# Patient Record
Sex: MALE | Race: WHITE | NOT HISPANIC OR LATINO | Employment: OTHER | URBAN - METROPOLITAN AREA
[De-identification: names, ages, dates, MRNs, and addresses within clinical notes are randomized per-mention and may not be internally consistent; named-entity substitution may affect disease eponyms.]

---

## 2017-12-04 PROBLEM — Z86.0100 HISTORY OF COLON POLYPS: Status: ACTIVE | Noted: 2017-12-04

## 2017-12-04 PROBLEM — Z86.010 HISTORY OF COLON POLYPS: Status: ACTIVE | Noted: 2017-12-04

## 2019-03-06 PROBLEM — C61 PROSTATE CANCER: Status: ACTIVE | Noted: 2019-03-06

## 2019-12-02 PROBLEM — R39.12 WEAK URINARY STREAM: Status: ACTIVE | Noted: 2019-12-02

## 2019-12-02 PROBLEM — R35.0 URINARY FREQUENCY: Status: ACTIVE | Noted: 2019-12-02

## 2019-12-02 PROBLEM — R31.29 MICROSCOPIC HEMATURIA: Status: ACTIVE | Noted: 2019-12-02

## 2019-12-02 PROBLEM — R39.15 URGENCY OF URINATION: Status: ACTIVE | Noted: 2019-12-02

## 2019-12-02 PROBLEM — R33.9 INCOMPLETE EMPTYING OF BLADDER: Status: ACTIVE | Noted: 2019-12-02

## 2020-01-24 PROBLEM — R19.5 HEME POSITIVE STOOL: Status: ACTIVE | Noted: 2020-01-24

## 2020-01-28 ENCOUNTER — TELEPHONE (OUTPATIENT)
Dept: ENDOSCOPY | Facility: HOSPITAL | Age: 65
End: 2020-01-28

## 2020-01-28 DIAGNOSIS — K62.7 RADIATION PROCTITIS: Primary | ICD-10-CM

## 2020-01-28 NOTE — TELEPHONE ENCOUNTER
Please review records in [facility] Being referred by Dr Hair for APC of radiation proctitis

## 2020-01-29 NOTE — TELEPHONE ENCOUNTER
MD Irena Baker MA   Caller: Unspecified (Yesterday,  4:53 PM)             Need sigmoidoscopy with RFA for radiation proctitis.   Bridger Roy MD      Please sign order

## 2020-01-31 ENCOUNTER — TELEPHONE (OUTPATIENT)
Dept: ENDOSCOPY | Facility: HOSPITAL | Age: 65
End: 2020-01-31

## 2020-01-31 NOTE — TELEPHONE ENCOUNTER
Spoke with patient. EGD scheduled for 2/14 at 11:30a. Reviewed prep instructions. Mr Junior verbalized understanding.

## 2020-02-03 ENCOUNTER — TELEPHONE (OUTPATIENT)
Dept: ENDOSCOPY | Facility: HOSPITAL | Age: 65
End: 2020-02-03

## 2020-02-03 NOTE — TELEPHONE ENCOUNTER
Received request to schedule patient for Flex sig on 2/14/20 at 11:30am. Spoke with Patient. Reviewed medical history and medications. Instructed on procedure and prep. Patient verbalized understanding of instructions. Mailed copy of instructions to address in chart.

## 2020-02-14 ENCOUNTER — ANESTHESIA EVENT (OUTPATIENT)
Dept: ENDOSCOPY | Facility: HOSPITAL | Age: 65
End: 2020-02-14
Payer: COMMERCIAL

## 2020-02-14 ENCOUNTER — ANESTHESIA (OUTPATIENT)
Dept: ENDOSCOPY | Facility: HOSPITAL | Age: 65
End: 2020-02-14
Payer: COMMERCIAL

## 2020-02-14 ENCOUNTER — HOSPITAL ENCOUNTER (OUTPATIENT)
Facility: HOSPITAL | Age: 65
Discharge: HOME | End: 2020-02-14
Attending: INTERNAL MEDICINE | Admitting: INTERNAL MEDICINE
Payer: COMMERCIAL

## 2020-02-14 VITALS
OXYGEN SATURATION: 100 % | DIASTOLIC BLOOD PRESSURE: 82 MMHG | WEIGHT: 180 LBS | SYSTOLIC BLOOD PRESSURE: 141 MMHG | RESPIRATION RATE: 15 BRPM | HEART RATE: 76 BPM | BODY MASS INDEX: 28.93 KG/M2 | TEMPERATURE: 98 F | HEIGHT: 66 IN

## 2020-02-14 DIAGNOSIS — K62.7 RADIATION PROCTITIS: ICD-10-CM

## 2020-02-14 PROCEDURE — 45346 PR SIGMOIDOSCOPY W/ABLATION: ICD-10-PCS | Mod: ,,, | Performed by: INTERNAL MEDICINE

## 2020-02-14 PROCEDURE — D9220A PRA ANESTHESIA: ICD-10-PCS | Mod: ANES,,, | Performed by: ANESTHESIOLOGY

## 2020-02-14 PROCEDURE — 45334 SIGMOIDOSCOPY FOR BLEEDING: CPT | Performed by: INTERNAL MEDICINE

## 2020-02-14 PROCEDURE — 45346 SIGMOIDOSCOPY W/ABLATION: CPT | Performed by: INTERNAL MEDICINE

## 2020-02-14 PROCEDURE — 37000008 HC ANESTHESIA 1ST 15 MINUTES: Performed by: INTERNAL MEDICINE

## 2020-02-14 PROCEDURE — D9220A PRA ANESTHESIA: Mod: CRNA,,, | Performed by: NURSE ANESTHETIST, CERTIFIED REGISTERED

## 2020-02-14 PROCEDURE — 45346 SIGMOIDOSCOPY W/ABLATION: CPT | Mod: ,,, | Performed by: INTERNAL MEDICINE

## 2020-02-14 PROCEDURE — 63600175 PHARM REV CODE 636 W HCPCS: Performed by: INTERNAL MEDICINE

## 2020-02-14 PROCEDURE — 63600175 PHARM REV CODE 636 W HCPCS: Performed by: NURSE ANESTHETIST, CERTIFIED REGISTERED

## 2020-02-14 PROCEDURE — D9220A PRA ANESTHESIA: ICD-10-PCS | Mod: CRNA,,, | Performed by: NURSE ANESTHETIST, CERTIFIED REGISTERED

## 2020-02-14 PROCEDURE — 27202087 HC PROBE, APC: Performed by: INTERNAL MEDICINE

## 2020-02-14 PROCEDURE — 37000009 HC ANESTHESIA EA ADD 15 MINS: Performed by: INTERNAL MEDICINE

## 2020-02-14 PROCEDURE — D9220A PRA ANESTHESIA: Mod: ANES,,, | Performed by: ANESTHESIOLOGY

## 2020-02-14 RX ORDER — PROPOFOL 10 MG/ML
VIAL (ML) INTRAVENOUS
Status: DISCONTINUED | OUTPATIENT
Start: 2020-02-14 | End: 2020-02-14

## 2020-02-14 RX ORDER — SODIUM CHLORIDE 9 MG/ML
INJECTION, SOLUTION INTRAVENOUS CONTINUOUS
Status: DISCONTINUED | OUTPATIENT
Start: 2020-02-14 | End: 2020-02-14 | Stop reason: HOSPADM

## 2020-02-14 RX ORDER — LIDOCAINE HYDROCHLORIDE 20 MG/ML
INJECTION INTRAVENOUS
Status: DISCONTINUED | OUTPATIENT
Start: 2020-02-14 | End: 2020-02-14

## 2020-02-14 RX ORDER — PROPOFOL 10 MG/ML
VIAL (ML) INTRAVENOUS CONTINUOUS PRN
Status: DISCONTINUED | OUTPATIENT
Start: 2020-02-14 | End: 2020-02-14

## 2020-02-14 RX ADMIN — SODIUM CHLORIDE: 0.9 INJECTION, SOLUTION INTRAVENOUS at 11:02

## 2020-02-14 RX ADMIN — PROPOFOL 30 MG: 10 INJECTION, EMULSION INTRAVENOUS at 11:02

## 2020-02-14 RX ADMIN — LIDOCAINE HYDROCHLORIDE 50 MG: 20 INJECTION, SOLUTION INTRAVENOUS at 11:02

## 2020-02-14 RX ADMIN — PROPOFOL 125 MCG/KG/MIN: 10 INJECTION, EMULSION INTRAVENOUS at 11:02

## 2020-02-14 NOTE — ANESTHESIA PREPROCEDURE EVALUATION
02/14/2020  Catrachito Kraft is a 64 y.o., male.    Pre-op Assessment    I have reviewed the Patient Summary Reports.      I have reviewed the Medications.     Review of Systems  Social:  Former Smoker    Hematology/Oncology:         -- Cancer in past history: Oncology Comments: Prostate cancer     Renal/:   Hx of Prostate cancer   Hepatic/GI:   Hiatal Hernia, GERD Radiation proctitis        Physical Exam  General:  Well nourished       Chest/Lungs:  Chest/Lungs Findings: Normal Respiratory Rate     Heart/Vascular:  Heart Findings: Rhythm: Regular Rhythm             Anesthesia Plan  Type of Anesthesia, risks & benefits discussed:  Anesthesia Type:  general  Patient's Preference:   Intra-op Monitoring Plan: standard ASA monitors  Intra-op Monitoring Plan Comments:   Post Op Pain Control Plan: multimodal analgesia  Post Op Pain Control Plan Comments:   Induction:   IV  Beta Blocker:  Patient is on a Beta-Blocker and has received one dose within the past 24 hours (No further documentation required).       Informed Consent: Patient understands risks and agrees with Anesthesia plan.  Questions answered. Anesthesia consent signed with patient.  ASA Score: 2     Day of Surgery Review of History & Physical:    H&P update referred to the surgeon.         Ready For Surgery From Anesthesia Perspective.

## 2020-02-14 NOTE — TRANSFER OF CARE
"Anesthesia Transfer of Care Note    Patient: Catrachito Kraft    Procedure(s) Performed: Procedure(s) (LRB):  SIGMOIDOSCOPY, FLEXIBLE/apc (N/A)    Patient location: PACU    Anesthesia Type: general    Transport from OR: Transported from OR on 6-10 L/min O2 by face mask with adequate spontaneous ventilation    Post pain: adequate analgesia    Post assessment: no apparent anesthetic complications and tolerated procedure well    Post vital signs: stable    Level of consciousness: awake, alert and oriented    Nausea/Vomiting: no nausea/vomiting    Complications: none    Transfer of care protocol was followed      Last vitals:   Visit Vitals  /74   Pulse 77   Temp 36.4 °C (97.6 °F) (Axillary)   Resp 20   Ht 5' 6" (1.676 m)   Wt 81.6 kg (180 lb)   SpO2 95%   BMI 29.05 kg/m²     "

## 2020-02-14 NOTE — PLAN OF CARE
Patient states they are ready to be discharged. Instructions given to patient and wife. Both verbalize understanding. Patient tolerating po liquids with no difficulty. Patient states pain is at a tolerable level for them. Anesthesia consent and surgical consent in chart upon patient's discharge from [facility].

## 2020-02-14 NOTE — DISCHARGE INSTRUCTIONS
Sigmoidoscopy    Sigmoidoscopy is a procedure used to view the lower colon and rectum. This test can help find the source of belly pain, rectal bleeding, and changes in bowel habits. Sigmoidoscopy is also used as part of the screening for colorectal cancer. It is done using a sigmoidoscope, a flexible tube with a viewing lens and light.  If youre 50 or over, the American Cancer Society recommends having this test in addition to stool tests, every 5 years to screen for colorectal cancer. Your healthcare provider may also recommend other colon cancer screening methods such as colonoscopy.   Getting ready  Here is how to prepare:  · Be sure to tell your healthcare provider about any medicines you take. Also tell your healthcare provider about any health conditions you may have.  · Ask your healthcare provider about the risks of the test. These include bleeding and bowel puncture.  · Your rectum and colon must be empty for the test, so be sure to follow the diet and bowel prep instructions. Otherwise the test may need to be rescheduled.  During the test  Here is what to expect:  · The test is done in the healthcare providers office or in a hospital endoscopy unit. You may wear a gown or a drape over your lower body.  · The procedure usually takes 10 to 20 minutes.  · The healthcare provider does a digital rectal exam to check for anal and rectal problems. The rectum is lubricated and the scope inserted.  · You may have a feeling similar to needing to have a bowel movement. You may also feel pressure when air is pumped into the colon This is done so that the healthcare provider can get a better view. Its expected that you will pass gas during the procedure.  After the test  Here is what to expect:  · Usually youll discuss the results with your healthcare provider right away, unless youre having other tests.  · If biopsies (tissue samples) were taken, you'll want to ask when to contact the for results.   · Try to  pass all the gas right after the test. Otherwise you may have bloating and cramping.  · After the test you can go back to your normal eating and other activities.  When to call your healthcare provider  Call if you have any of the following after the procedure:  · Pain in your belly  · Fever  · Dizziness or weakness  · Excessive rectal bleeding. Slight bleeding or spotting is normal, especially if a biopsy was taken.   Date Last Reviewed: 7/1/2016 © 2000-2017 [employer]. [address] [place] [address]. All rights reserved. This information is not intended as a substitute for professional medical care. Always follow your healthcare professional's instructions.    PATIENT INSTRUCTIONS  POST-ANESTHESIA    IMMEDIATELY FOLLOWING SURGERY:  Do not drive or operate machinery for the first twenty four hours after surgery.  Do not make any important decisions for twenty four hours after surgery or while taking narcotic pain medications or sedatives.  If you develop intractable nausea and vomiting or a severe headache please notify your doctor immediately.    FOLLOW-UP:  Please make an appointment with your surgeon as instructed. You do not need to follow up with anesthesia unless specifically instructed to do so.    WOUND CARE INSTRUCTIONS (if applicable):  Keep a dry clean dressing on the anesthesia/puncture wound site if there is drainage.  Once the wound has quit draining you may leave it open to air.  Generally you should leave the bandage intact for twenty four hours unless there is drainage.  If the epidural site drains for more than 36-48 hours please call the anesthesia department.    QUESTIONS?:  Please feel free to call your physician or the hospital  if you have any questions, and they will be happy to assist you.       [facility] Anesthesia Department  [address] [facility]  [phone]

## 2020-02-14 NOTE — H&P
Short Stay Endoscopy History and Physical    PCP - Keron Leon MD  Referring Physician - Lorne Hair MD  [address]  [place] [id number]    Procedure - flex sig  ASA - per anesthesia  Mallampati - per anesthesia  History of Anesthesia problems - no  Family history Anesthesia problems -  no   Plan of anesthesia - General    HPI:  This is a 64 y.o. male here for evaluation of: rectal bleeding    Reflux - no  Dysphagia - no  Abdominal pain - no  Diarrhea - no    ROS:  Constitutional: No fevers, chills, No weight loss  CV: No chest pain  Pulm: No cough, No shortness of breath  Ophtho: No vision changes  GI: see HPI  Derm: No rash    Medical History:  has a past medical history of Abdominal aortic aneurysm without rupture, Colon polyps, Elevated cholesterol, GERD (gastroesophageal reflux disease), Hiatal hernia, Hypercholesteremia, Prostate cancer, Reflux esophagitis, Urinary incontinence, and Vertigo.    Surgical History:  has a past surgical history that includes Appendectomy; Colonoscopy (N/A, 12/4/2017); Prostate biopsy; Radical retropubic prostatectomy (N/A, 4/8/2019); and Colonoscopy (N/A, 1/24/2020).    Family History: family history includes Bladder Cancer in his father; COPD in his mother; Cancer in his father; Colon polyps in his father; Heart disease in his father and mother; Hyperlipidemia in his mother; Kidney disease in his father and mother; Prostate cancer in his father..    Social History:  reports that he quit smoking about 13 years ago. His smoking use included cigarettes. He has never used smokeless tobacco. He reports that he drinks alcohol. He reports that he does not use drugs.    Review of patient's allergies indicates:  No Known Allergies    Medications:   Medications Prior to Admission   Medication Sig Dispense Refill Last Dose    aspirin (ECOTRIN) 81 MG EC tablet Take 81 mg by mouth once daily.   2/13/2020 at Unknown time    atorvastatin (LIPITOR) 40 MG tablet Take 40  mg by mouth every evening.    2/13/2020 at Unknown time    cyclobenzaprine (FLEXERIL) 10 MG tablet Take 5 mg by mouth 3 (three) times daily as needed for Muscle spasms.   Past Month at Unknown time    esomeprazole (NEXIUM) 40 MG capsule Take 40 mg by mouth before breakfast.   2/13/2020 at Unknown time    ferrous sulfate 325 (65 FE) MG EC tablet Take 325 mg by mouth once daily.   Past Week at Unknown time    meclizine (ANTIVERT) 25 mg tablet Take 1 tablet (25 mg total) by mouth 3 (three) times daily as needed. (Patient taking differently: Take 25 mg by mouth as needed. ) 20 tablet 0 Past Month at Unknown time    ranitidine (ZANTAC) 150 MG tablet Take 150 mg by mouth as needed for Heartburn.   2/14/2020 at Unknown time       Physical Exam:    Vital Signs:   Vitals:    02/14/20 1016   BP: 133/85   Pulse: 73   Resp: 16   Temp: 98.1 °F (36.7 °C)       General Appearance: Well appearing in no acute distress    Labs:  Lab Results   Component Value Date    WBC 4.20 08/29/2019    HGB 10.3 (L) 08/29/2019    HCT 30.8 (L) 08/29/2019     08/29/2019    ALT 59 (H) 08/29/2019    AST 37 08/29/2019     08/29/2019    K 3.9 08/29/2019     08/29/2019    CREATININE 0.80 08/29/2019    BUN 14 08/29/2019    CO2 27 08/29/2019    INR 1.0 03/26/2019       I have explained the risks and benefits of this endoscopic procedure to the patient including but not limited to bleeding, inflammation, infection, perforation, and death.      Amol Telles MD

## 2020-02-14 NOTE — [employer] PATIENT INSTRUCTIONS
Discharge Summary/Instructions after an Endoscopic Procedure  Patient Name: Catrachito Kraft  Patient MRN: [id number]  Patient YOB: 1955 Friday, February 14, 2020  Amol Telles MD  RESTRICTIONS:  During your procedure today, you received medications for sedation.  These   medications may affect your judgment, balance and coordination.  Therefore,   for 24 hours, you have the following restrictions:   - DO NOT drive a car, operate machinery, make legal/financial decisions,   sign important papers or drink alcohol.    ACTIVITY:  Today: no heavy lifting, straining or running due to procedural   sedation/anesthesia.  The following day: return to full activity including work.  DIET:  Eat and drink normally unless instructed otherwise.     TREATMENT FOR COMMON SIDE EFFECTS:  - Mild abdominal pain, nausea, belching, bloating or excessive gas:  rest,   eat lightly and use a heating pad.  - Sore Throat: treat with throat lozenges and/or gargle with warm salt   water.  - Because air was used during the procedure, expelling large amounts of air   from your rectum or belching is normal.  - If a bowel prep was taken, you may not have a bowel movement for 1-3 days.    This is normal.  SYMPTOMS TO WATCH FOR AND REPORT TO YOUR PHYSICIAN:  1. Abdominal pain or bloating, other than gas cramps.  2. Chest pain.  3. Back pain.  4. Signs of infection such as: chills or fever occurring within 24 hours   after the procedure.  5. Rectal bleeding, which would show as bright red, maroon, or black stools.   (A tablespoon of blood from the rectum is not serious, especially if   hemorrhoids are present.)  6. Vomiting.  7. Weakness or dizziness.  GO DIRECTLY TO THE NEAREST EMERGENCY ROOM IF YOU HAVE ANY OF THE FOLLOWING:      Difficulty breathing              Chills and/or fever over 101 F   Persistent vomiting and/or vomiting blood   Severe abdominal pain   Severe chest pain   Black, tarry stools   Bleeding- more than one  tablespoon   Any other symptom or condition that you feel may need urgent attention  Your doctor recommends these additional instructions:  If any biopsies were taken, your doctors clinic will contact you in 1 to 2   weeks with any results.  - Continue present medications.   - Resume previous diet.   - Repeat flexible sigmoidoscopy PRN for retreatment.   - Return to referring physician as previously scheduled.   - Discharge patient to home (ambulatory).  For questions, problems or results please call your physician - Amol Telles MD at Work:  [phone].  [facility], EMERGENCY ROOM PHONE NUMBER: [phone]  IF A COMPLICATION OR EMERGENCY SITUATION ARISES AND YOU ARE UNABLE TO REACH   YOUR PHYSICIAN - GO DIRECTLY TO THE EMERGENCY ROOM.  Amol Telles MD  2/14/2020 11:19:08 AM  This report has been verified and signed electronically.  [employer]

## 2020-02-17 NOTE — ANESTHESIA POSTPROCEDURE EVALUATION
Anesthesia Post Evaluation    Patient: Catrachito Kraft    Procedure(s) Performed: Procedure(s) (LRB):  SIGMOIDOSCOPY, FLEXIBLE/apc (N/A)    Final Anesthesia Type: general    Patient location during evaluation: PACU  Patient participation: Yes- Able to Participate  Level of consciousness: awake and alert  Post-procedure vital signs: reviewed and stable  Pain management: adequate  Airway patency: patent    PONV status at discharge: No PONV  Anesthetic complications: no      Cardiovascular status: blood pressure returned to baseline  Respiratory status: unassisted  Hydration status: euvolemic  Follow-up not needed.          Vitals Value Taken Time   /92 2/14/2020 12:31 PM   Temp 36.8 °C (98.3 °F) 2/14/2020 12:30 PM   Pulse 80 2/14/2020 12:34 PM   Resp 22 2/14/2020 12:34 PM   SpO2 96 % 2/14/2020 12:34 PM   Vitals shown include unvalidated device data.      Event Time     Out of Recovery 12:05:00          Pain/Roberto Score: No data recorded

## 2020-03-02 PROBLEM — R31.0 GROSS HEMATURIA: Status: ACTIVE | Noted: 2020-03-02

## 2020-03-02 PROBLEM — R30.0 DYSURIA: Status: ACTIVE | Noted: 2020-03-02

## 2020-06-05 ENCOUNTER — TELEPHONE (OUTPATIENT)
Dept: GASTROENTEROLOGY | Facility: CLINIC | Age: 65
End: 2020-06-05

## 2020-06-05 DIAGNOSIS — K62.5 RECTAL BLEEDING: Primary | ICD-10-CM

## 2020-06-05 NOTE — TELEPHONE ENCOUNTER
----- Message from Perri Howe sent at 6/5/2020  7:48 AM CDT -----  Contact: patient  Type:  Needs Medical Advice    Who Called: pt  Advice Regarding: schedule procedure for rectal bleeding  Would the patient rather a call back or a response via [facility]? call  Best Call Back Number: [phone]  Additional Information: n/a

## 2020-06-15 ENCOUNTER — TELEPHONE (OUTPATIENT)
Dept: ENDOSCOPY | Facility: HOSPITAL | Age: 65
End: 2020-06-15

## 2020-06-15 NOTE — TELEPHONE ENCOUNTER
Spoke with patient about instructions for Flexible Sigmoidoscopy scheduled 6/26/20 at 1200.  Instructions mailed.  Covid-19 test 6/24/20 at 0900 at [facility].

## 2020-06-24 ENCOUNTER — LAB VISIT (OUTPATIENT)
Dept: URGENT CARE | Facility: CLINIC | Age: 65
End: 2020-06-24
Payer: COMMERCIAL

## 2020-06-24 LAB — SARS-COV-2 RNA RESP QL NAA+PROBE: NOT DETECTED

## 2020-06-24 PROCEDURE — U0003 INFECTIOUS AGENT DETECTION BY NUCLEIC ACID (DNA OR RNA); SEVERE ACUTE RESPIRATORY SYNDROME CORONAVIRUS 2 (SARS-COV-2) (CORONAVIRUS DISEASE [COVID-19]), AMPLIFIED PROBE TECHNIQUE, MAKING USE OF HIGH THROUGHPUT TECHNOLOGIES AS DESCRIBED BY CMS-2020-01-R: HCPCS

## 2020-06-25 ENCOUNTER — TELEPHONE (OUTPATIENT)
Dept: URGENT CARE | Facility: CLINIC | Age: 65
End: 2020-06-25

## 2020-06-25 NOTE — TELEPHONE ENCOUNTER
Called patient back for follow up visit yesterday for Covid testing.  Gave patient NEGATIVE Covid result.  Patient states he is currently asymptomatic.  Recommended continuing current activity, follow up with us as needed.

## 2020-06-26 ENCOUNTER — ANESTHESIA (OUTPATIENT)
Dept: ENDOSCOPY | Facility: HOSPITAL | Age: 65
End: 2020-06-26
Payer: COMMERCIAL

## 2020-06-26 ENCOUNTER — HOSPITAL ENCOUNTER (OUTPATIENT)
Facility: HOSPITAL | Age: 65
Discharge: HOME | End: 2020-06-26
Attending: INTERNAL MEDICINE | Admitting: INTERNAL MEDICINE
Payer: COMMERCIAL

## 2020-06-26 ENCOUNTER — ANESTHESIA EVENT (OUTPATIENT)
Dept: ENDOSCOPY | Facility: HOSPITAL | Age: 65
End: 2020-06-26
Payer: COMMERCIAL

## 2020-06-26 VITALS
TEMPERATURE: 98 F | HEART RATE: 67 BPM | BODY MASS INDEX: 29.73 KG/M2 | SYSTOLIC BLOOD PRESSURE: 173 MMHG | RESPIRATION RATE: 22 BRPM | HEIGHT: 66 IN | WEIGHT: 185 LBS | DIASTOLIC BLOOD PRESSURE: 96 MMHG | OXYGEN SATURATION: 100 %

## 2020-06-26 DIAGNOSIS — K92.1 HEMATOCHEZIA: ICD-10-CM

## 2020-06-26 PROCEDURE — 45334 SIGMOIDOSCOPY FOR BLEEDING: CPT | Performed by: INTERNAL MEDICINE

## 2020-06-26 PROCEDURE — 63600175 PHARM REV CODE 636 W HCPCS: Performed by: INTERNAL MEDICINE

## 2020-06-26 PROCEDURE — 45334 SIGMOIDOSCOPY FOR BLEEDING: CPT | Mod: ,,, | Performed by: INTERNAL MEDICINE

## 2020-06-26 PROCEDURE — 25000003 PHARM REV CODE 250: Performed by: INTERNAL MEDICINE

## 2020-06-26 PROCEDURE — 63600175 PHARM REV CODE 636 W HCPCS: Performed by: NURSE ANESTHETIST, CERTIFIED REGISTERED

## 2020-06-26 PROCEDURE — 63600175 PHARM REV CODE 636 W HCPCS: Performed by: ANESTHESIOLOGY

## 2020-06-26 PROCEDURE — D9220A PRA ANESTHESIA: ICD-10-PCS | Mod: ANES,,, | Performed by: ANESTHESIOLOGY

## 2020-06-26 PROCEDURE — D9220A PRA ANESTHESIA: ICD-10-PCS | Mod: CRNA,,, | Performed by: NURSE ANESTHETIST, CERTIFIED REGISTERED

## 2020-06-26 PROCEDURE — C1886 CATHETER, ABLATION: HCPCS | Performed by: INTERNAL MEDICINE

## 2020-06-26 PROCEDURE — 45334 PR SIGMOIDOSCOPY,FLEX,W/CONTROL,BLEEDING: ICD-10-PCS | Mod: ,,, | Performed by: INTERNAL MEDICINE

## 2020-06-26 PROCEDURE — 37000008 HC ANESTHESIA 1ST 15 MINUTES: Performed by: INTERNAL MEDICINE

## 2020-06-26 PROCEDURE — D9220A PRA ANESTHESIA: Mod: CRNA,,, | Performed by: NURSE ANESTHETIST, CERTIFIED REGISTERED

## 2020-06-26 PROCEDURE — 37000009 HC ANESTHESIA EA ADD 15 MINS: Performed by: INTERNAL MEDICINE

## 2020-06-26 PROCEDURE — D9220A PRA ANESTHESIA: Mod: ANES,,, | Performed by: ANESTHESIOLOGY

## 2020-06-26 RX ORDER — SODIUM CHLORIDE 0.9 % (FLUSH) 0.9 %
10 SYRINGE (ML) INJECTION
Status: DISCONTINUED | OUTPATIENT
Start: 2020-06-26 | End: 2020-06-26 | Stop reason: HOSPADM

## 2020-06-26 RX ORDER — SODIUM CHLORIDE 9 MG/ML
INJECTION, SOLUTION INTRAVENOUS CONTINUOUS
Status: DISCONTINUED | OUTPATIENT
Start: 2020-06-26 | End: 2020-06-26 | Stop reason: HOSPADM

## 2020-06-26 RX ORDER — LIDOCAINE HCL/PF 100 MG/5ML
SYRINGE (ML) INTRAVENOUS
Status: DISCONTINUED | OUTPATIENT
Start: 2020-06-26 | End: 2020-06-26

## 2020-06-26 RX ORDER — FENTANYL CITRATE 50 UG/ML
50 INJECTION, SOLUTION INTRAMUSCULAR; INTRAVENOUS
Status: DISCONTINUED | OUTPATIENT
Start: 2020-06-26 | End: 2020-06-26 | Stop reason: HOSPADM

## 2020-06-26 RX ORDER — PROPOFOL 10 MG/ML
VIAL (ML) INTRAVENOUS CONTINUOUS PRN
Status: DISCONTINUED | OUTPATIENT
Start: 2020-06-26 | End: 2020-06-26

## 2020-06-26 RX ORDER — FENTANYL CITRATE 50 UG/ML
50 INJECTION, SOLUTION INTRAMUSCULAR; INTRAVENOUS ONCE
Status: COMPLETED | OUTPATIENT
Start: 2020-06-26 | End: 2020-06-26

## 2020-06-26 RX ORDER — PROPOFOL 10 MG/ML
VIAL (ML) INTRAVENOUS
Status: DISCONTINUED | OUTPATIENT
Start: 2020-06-26 | End: 2020-06-26

## 2020-06-26 RX ADMIN — PROPOFOL 30 MG: 10 INJECTION, EMULSION INTRAVENOUS at 12:06

## 2020-06-26 RX ADMIN — FENTANYL CITRATE 50 MCG: 50 INJECTION, SOLUTION INTRAMUSCULAR; INTRAVENOUS at 01:06

## 2020-06-26 RX ADMIN — PROPOFOL 150 MCG/KG/MIN: 10 INJECTION, EMULSION INTRAVENOUS at 12:06

## 2020-06-26 RX ADMIN — SODIUM CHLORIDE: 0.9 INJECTION, SOLUTION INTRAVENOUS at 11:06

## 2020-06-26 RX ADMIN — Medication 40 MG: at 12:06

## 2020-06-26 RX ADMIN — PROPOFOL 50 MG: 10 INJECTION, EMULSION INTRAVENOUS at 12:06

## 2020-06-26 NOTE — [employer] PATIENT INSTRUCTIONS
Discharge Summary/Instructions after an Endoscopic Procedure  Patient Name: Catrachito Kraft  Patient MRN: [id number]  Patient YOB: 1955 Friday, June 26, 2020  Amol Telles MD  RESTRICTIONS:  During your procedure today, you received medications for sedation.  These   medications may affect your judgment, balance and coordination.  Therefore,   for 24 hours, you have the following restrictions:   - DO NOT drive a car, operate machinery, make legal/financial decisions,   sign important papers or drink alcohol.    ACTIVITY:  Today: no heavy lifting, straining or running due to procedural   sedation/anesthesia.  The following day: return to full activity including work.  DIET:  Eat and drink normally unless instructed otherwise.     TREATMENT FOR COMMON SIDE EFFECTS:  - Mild abdominal pain, nausea, belching, bloating or excessive gas:  rest,   eat lightly and use a heating pad.  - Sore Throat: treat with throat lozenges and/or gargle with warm salt   water.  - Because air was used during the procedure, expelling large amounts of air   from your rectum or belching is normal.  - If a bowel prep was taken, you may not have a bowel movement for 1-3 days.    This is normal.  SYMPTOMS TO WATCH FOR AND REPORT TO YOUR PHYSICIAN:  1. Abdominal pain or bloating, other than gas cramps.  2. Chest pain.  3. Back pain.  4. Signs of infection such as: chills or fever occurring within 24 hours   after the procedure.  5. Rectal bleeding, which would show as bright red, maroon, or black stools.   (A tablespoon of blood from the rectum is not serious, especially if   hemorrhoids are present.)  6. Vomiting.  7. Weakness or dizziness.  GO DIRECTLY TO THE NEAREST EMERGENCY ROOM IF YOU HAVE ANY OF THE FOLLOWING:      Difficulty breathing              Chills and/or fever over 101 F   Persistent vomiting and/or vomiting blood   Severe abdominal pain   Severe chest pain   Black, tarry stools   Bleeding- more than one tablespoon   Any  other symptom or condition that you feel may need urgent attention  Your doctor recommends these additional instructions:  If any biopsies were taken, your doctors clinic will contact you in 1 to 2   weeks with any results.  - Discharge patient to home.   - Resume previous diet.   - Repeat flexible sigmoidoscopy PRN for retreatment.   - Return to primary care physician at appointment to be scheduled.  For questions, problems or results please call your physician - Amol Telles MD at Work:  [phone].  [facility], EMERGENCY ROOM PHONE NUMBER: [phone]  IF A COMPLICATION OR EMERGENCY SITUATION ARISES AND YOU ARE UNABLE TO REACH   YOUR PHYSICIAN - GO DIRECTLY TO THE EMERGENCY ROOM.  Amol Telles MD  6/26/2020 1:18:34 PM  This report has been verified and signed electronically.  [employer]

## 2020-06-26 NOTE — PLAN OF CARE
Patient states they are ready to be discharged. Instructions and report given to patient; verbalizes understanding. Patient tolerating po liquids with no difficulty. Patient states pain is at a tolerable level for them. Anesthesia consent and surgical consent in chart upon patient's discharge from [facility].

## 2020-06-26 NOTE — TRANSFER OF CARE
"Anesthesia Transfer of Care Note    Patient: Catrachito Kraft    Procedure(s) Performed: Procedure(s) (LRB):  SIGMOIDOSCOPY, FLEXIBLE/apc (N/A)    Patient location: PACU    Anesthesia Type: general    Transport from OR: Transported from OR on room air with adequate spontaneous ventilation    Post pain: adequate analgesia    Post assessment: no apparent anesthetic complications and tolerated procedure well    Post vital signs: stable    Level of consciousness: awake, alert and oriented    Nausea/Vomiting: no nausea/vomiting    Complications: none    Transfer of care protocol was followed      Last vitals:   Visit Vitals  BP (!) 137/91 (Patient Position: Lying)   Pulse 82   Temp 36.8 °C (98.2 °F) (Temporal)   Resp 16   Ht 5' 6" (1.676 m)   Wt 83.9 kg (185 lb)   SpO2 100%   BMI 29.86 kg/m²     "

## 2020-06-26 NOTE — PROGRESS NOTES
Patient complaining of severe rectal pain. Small amount of bright red rectal bleeding noted on pad.  called to come to bedside to assess.

## 2020-06-26 NOTE — ANESTHESIA PREPROCEDURE EVALUATION
06/26/2020  Catrachito Kraft is a 64 y.o., male.  Patient Active Problem List   Diagnosis    History of colon polyps    Prostate cancer    Weak urinary stream    Microscopic hematuria    Urinary frequency    Urgency of urination    Incomplete emptying of bladder    Heme positive stool    Radiation proctitis    Gross hematuria    Dysuria     No current facility-administered medications on file prior to encounter.      Current Outpatient Medications on File Prior to Encounter   Medication Sig Dispense Refill    aspirin (ECOTRIN) 81 MG EC tablet Take 81 mg by mouth once daily.      atorvastatin (LIPITOR) 40 MG tablet Take 40 mg by mouth every evening.       esomeprazole (NEXIUM) 40 MG capsule Take 40 mg by mouth before breakfast.      ciprofloxacin HCl (CIPRO) 500 MG tablet Take 500 mg by mouth every 12 (twelve) hours.      ferrous sulfate 325 (65 FE) MG EC tablet Take 325 mg by mouth once daily.      leuprolide, 6 month, (ELIGARD) 45 mg injection Inject 45 mg into the skin every 6 (six) months.      meclizine (ANTIVERT) 25 mg tablet Take 1 tablet (25 mg total) by mouth 3 (three) times daily as needed. (Patient taking differently: Take 25 mg by mouth as needed. ) 20 tablet 0    ranitidine (ZANTAC) 150 MG tablet Take 150 mg by mouth as needed for Heartburn.         Anesthesia Evaluation    I have reviewed the Patient Summary Reports.    I have reviewed the Nursing Notes.    I have reviewed the Medications.     Review of Systems  Anesthesia Hx:  No problems with previous Anesthesia  Neg history of prior surgery. Denies Family Hx of Anesthesia complications.  Personal Hx of Anesthesia complications Slow To Awaken/Delayed Emergence and mild, somewhat sensitive to sedation / narcotics   Social:  Former Smoker, Social Alcohol Use    Hematology/Oncology:        Current/Recent Cancer. Other (see Oncology  comments) surgery, chemotherapy and radiation Oncology Comments: Prostate CA 1/2019    Cardiovascular:   Exercise tolerance: good Hypertension (141/84), well controlled CAD asymptomatic  hyperlipidemia ECG has been reviewed.  12/2019 EKG NSR Mod ST depression Abnormal EKG  1/2020 Echo EF 55-60%  PASP 22.7 Valvular Heart Disease: Mitral Regurgitation (MR), mild, Pulmonic Regurgitation (MT), mild, Tricuspid Regurgitation (TR), mild   Abdominal Aortic Aneurysm, infrarenal , Dimension(cm) = 3.4 Hypertension, Essential Hypertension , Untreated , Recent typical clinic B/P of 134/81  Disorder of Cardiac Rhythm, Atrial Fibrillation, Paroxysmal Atrial Fibrillation, controlled on medical Rx    Renal/:   BPH  Renal Symptoms/Infections/Stones: frequency, hematuria, urgency, incontinence.  Devices/Procedures/Surgery, indwelling urinary catheter.   Hepatic/GI:   Hiatal Hernia, GERD        Physical Exam  General:  Well nourished    Airway/Jaw/Neck:  Airway Findings: Mouth Opening: Normal Tongue: Normal  General Airway Assessment: Adult  Mallampati: II  TM Distance: Normal, at least 6 cm  Jaw/Neck Findings:  Neck ROM: Normal ROM      Dental:  Dental Findings: Edentulous   Chest/Lungs:  Chest/Lungs Findings: Clear to auscultation, Normal Respiratory Rate     Heart/Vascular:  Heart Findings: Rate: Normal  Rhythm: Regular Rhythm        Mental Status:  Mental Status Findings:  Cooperative, Alert and Oriented         Anesthesia Plan  Type of Anesthesia, risks & benefits discussed:  Anesthesia Type:  general  Patient's Preference:   Intra-op Monitoring Plan: standard ASA monitors  Intra-op Monitoring Plan Comments:   Post Op Pain Control Plan: per primary service following discharge from PACU  Post Op Pain Control Plan Comments:   Induction:    Beta Blocker:  Patient is not currently on a Beta-Blocker (No further documentation required).       Informed Consent: Patient understands risks and agrees with Anesthesia plan.  Questions  answered. Anesthesia consent signed with patient.  ASA Score: 3     Day of Surgery Review of History & Physical:    H&P update referred to the surgeon.         Ready For Surgery From Anesthesia Perspective.

## 2020-06-26 NOTE — DISCHARGE INSTRUCTIONS
Sigmoidoscopy    Sigmoidoscopy is a procedure used to view the lower colon and rectum. This test can help find the source of belly pain, rectal bleeding, and changes in bowel habits. Sigmoidoscopy is also used as part of the screening for colorectal cancer. It is done using a sigmoidoscope, a flexible tube with a viewing lens and light.  If youre 50 or over, the American Cancer Society recommends having this test in addition to stool tests, every 5 years to screen for colorectal cancer. Your healthcare provider may also recommend other colon cancer screening methods such as colonoscopy.   Getting ready  Here is how to prepare:  · Be sure to tell your healthcare provider about any medicines you take. Also tell your healthcare provider about any health conditions you may have.  · Ask your healthcare provider about the risks of the test. These include bleeding and bowel puncture.  · Your rectum and colon must be empty for the test, so be sure to follow the diet and bowel prep instructions. Otherwise the test may need to be rescheduled.  During the test  Here is what to expect:  · The test is done in the healthcare providers office or in a hospital endoscopy unit. You may wear a gown or a drape over your lower body.  · The procedure usually takes 10 to 20 minutes.  · The healthcare provider does a digital rectal exam to check for anal and rectal problems. The rectum is lubricated and the scope inserted.  · You may have a feeling similar to needing to have a bowel movement. You may also feel pressure when air is pumped into the colon This is done so that the healthcare provider can get a better view. Its expected that you will pass gas during the procedure.  After the test  Here is what to expect:  · Usually youll discuss the results with your healthcare provider right away, unless youre having other tests.  · If biopsies (tissue samples) were taken, you'll want to ask when to contact the for results.   · Try to  pass all the gas right after the test. Otherwise you may have bloating and cramping.  · After the test you can go back to your normal eating and other activities.  When to call your healthcare provider  Call if you have any of the following after the procedure:  · Pain in your belly  · Fever  · Dizziness or weakness  · Excessive rectal bleeding. Slight bleeding or spotting is normal, especially if a biopsy was taken.   Date Last Reviewed: 7/1/2016 © 2000-2017 [employer]. [address] [place] [address]. All rights reserved. This information is not intended as a substitute for professional medical care. Always follow your healthcare professional's instructions.

## 2020-06-29 NOTE — H&P
Short Stay Endoscopy History and Physical    PCP - Keron Leon MD  Referring Physician - Amol Telles MD  [address] [facility]  [address]  [place] [address]    Procedure - flex sig  ASA - per anesthesia  Mallampati - per anesthesia  History of Anesthesia problems - no  Family history Anesthesia problems -  no   Plan of anesthesia - General    HPI:  This is a 64 y.o. male here for evaluation of: rectal bleeding    Reflux - no  Dysphagia - no  Abdominal pain - no  Diarrhea - no    ROS:  Constitutional: No fevers, chills, No weight loss  CV: No chest pain  Pulm: No cough, No shortness of breath  Ophtho: No vision changes  GI: see HPI  Derm: No rash    Medical History:  has a past medical history of Abdominal aortic aneurysm without rupture, Colon polyps, Elevated cholesterol, GERD (gastroesophageal reflux disease), Hematochezia, Hiatal hernia, Hypercholesteremia, Proctitis, radiation, Prostate cancer, Reflux esophagitis, Urinary incontinence, Vertigo, and Wears glasses.    Surgical History:  has a past surgical history that includes Appendectomy; Colonoscopy (N/A, 12/4/2017); Prostate biopsy; Radical retropubic prostatectomy (N/A, 4/8/2019); Colonoscopy (N/A, 1/24/2020); Flexible sigmoidoscopy (N/A, 2/14/2020); Cystoscopy w/ retrogrades (Bilateral, 3/11/2020); and Dilation of urethra (N/A, 3/11/2020).    Family History: family history includes Bladder Cancer in his father; COPD in his mother; Cancer in his father; Colon polyps in his father; Heart disease in his father and mother; Hyperlipidemia in his mother; Kidney disease in his father and mother; Prostate cancer in his father..    Social History:  reports that he quit smoking about 13 years ago. His smoking use included cigarettes. He has never used smokeless tobacco. He reports current alcohol use. He reports that he does not use drugs.    Review of patient's allergies indicates:  No Known Allergies    Medications:   No medications prior to admission.        Physical Exam:    Vital Signs:   Vitals:    06/26/20 1400   BP: (!) 173/96   Pulse: 67   Resp: (!) 22   Temp:        General Appearance: Well appearing in no acute distress    Labs:  Lab Results   Component Value Date    WBC 5.70 04/13/2020    HGB 12.7 (L) 04/13/2020    HCT 37.5 (L) 04/13/2020     04/13/2020    ALT 59 (H) 08/29/2019    AST 37 08/29/2019     03/10/2020    K 4.2 03/10/2020     03/10/2020    CREATININE 0.90 03/10/2020    BUN 26 (H) 03/10/2020    CO2 29 03/10/2020    INR 1.0 03/26/2019       I have explained the risks and benefits of this endoscopic procedure to the patient including but not limited to bleeding, inflammation, infection, perforation, and death.      Amol Telles MD

## 2020-07-06 NOTE — ANESTHESIA POSTPROCEDURE EVALUATION
Anesthesia Post Evaluation    Patient: Catrachito Kraft    Procedure(s) Performed: Procedure(s) (LRB):  SIGMOIDOSCOPY, FLEXIBLE/apc (N/A)    Final Anesthesia Type: general    Patient location during evaluation: PACU  Patient participation: Yes- Able to Participate  Level of consciousness: awake and alert  Post-procedure vital signs: reviewed and stable  Pain management: adequate  Airway patency: patent    PONV status at discharge: No PONV  Anesthetic complications: no      Cardiovascular status: stable  Respiratory status: spontaneous ventilation and room air  Hydration status: euvolemic  Follow-up not needed.          Vitals Value Taken Time   /96 06/26/20 1400   Temp 36.7 °C (98.1 °F) 06/26/20 1300   Pulse 67 06/26/20 1400   Resp 22 06/26/20 1400   SpO2 100 % 06/26/20 1400         No case tracking events are documented in the log.      Pain/Roberto Score: No data recorded

## 2020-07-30 ENCOUNTER — TELEPHONE (OUTPATIENT)
Dept: ENDOSCOPY | Facility: HOSPITAL | Age: 65
End: 2020-07-30

## 2020-07-30 ENCOUNTER — PATIENT MESSAGE (OUTPATIENT)
Dept: GASTROENTEROLOGY | Facility: CLINIC | Age: 65
End: 2020-07-30

## 2020-07-30 DIAGNOSIS — K62.5 RECTAL BLEEDING: Primary | ICD-10-CM

## 2020-07-30 NOTE — TELEPHONE ENCOUNTER
Patient having a lot of blood from his rectum. He says it has kerri going on a while. But today it has been worse than prior. Please advise what he should so

## 2020-07-30 NOTE — TELEPHONE ENCOUNTER
----- Message from Genaro Laurent sent at 7/30/2020  2:33 PM CDT -----  Regarding: Call back  Type:  Needs Medical Advice    Who Called:  patient  Symptoms (please be specific):  severe bleeding   How long has patient had these symptoms:   constant since last visit  Would the patient rather a call back or a response via [employer]?  Call back  Best Call Back Number:  [phone]  Additional Information:  please call today as soon as possible as he stated he will run out of blood soon

## 2020-08-01 ENCOUNTER — PATIENT MESSAGE (OUTPATIENT)
Dept: GASTROENTEROLOGY | Facility: CLINIC | Age: 65
End: 2020-08-01

## 2020-08-03 ENCOUNTER — PATIENT MESSAGE (OUTPATIENT)
Dept: GASTROENTEROLOGY | Facility: CLINIC | Age: 65
End: 2020-08-03

## 2020-08-04 NOTE — TELEPHONE ENCOUNTER
Spoke with patient. Flex scheduled for 8/12 at 11:30a. Endo scheduling nurse will contact patient with prep/

## 2020-08-05 ENCOUNTER — TELEPHONE (OUTPATIENT)
Dept: ENDOSCOPY | Facility: HOSPITAL | Age: 65
End: 2020-08-05

## 2020-08-05 DIAGNOSIS — K62.5 RECTAL BLEEDING: ICD-10-CM

## 2020-08-09 ENCOUNTER — LAB VISIT (OUTPATIENT)
Dept: URGENT CARE | Facility: CLINIC | Age: 65
End: 2020-08-09
Payer: COMMERCIAL

## 2020-08-09 VITALS — TEMPERATURE: 97 F

## 2020-08-09 DIAGNOSIS — K62.5 RECTAL BLEEDING: ICD-10-CM

## 2020-08-09 PROCEDURE — 99211 OFF/OP EST MAY X REQ PHY/QHP: CPT | Mod: S$GLB,,, | Performed by: FAMILY MEDICINE

## 2020-08-09 PROCEDURE — 99211 PR OFFICE/OUTPT VISIT, EST, LEVL I: ICD-10-PCS | Mod: S$GLB,,, | Performed by: FAMILY MEDICINE

## 2020-08-09 PROCEDURE — U0003 INFECTIOUS AGENT DETECTION BY NUCLEIC ACID (DNA OR RNA); SEVERE ACUTE RESPIRATORY SYNDROME CORONAVIRUS 2 (SARS-COV-2) (CORONAVIRUS DISEASE [COVID-19]), AMPLIFIED PROBE TECHNIQUE, MAKING USE OF HIGH THROUGHPUT TECHNOLOGIES AS DESCRIBED BY CMS-2020-01-R: HCPCS

## 2020-08-09 NOTE — PROGRESS NOTES

## 2020-08-10 LAB — SARS-COV-2 RNA RESP QL NAA+PROBE: NOT DETECTED

## 2020-08-12 ENCOUNTER — ANESTHESIA (OUTPATIENT)
Dept: ENDOSCOPY | Facility: HOSPITAL | Age: 65
End: 2020-08-12
Payer: COMMERCIAL

## 2020-08-12 ENCOUNTER — ANESTHESIA EVENT (OUTPATIENT)
Dept: ENDOSCOPY | Facility: HOSPITAL | Age: 65
End: 2020-08-12
Payer: COMMERCIAL

## 2020-08-12 ENCOUNTER — HOSPITAL ENCOUNTER (OUTPATIENT)
Facility: HOSPITAL | Age: 65
Discharge: HOME | End: 2020-08-12
Attending: INTERNAL MEDICINE | Admitting: INTERNAL MEDICINE
Payer: COMMERCIAL

## 2020-08-12 VITALS
WEIGHT: 185 LBS | TEMPERATURE: 98 F | OXYGEN SATURATION: 100 % | BODY MASS INDEX: 29.03 KG/M2 | RESPIRATION RATE: 18 BRPM | HEIGHT: 67 IN | DIASTOLIC BLOOD PRESSURE: 95 MMHG | HEART RATE: 68 BPM | SYSTOLIC BLOOD PRESSURE: 135 MMHG

## 2020-08-12 DIAGNOSIS — K92.1 HEMATOCHEZIA: ICD-10-CM

## 2020-08-12 PROCEDURE — 37000008 HC ANESTHESIA 1ST 15 MINUTES: Performed by: INTERNAL MEDICINE

## 2020-08-12 PROCEDURE — 45346 SIGMOIDOSCOPY W/ABLATION: CPT | Performed by: INTERNAL MEDICINE

## 2020-08-12 PROCEDURE — D9220A PRA ANESTHESIA: ICD-10-PCS | Mod: CRNA,,, | Performed by: NURSE ANESTHETIST, CERTIFIED REGISTERED

## 2020-08-12 PROCEDURE — 63600175 PHARM REV CODE 636 W HCPCS: Performed by: NURSE ANESTHETIST, CERTIFIED REGISTERED

## 2020-08-12 PROCEDURE — 37000009 HC ANESTHESIA EA ADD 15 MINS: Performed by: INTERNAL MEDICINE

## 2020-08-12 PROCEDURE — D9220A PRA ANESTHESIA: Mod: ANES,,, | Performed by: ANESTHESIOLOGY

## 2020-08-12 PROCEDURE — C1886 CATHETER, ABLATION: HCPCS | Performed by: INTERNAL MEDICINE

## 2020-08-12 PROCEDURE — 25000003 PHARM REV CODE 250: Performed by: INTERNAL MEDICINE

## 2020-08-12 PROCEDURE — D9220A PRA ANESTHESIA: ICD-10-PCS | Mod: ANES,,, | Performed by: ANESTHESIOLOGY

## 2020-08-12 PROCEDURE — 45346 PR SIGMOIDOSCOPY W/ABLATION: ICD-10-PCS | Mod: ,,, | Performed by: INTERNAL MEDICINE

## 2020-08-12 PROCEDURE — D9220A PRA ANESTHESIA: Mod: CRNA,,, | Performed by: NURSE ANESTHETIST, CERTIFIED REGISTERED

## 2020-08-12 PROCEDURE — 45346 SIGMOIDOSCOPY W/ABLATION: CPT | Mod: ,,, | Performed by: INTERNAL MEDICINE

## 2020-08-12 RX ORDER — PROPOFOL 10 MG/ML
VIAL (ML) INTRAVENOUS
Status: DISCONTINUED | OUTPATIENT
Start: 2020-08-12 | End: 2020-08-12

## 2020-08-12 RX ORDER — PROPOFOL 10 MG/ML
VIAL (ML) INTRAVENOUS CONTINUOUS PRN
Status: DISCONTINUED | OUTPATIENT
Start: 2020-08-12 | End: 2020-08-12

## 2020-08-12 RX ORDER — FENTANYL CITRATE 50 UG/ML
25 INJECTION, SOLUTION INTRAMUSCULAR; INTRAVENOUS EVERY 5 MIN PRN
Status: DISCONTINUED | OUTPATIENT
Start: 2020-08-12 | End: 2020-08-12 | Stop reason: HOSPADM

## 2020-08-12 RX ORDER — SODIUM CHLORIDE 9 MG/ML
INJECTION, SOLUTION INTRAVENOUS CONTINUOUS
Status: DISCONTINUED | OUTPATIENT
Start: 2020-08-12 | End: 2020-08-12 | Stop reason: HOSPADM

## 2020-08-12 RX ORDER — SODIUM CHLORIDE 0.9 % (FLUSH) 0.9 %
3 SYRINGE (ML) INJECTION
Status: DISCONTINUED | OUTPATIENT
Start: 2020-08-12 | End: 2020-08-12 | Stop reason: HOSPADM

## 2020-08-12 RX ORDER — DIPHENHYDRAMINE HYDROCHLORIDE 50 MG/ML
25 INJECTION INTRAMUSCULAR; INTRAVENOUS EVERY 6 HOURS PRN
Status: DISCONTINUED | OUTPATIENT
Start: 2020-08-12 | End: 2020-08-12 | Stop reason: HOSPADM

## 2020-08-12 RX ORDER — LIDOCAINE HCL/PF 100 MG/5ML
SYRINGE (ML) INTRAVENOUS
Status: DISCONTINUED | OUTPATIENT
Start: 2020-08-12 | End: 2020-08-12

## 2020-08-12 RX ADMIN — PROPOFOL 80 MG: 10 INJECTION, EMULSION INTRAVENOUS at 11:08

## 2020-08-12 RX ADMIN — SODIUM CHLORIDE: 0.9 INJECTION, SOLUTION INTRAVENOUS at 10:08

## 2020-08-12 RX ADMIN — PROPOFOL 200 MCG/KG/MIN: 10 INJECTION, EMULSION INTRAVENOUS at 11:08

## 2020-08-12 RX ADMIN — Medication 100 MG: at 11:08

## 2020-08-12 NOTE — [employer] PATIENT INSTRUCTIONS
Discharge Summary/Instructions after an Endoscopic Procedure  Patient Name: Catrachito Kraft  Patient MRN: [id number]  Patient YOB: 1955 Wednesday, August 12, 2020  Amol Telles MD  RESTRICTIONS:  During your procedure today, you received medications for sedation.  These   medications may affect your judgment, balance and coordination.  Therefore,   for 24 hours, you have the following restrictions:   - DO NOT drive a car, operate machinery, make legal/financial decisions,   sign important papers or drink alcohol.    ACTIVITY:  Today: no heavy lifting, straining or running due to procedural   sedation/anesthesia.  The following day: return to full activity including work.  DIET:  Eat and drink normally unless instructed otherwise.     TREATMENT FOR COMMON SIDE EFFECTS:  - Mild abdominal pain, nausea, belching, bloating or excessive gas:  rest,   eat lightly and use a heating pad.  - Sore Throat: treat with throat lozenges and/or gargle with warm salt   water.  - Because air was used during the procedure, expelling large amounts of air   from your rectum or belching is normal.  - If a bowel prep was taken, you may not have a bowel movement for 1-3 days.    This is normal.  SYMPTOMS TO WATCH FOR AND REPORT TO YOUR PHYSICIAN:  1. Abdominal pain or bloating, other than gas cramps.  2. Chest pain.  3. Back pain.  4. Signs of infection such as: chills or fever occurring within 24 hours   after the procedure.  5. Rectal bleeding, which would show as bright red, maroon, or black stools.   (A tablespoon of blood from the rectum is not serious, especially if   hemorrhoids are present.)  6. Vomiting.  7. Weakness or dizziness.  GO DIRECTLY TO THE NEAREST EMERGENCY ROOM IF YOU HAVE ANY OF THE FOLLOWING:      Difficulty breathing              Chills and/or fever over 101 F   Persistent vomiting and/or vomiting blood   Severe abdominal pain   Severe chest pain   Black, tarry stools   Bleeding- more than one  tablespoon   Any other symptom or condition that you feel may need urgent attention  Your doctor recommends these additional instructions:  If any biopsies were taken, your doctors clinic will contact you in 1 to 2   weeks with any results.  - Discharge patient to home.   - Resume previous diet.   - Continue present medications.   - Resume previous diet.   - Repeat flexible sigmoidoscopy in 6 weeks for retreatment.  For questions, problems or results please call your physician - Amol Telles MD at Work:  [phone].  [facility], EMERGENCY ROOM PHONE NUMBER: [phone]  IF A COMPLICATION OR EMERGENCY SITUATION ARISES AND YOU ARE UNABLE TO REACH   YOUR PHYSICIAN - GO DIRECTLY TO THE EMERGENCY ROOM.  Amol Telles MD  8/12/2020 11:20:20 AM  This report has been verified and signed electronically.  [employer]

## 2020-08-12 NOTE — PLAN OF CARE
Discharge instructions reviewed with pt, understanding verbalized, all questions answered.   Pt able to tolerate PO intake, no distress/discomfort noted in pt.   Scant amount light pink-red blood noted from rectum, pt states bleeding is improved when compared to before procedure.  Dr Telles at bedside to see pt, findings discussed with pt. all questions answered.   Pt discharge home via WC to front parking per PCT.

## 2020-08-12 NOTE — ANESTHESIA PREPROCEDURE EVALUATION
08/12/2020  Pre-operative evaluation for Procedure(s) (LRB):  SIGMOIDOSCOPY, FLEXIBLE (N/A)    Catrachito Kraft is a 64 y.o. male here for above  EKG from one year ago reviewed: SR with PACs    Patient Active Problem List   Diagnosis    History of colon polyps    Prostate cancer    Weak urinary stream    Microscopic hematuria    Urinary frequency    Urgency of urination    Incomplete emptying of bladder    Heme positive stool    Radiation proctitis    Gross hematuria    Dysuria    Hematochezia       Review of patient's allergies indicates:  No Known Allergies    No current facility-administered medications on file prior to encounter.      Current Outpatient Medications on File Prior to Encounter   Medication Sig Dispense Refill    aspirin (ECOTRIN) 81 MG EC tablet Take 81 mg by mouth once daily.      atorvastatin (LIPITOR) 40 MG tablet Take 40 mg by mouth every evening.       esomeprazole (NEXIUM) 40 MG capsule Take 40 mg by mouth before breakfast.      ciprofloxacin HCl (CIPRO) 500 MG tablet Take 500 mg by mouth every 12 (twelve) hours.      ferrous sulfate 325 (65 FE) MG EC tablet Take 325 mg by mouth once daily.      leuprolide, 6 month, (ELIGARD) 45 mg injection Inject 45 mg into the skin every 6 (six) months.      meclizine (ANTIVERT) 25 mg tablet Take 1 tablet (25 mg total) by mouth 3 (three) times daily as needed. (Patient taking differently: Take 25 mg by mouth as needed. ) 20 tablet 0    ranitidine (ZANTAC) 150 MG tablet Take 150 mg by mouth as needed for Heartburn.         Past Surgical History:   Procedure Laterality Date    APPENDECTOMY      COLONOSCOPY N/A 12/4/2017    Procedure: HIGH RISK SCREENING COLONOSCOPY;  Surgeon: Lorne Hair MD;  Location: [facility];  Service: Endoscopy;  Laterality: N/A;    COLONOSCOPY N/A 1/24/2020    Procedure: COLONOSCOPY;  Surgeon:  Lorne Hair MD;  Location: [facility] ENDO;  Service: Endoscopy;  Laterality: N/A;    CYSTOSCOPY W/ RETROGRADES Bilateral 3/11/2020    Procedure: CYSTOSCOPY WITH RETROGRADE PYELOGRAM;  Surgeon: Aki El MD;  Location: [facility] OR;  Service: Urology;  Laterality: Bilateral;    DILATION OF URETHRA N/A 3/11/2020    Procedure: DILATION, URETHRAL STRICTURE;  Surgeon: Aki El MD;  Location: [facility] OR;  Service: Urology;  Laterality: N/A;    FLEXIBLE SIGMOIDOSCOPY N/A 2020    Procedure: SIGMOIDOSCOPY, FLEXIBLE/apc;  Surgeon: Amol Telles MD;  Location: [facility] ENDO (2ND FLR);  Service: Endoscopy;  Laterality: N/A;  out-pt blood work 20-tb    FLEXIBLE SIGMOIDOSCOPY N/A 2020    Procedure: SIGMOIDOSCOPY, FLEXIBLE/apc;  Surgeon: Amol Telles MD;  Location: [facility] ENDO (2ND FLR);  Service: Endoscopy;  Laterality: N/A;  Covid-19 test 20 at [facility] - pg    PROSTATE BIOPSY      RADICAL RETROPUBIC PROSTATECTOMY N/A 2019    Procedure: PROSTATECTOMY-RADICAL-RETROPUBIC using Harmonic scalpel;  Surgeon: Aki El MD;  Location: [facility];  Service: Urology;  Laterality: N/A;       Social History     Socioeconomic History    Marital status:      Spouse name: Not on file    Number of children: Not on file    Years of education: Not on file    Highest education level: Not on file   Occupational History    Not on file   Social Needs    Financial resource strain: Not on file    Food insecurity     Worry: Not on file     Inability: Not on file    Transportation needs     Medical: Not on file     Non-medical: Not on file   Tobacco Use    Smoking status: Former Smoker     Types: Cigarettes     Quit date:      Years since quittin.6    Smokeless tobacco: Never Used   Substance and Sexual Activity    Alcohol use: Yes     Comment: OCCAS    Drug use: No    Sexual activity: Not on file   Lifestyle    Physical activity     Days per week: Not on file      Minutes per session: Not on file    Stress: Not on file   Relationships    Social connections     Talks on phone: Not on file     Gets together: Not on file     Attends [employer] service: Not on file     Active member of club or organization: Not on file     Attends meetings of clubs or organizations: Not on file     Relationship status: Not on file   Other Topics Concern    Not on file   Social History Narrative    Not on file           Anesthesia Evaluation    I have reviewed the Patient Summary Reports.    I have reviewed the Nursing Notes. I have reviewed the NPO Status.      Review of Systems  Anesthesia Hx:  No problems with previous Anesthesia    Cardiovascular:  Cardiovascular Normal     Pulmonary:  Pulmonary Normal    Hepatic/GI:   Hiatal Hernia, GERD    Neurological:  Neurology Normal    Endocrine:  Endocrine Normal        Physical Exam  General:  Well nourished    Airway/Jaw/Neck:  Airway Findings: Mouth Opening: Normal Tongue: Normal  General Airway Assessment: Adult  Mallampati: II  TM Distance: Normal, at least 6 cm       Chest/Lungs:  Chest/Lungs Findings: Clear to auscultation, Normal Respiratory Rate     Heart/Vascular:  Heart Findings: Rate: Normal  Rhythm: Regular Rhythm             Anesthesia Plan  Type of Anesthesia, risks & benefits discussed:  Anesthesia Type:  general, MAC  Patient's Preference:   Intra-op Monitoring Plan: standard ASA monitors  Intra-op Monitoring Plan Comments:   Post Op Pain Control Plan: multimodal analgesia and IV/PO Opioids PRN  Post Op Pain Control Plan Comments:   Induction:   IV  Beta Blocker:         Informed Consent: Patient understands risks and agrees with Anesthesia plan.  Questions answered. Anesthesia consent signed with patient.  ASA Score: 3     Day of Surgery Review of History & Physical:            Ready For Surgery From Anesthesia Perspective.

## 2020-08-12 NOTE — TRANSFER OF CARE
"Anesthesia Transfer of Care Note    Patient: Catrachito Kraft    Procedure(s) Performed: Procedure(s) (LRB):  SIGMOIDOSCOPY, FLEXIBLE (N/A)    Patient location: [facility]    Anesthesia Type: general    Transport from OR: Transported from OR on room air with adequate spontaneous ventilation    Post pain: adequate analgesia    Post assessment: no apparent anesthetic complications and tolerated procedure well    Post vital signs: stable    Level of consciousness: sedated    Nausea/Vomiting: no nausea/vomiting    Complications: none    Transfer of care protocol was followed      Last vitals:   Visit Vitals  /86 (Patient Position: Lying)   Pulse 74   Temp 36.8 °C (98.3 °F) (Oral)   Resp 14   Ht 5' 7" (1.702 m)   Wt 83.9 kg (185 lb)   SpO2 99%   BMI 28.98 kg/m²     "

## 2020-08-12 NOTE — H&P
Short Stay Endoscopy History and Physical    PCP - Keron Leon MD  Referring Physician - Amol Telles MD  [address] [facility]  [address]  [place] [address]    Procedure - flex sig  ASA - per anesthesia  Mallampati - per anesthesia  History of Anesthesia problems - no  Family history Anesthesia problems -  no   Plan of anesthesia - General    HPI:  This is a 64 y.o. male here for evaluation of: hematochezia    Reflux - no  Dysphagia - no  Abdominal pain - no  Diarrhea - no    ROS:  Constitutional: No fevers, chills, No weight loss  CV: No chest pain  Pulm: No cough, No shortness of breath  Ophtho: No vision changes  GI: see HPI  Derm: No rash    Medical History:  has a past medical history of Abdominal aortic aneurysm without rupture, Colon polyps, Elevated cholesterol, GERD (gastroesophageal reflux disease), Hematochezia, Hiatal hernia, Hypercholesteremia, Proctitis, radiation, Prostate cancer, Rectal bleeding, Reflux esophagitis, Urinary incontinence, Vertigo, and Wears glasses.    Surgical History:  has a past surgical history that includes Appendectomy; Colonoscopy (N/A, 12/4/2017); Prostate biopsy; Radical retropubic prostatectomy (N/A, 4/8/2019); Colonoscopy (N/A, 1/24/2020); Flexible sigmoidoscopy (N/A, 2/14/2020); Cystoscopy w/ retrogrades (Bilateral, 3/11/2020); Dilation of urethra (N/A, 3/11/2020); and Flexible sigmoidoscopy (N/A, 6/26/2020).    Family History: family history includes Bladder Cancer in his father; COPD in his mother; Cancer in his father; Colon polyps in his father; Heart disease in his father and mother; Hyperlipidemia in his mother; Kidney disease in his father and mother; Prostate cancer in his father..    Social History:  reports that he quit smoking about 13 years ago. His smoking use included cigarettes. He has never used smokeless tobacco. He reports current alcohol use. He reports that he does not use drugs.    Review of patient's allergies indicates:  No Known  Allergies    Medications:   Medications Prior to Admission   Medication Sig Dispense Refill Last Dose    aspirin (ECOTRIN) 81 MG EC tablet Take 81 mg by mouth once daily.   8/11/2020 at Unknown time    atorvastatin (LIPITOR) 40 MG tablet Take 40 mg by mouth every evening.    8/11/2020 at Unknown time    esomeprazole (NEXIUM) 40 MG capsule Take 40 mg by mouth before breakfast.   8/11/2020 at Unknown time    ciprofloxacin HCl (CIPRO) 500 MG tablet Take 500 mg by mouth every 12 (twelve) hours.   More than a month at Unknown time    ferrous sulfate 325 (65 FE) MG EC tablet Take 325 mg by mouth once daily.       leuprolide, 6 month, (ELIGARD) 45 mg injection Inject 45 mg into the skin every 6 (six) months.       meclizine (ANTIVERT) 25 mg tablet Take 1 tablet (25 mg total) by mouth 3 (three) times daily as needed. (Patient taking differently: Take 25 mg by mouth as needed. ) 20 tablet 0 More than a month at Unknown time    ranitidine (ZANTAC) 150 MG tablet Take 150 mg by mouth as needed for Heartburn.   More than a month at Unknown time       Physical Exam:    Vital Signs:   Vitals:    08/12/20 1044   BP: 137/86   Pulse: 74   Resp: 14   Temp: 98.3 °F (36.8 °C)       General Appearance: Well appearing in no acute distress    Labs:  Lab Results   Component Value Date    WBC 5.70 04/13/2020    HGB 12.7 (L) 04/13/2020    HCT 37.5 (L) 04/13/2020     04/13/2020    ALT 59 (H) 08/29/2019    AST 37 08/29/2019     03/10/2020    K 4.2 03/10/2020     03/10/2020    CREATININE 0.90 03/10/2020    BUN 26 (H) 03/10/2020    CO2 29 03/10/2020    INR 1.0 03/26/2019       I have explained the risks and benefits of this endoscopic procedure to the patient including but not limited to bleeding, inflammation, infection, perforation, and death.      Amol Telles MD

## 2020-08-12 NOTE — DISCHARGE INSTRUCTIONS
Sigmoidoscopy    Sigmoidoscopy is a procedure used to view the lower colon and rectum. This test can help find the source of belly pain, rectal bleeding, and changes in bowel habits. Sigmoidoscopy is also used as part of the screening for colorectal cancer. It is done using a sigmoidoscope, a flexible tube with a viewing lens and light.  If youre 50 or over, the American Cancer Society recommends having this test in addition to stool tests, every 5 years to screen for colorectal cancer. Your healthcare provider may also recommend other colon cancer screening methods such as colonoscopy.   Getting ready  Here is how to prepare:  · Be sure to tell your healthcare provider about any medicines you take. Also tell your healthcare provider about any health conditions you may have.  · Ask your healthcare provider about the risks of the test. These include bleeding and bowel puncture.  · Your rectum and colon must be empty for the test, so be sure to follow the diet and bowel prep instructions. Otherwise the test may need to be rescheduled.  During the test  Here is what to expect:  · The test is done in the healthcare providers office or in a hospital endoscopy unit. You may wear a gown or a drape over your lower body.  · The procedure usually takes 10 to 20 minutes.  · The healthcare provider does a digital rectal exam to check for anal and rectal problems. The rectum is lubricated and the scope inserted.  · You may have a feeling similar to needing to have a bowel movement. You may also feel pressure when air is pumped into the colon This is done so that the healthcare provider can get a better view. Its expected that you will pass gas during the procedure.  After the test  Here is what to expect:  · Usually youll discuss the results with your healthcare provider right away, unless youre having other tests.  · If biopsies (tissue samples) were taken, you'll want to ask when to contact the for results.   · Try to  pass all the gas right after the test. Otherwise you may have bloating and cramping.  · After the test you can go back to your normal eating and other activities.  When to call your healthcare provider  Call if you have any of the following after the procedure:  · Pain in your belly  · Fever  · Dizziness or weakness  · Excessive rectal bleeding. Slight bleeding or spotting is normal, especially if a biopsy was taken.   Date Last Reviewed: 7/1/2016 © 2000-2017 [employer] [address] [place] [address]. All rights reserved. This information is not intended as a substitute for professional medical care. Always follow your healthcare professional's instructions.

## 2020-08-13 NOTE — ANESTHESIA POSTPROCEDURE EVALUATION
Anesthesia Post Evaluation    Patient: Catrachito Kraft    Procedure(s) Performed: Procedure(s) (LRB):  SIGMOIDOSCOPY, FLEXIBLE (N/A)    Final Anesthesia Type: general    Patient location during evaluation: PACU  Patient participation: Yes- Able to Participate  Level of consciousness: awake and alert  Post-procedure vital signs: reviewed and stable  Pain management: adequate  Airway patency: patent    PONV status at discharge: No PONV  Anesthetic complications: no      Cardiovascular status: blood pressure returned to baseline  Respiratory status: unassisted  Hydration status: euvolemic  Follow-up not needed.          Vitals Value Taken Time   /95 08/12/20 1203   Temp 36.4 °C (97.5 °F) 08/12/20 1200   Pulse 66 08/12/20 1204   Resp 18 08/12/20 1212   SpO2 93 % 08/12/20 1204   Vitals shown include unvalidated device data.      No case tracking events are documented in the log.      Pain/Roberto Score: Roberto Score: 10 (8/12/2020 12:12 PM)

## 2020-08-18 ENCOUNTER — TELEPHONE (OUTPATIENT)
Dept: ENDOSCOPY | Facility: HOSPITAL | Age: 65
End: 2020-08-18

## 2020-08-18 DIAGNOSIS — K62.89 PROCTITIS: Primary | ICD-10-CM

## 2020-08-21 ENCOUNTER — TELEPHONE (OUTPATIENT)
Dept: ENDOSCOPY | Facility: HOSPITAL | Age: 65
End: 2020-08-21

## 2020-08-21 NOTE — TELEPHONE ENCOUNTER
Mr Kraft reports that he has not had any bleeding since last procedure. He would like to hold off any any other procedure right now. He will call to schedule if he has any issues.

## 2020-11-17 PROBLEM — R33.9 URINARY RETENTION: Status: ACTIVE | Noted: 2020-11-17

## 2020-11-20 PROBLEM — N32.0 ACQUIRED CONTRACTURE OF BLADDER NECK: Status: ACTIVE | Noted: 2020-11-20

## 2020-11-27 ENCOUNTER — PATIENT MESSAGE (OUTPATIENT)
Dept: GASTROENTEROLOGY | Facility: CLINIC | Age: 65
End: 2020-11-27

## 2021-03-15 PROBLEM — N30.40 RADIATION CYSTITIS: Status: ACTIVE | Noted: 2021-03-15

## 2022-05-12 PROBLEM — R74.01 TRANSAMINASEMIA: Status: ACTIVE | Noted: 2022-05-12

## 2022-05-12 PROBLEM — D64.9 NORMOCYTIC ANEMIA: Status: ACTIVE | Noted: 2022-05-12

## 2022-05-12 PROBLEM — E87.20 METABOLIC ACIDEMIA: Status: ACTIVE | Noted: 2022-05-12

## 2022-05-12 PROBLEM — R74.8 ALKALINE PHOSPHATASE ELEVATION: Status: ACTIVE | Noted: 2022-05-12

## 2022-05-12 PROBLEM — R91.8 LUNG NODULE, MULTIPLE: Status: ACTIVE | Noted: 2022-05-12

## 2022-05-12 PROBLEM — I50.32 CHRONIC HEART FAILURE WITH PRESERVED EJECTION FRACTION (HFPEF): Status: ACTIVE | Noted: 2022-05-12

## 2022-05-12 PROBLEM — J84.112 IPF (IDIOPATHIC PULMONARY FIBROSIS): Status: ACTIVE | Noted: 2022-05-12

## 2022-05-12 PROBLEM — R79.89 TROPONIN I ABOVE REFERENCE RANGE: Status: ACTIVE | Noted: 2022-05-12

## 2022-05-13 PROBLEM — B33.8 RSV INFECTION: Status: ACTIVE | Noted: 2022-05-13

## 2022-05-16 PROBLEM — Z43.5 ENCOUNTER FOR CARE OR REPLACEMENT OF SUPRAPUBIC TUBE: Status: ACTIVE | Noted: 2022-05-16

## 2022-08-03 NOTE — TELEPHONE ENCOUNTER
Spoke with patient about instructions for Flex Sig scheduled 8/12/20 at 1130.  Covid-19 test 8/9/20 at 1300 at [facility].  Instructions sent to [facility].  
impaired balance/decreased flexibility/pain/decreased ROM/decreased strength

## 2023-10-13 ENCOUNTER — PATIENT MESSAGE (OUTPATIENT)
Dept: SURGERY | Facility: CLINIC | Age: 68
End: 2023-10-13
Payer: COMMERCIAL

## 2023-10-16 ENCOUNTER — TELEPHONE (OUTPATIENT)
Dept: SURGERY | Facility: CLINIC | Age: 68
End: 2023-10-16
Payer: COMMERCIAL

## 2023-10-16 NOTE — TELEPHONE ENCOUNTER
Spoke with patient. Reports 10/10 pain, intermittent constipation with diarrhea, no fever. Patient has history of proctitis and diverticulitis.Patient cannot take antibiotics due to sclerosis medication.  Instructed patient to go to [facility] ED for  imaging.

## 2023-10-20 ENCOUNTER — PATIENT MESSAGE (OUTPATIENT)
Dept: SURGERY | Facility: CLINIC | Age: 68
End: 2023-10-20
Payer: COMMERCIAL

## 2023-11-13 ENCOUNTER — OFFICE VISIT (OUTPATIENT)
Dept: SURGERY | Facility: CLINIC | Age: 68
End: 2023-11-13
Payer: COMMERCIAL

## 2023-11-13 VITALS
HEIGHT: 67 IN | WEIGHT: 160.06 LBS | SYSTOLIC BLOOD PRESSURE: 112 MMHG | HEART RATE: 86 BPM | BODY MASS INDEX: 25.12 KG/M2 | DIASTOLIC BLOOD PRESSURE: 77 MMHG

## 2023-11-13 DIAGNOSIS — K62.7 CHRONIC RADIATION PROCTITIS: Primary | ICD-10-CM

## 2023-11-13 PROCEDURE — 1101F PR PT FALLS ASSESS DOC 0-1 FALLS W/OUT INJ PAST YR: ICD-10-PCS | Mod: CPTII,S$GLB,, | Performed by: COLON & RECTAL SURGERY

## 2023-11-13 PROCEDURE — 1101F PT FALLS ASSESS-DOCD LE1/YR: CPT | Mod: CPTII,S$GLB,, | Performed by: COLON & RECTAL SURGERY

## 2023-11-13 PROCEDURE — 3074F PR MOST RECENT SYSTOLIC BLOOD PRESSURE < 130 MM HG: ICD-10-PCS | Mod: CPTII,S$GLB,, | Performed by: COLON & RECTAL SURGERY

## 2023-11-13 PROCEDURE — 3008F PR BODY MASS INDEX (BMI) DOCUMENTED: ICD-10-PCS | Mod: CPTII,S$GLB,, | Performed by: COLON & RECTAL SURGERY

## 2023-11-13 PROCEDURE — 1125F AMNT PAIN NOTED PAIN PRSNT: CPT | Mod: CPTII,S$GLB,, | Performed by: COLON & RECTAL SURGERY

## 2023-11-13 PROCEDURE — 3074F SYST BP LT 130 MM HG: CPT | Mod: CPTII,S$GLB,, | Performed by: COLON & RECTAL SURGERY

## 2023-11-13 PROCEDURE — 99202 OFFICE O/P NEW SF 15 MIN: CPT | Mod: S$GLB,,, | Performed by: COLON & RECTAL SURGERY

## 2023-11-13 PROCEDURE — 99999 PR PBB SHADOW E&M-EST. PATIENT-LVL IV: ICD-10-PCS | Mod: PBBFAC,,, | Performed by: COLON & RECTAL SURGERY

## 2023-11-13 PROCEDURE — 99202 PR OFFICE/OUTPT VISIT, NEW, LEVL II, 15-29 MIN: ICD-10-PCS | Mod: S$GLB,,, | Performed by: COLON & RECTAL SURGERY

## 2023-11-13 PROCEDURE — 3078F PR MOST RECENT DIASTOLIC BLOOD PRESSURE < 80 MM HG: ICD-10-PCS | Mod: CPTII,S$GLB,, | Performed by: COLON & RECTAL SURGERY

## 2023-11-13 PROCEDURE — 3008F BODY MASS INDEX DOCD: CPT | Mod: CPTII,S$GLB,, | Performed by: COLON & RECTAL SURGERY

## 2023-11-13 PROCEDURE — 1125F PR PAIN SEVERITY QUANTIFIED, PAIN PRESENT: ICD-10-PCS | Mod: CPTII,S$GLB,, | Performed by: COLON & RECTAL SURGERY

## 2023-11-13 PROCEDURE — 1160F PR REVIEW ALL MEDS BY PRESCRIBER/CLIN PHARMACIST DOCUMENTED: ICD-10-PCS | Mod: CPTII,S$GLB,, | Performed by: COLON & RECTAL SURGERY

## 2023-11-13 PROCEDURE — 1159F PR MEDICATION LIST DOCUMENTED IN MEDICAL RECORD: ICD-10-PCS | Mod: CPTII,S$GLB,, | Performed by: COLON & RECTAL SURGERY

## 2023-11-13 PROCEDURE — 99999 PR PBB SHADOW E&M-EST. PATIENT-LVL IV: CPT | Mod: PBBFAC,,, | Performed by: COLON & RECTAL SURGERY

## 2023-11-13 PROCEDURE — 3078F DIAST BP <80 MM HG: CPT | Mod: CPTII,S$GLB,, | Performed by: COLON & RECTAL SURGERY

## 2023-11-13 PROCEDURE — 1159F MED LIST DOCD IN RCRD: CPT | Mod: CPTII,S$GLB,, | Performed by: COLON & RECTAL SURGERY

## 2023-11-13 PROCEDURE — 3288F PR FALLS RISK ASSESSMENT DOCUMENTED: ICD-10-PCS | Mod: CPTII,S$GLB,, | Performed by: COLON & RECTAL SURGERY

## 2023-11-13 PROCEDURE — 1160F RVW MEDS BY RX/DR IN RCRD: CPT | Mod: CPTII,S$GLB,, | Performed by: COLON & RECTAL SURGERY

## 2023-11-13 PROCEDURE — 3288F FALL RISK ASSESSMENT DOCD: CPT | Mod: CPTII,S$GLB,, | Performed by: COLON & RECTAL SURGERY

## 2023-11-13 NOTE — LETTER
November 13, 2023      Keron Leon MD  [address] [place] [id number]           [facility]  [address] [place] [id number]  Phone: [phone]  Fax: [phone]          Patient: Catrachito Kraft   MR Number: [id number]   YOB: 1955   Date of Visit: 11/13/2023       Dear Dr Leon:    Thank you for referring Catrachito Kraft to me for evaluation. Attached you will find relevant portions of my assessment and plan of care.    If you have questions, please do not hesitate to call me. I look forward to following Catrachito Kraft along with you.    Sincerely,    Shelton Aguirre MD    Enclosure  CC:  No Recipients    If you would like to receive this communication electronically, please contact [email or web address] or [phone] to request more information on [email or web address] Link access.    For providers and/or their staff who would like to refer a patient to [facility], please contact us through our one-stop-shop provider referral line, [facility], at [phone].    If you feel you have received this communication in error or would no longer like to receive these types of communications, please e-mail [email or web address]

## 2023-11-13 NOTE — PROGRESS NOTES
Colon and Rectal Surgery History and Physical    Patient name: Catrachito Kraft   YOB: 1955   MRN: [id number]    Subjective:       Patient ID: Catrachito Kraft is a 68 y.o. male.    Chief Complaint: Diverticulitis    HPI  Mr. Kraft is a 69yo male hx of interstitial lung disease, prostate cancer s/p radial prostatectomy and subsequent radiation who presents with rectal pain. Mr. Kraft underwent radial prostatectomy in April 2019, this was followed by 30 rounds of radiation. He states that shortly after this treatment he developed issues with rectal bleeding. He attempted hydrocortisone and mesalamine suppositories with minimal success. He then underwent hyperbaric treatment which improved his issues with bleeding. However now he has developed rectal pain. He states that for the last year or so the pain is unbearable and he takes hydrocodone for relief. Pain is brought on by hard stool and constipation episodes. He has been started on Linzess which initially caused him diarrhea but has subsequently found a happy medium and keeps his stools mostly soft. However with the hydrocodone use he does notice some issues with hard stool. Pain is described as severe and causes him urgency with bowel movements. The hydrocodone helps with the pain but utlimately he feels it may lead to additional problems as it causes him to have hard stools with regular use. Denies fever, chills, nasuea, vomiting.   Last colonoscopy - 9/21/23 - mild proctitis, diverticulosis of sigmoid colon  Fhx of colon cancer in uncles and grandfather      Review of patient's allergies indicates:   Allergen Reactions    Adhesive tape-silicones      Other reaction(s): rash, redness, and itching       Past Medical History:   Diagnosis Date    Abdominal aortic aneurysm without rupture     Abdominal pain 2020    Asthma     Colon polyps     Elevated cholesterol     GERD (gastroesophageal reflux disease)     Hematochezia     Hiatal hernia     History of  radiation therapy     Hypercholesteremia     Hypertension     PONV (postoperative nausea and vomiting)     x1    Presence of indwelling urinary catheter 11/17/2020    Proctitis, radiation     Prostate cancer 2019    radiation    Rectal bleeding     Rectal bleeding 2020    due to radiation treatment for prostate cancer,  having laser procedures to treat     Reflux esophagitis     Renal disorder     uti    Systemic sclerosis     Urinary incontinence     Vertigo     Wears glasses        Past Surgical History:   Procedure Laterality Date    APPENDECTOMY      COLONOSCOPY N/A 12/4/2017    Procedure: HIGH RISK SCREENING COLONOSCOPY;  Surgeon: Lorne Hair MD;  Location: [facility] ENDO;  Service: Endoscopy;  Laterality: N/A;    COLONOSCOPY N/A 1/24/2020    Procedure: COLONOSCOPY;  Surgeon: Lorne Hair MD;  Location: [facility] ENDO;  Service: Endoscopy;  Laterality: N/A;    COLONOSCOPY N/A 9/21/2023    Procedure: COLONOSCOPY;  Surgeon: Lorne Hair MD;  Location: [facility] ENDO;  Service: Endoscopy;  Laterality: N/A;    CYSTOSCOPY  12/14/2020    Procedure: CYSTOSCOPY;  Surgeon: Aki El MD;  Location: [facility] OR;  Service: Urology;;    CYSTOSCOPY W/ RETROGRADES Bilateral 3/11/2020    Procedure: CYSTOSCOPY WITH RETROGRADE PYELOGRAM;  Surgeon: Aki El MD;  Location: [facility] OR;  Service: Urology;  Laterality: Bilateral;    CYSTOSCOPY W/ RETROGRADES Bilateral 6/16/2022    Procedure: CYSTOSCOPY WITH RETROGRADE PYELOGRAM;  Surgeon: Aki El MD;  Location: [facility] OR;  Service: Urology;  Laterality: Bilateral;    DILATION OF URETHRA N/A 3/11/2020    Procedure: DILATION, URETHRAL STRICTURE;  Surgeon: Aki El MD;  Location: [facility] OR;  Service: Urology;  Laterality: N/A;    DILATION OF URETHRA  11/20/2020    Procedure: DILATION, URETHRA;  Surgeon: Aki El MD;  Location: [facility] OR;  Service: Urology;;    EYE SURGERY Bilateral     cataract    FLEXIBLE SIGMOIDOSCOPY N/A 2/14/2020    Procedure:  SIGMOIDOSCOPY, FLEXIBLE/apc;  Surgeon: Amol Telles MD;  Location: [facility] (2ND FLR);  Service: Endoscopy;  Laterality: N/A;  out-pt blood work 2/4/20-tb    FLEXIBLE SIGMOIDOSCOPY N/A 6/26/2020    Procedure: SIGMOIDOSCOPY, FLEXIBLE/apc;  Surgeon: Amol Telles MD;  Location: [facility] (2ND FLR);  Service: Endoscopy;  Laterality: N/A;  Covid-19 test 6/24/20 at [facility] - pg    FLEXIBLE SIGMOIDOSCOPY N/A 8/12/2020    Procedure: SIGMOIDOSCOPY, FLEXIBLE;  Surgeon: Amol eTlles MD;  Location: [facility] (2ND FLR);  Service: Endoscopy;  Laterality: N/A;  Covid-19 test 8/9/20 at [facility] - pg    PROSTATE BIOPSY      RADICAL RETROPUBIC PROSTATECTOMY N/A 4/8/2019    Procedure: PROSTATECTOMY-RADICAL-RETROPUBIC using Harmonic scalpel;  Surgeon: Aki El MD;  Location: [facility];  Service: Urology;  Laterality: N/A;       Current Outpatient Medications   Medication Sig Dispense Refill    albuterol (PROVENTIL/VENTOLIN HFA) 90 mcg/actuation inhaler Inhale 2 puffs into the lungs every 6 (six) hours as needed.      aspirin (ECOTRIN) 81 MG EC tablet Take 81 mg by mouth once daily.      atorvastatin (LIPITOR) 40 MG tablet Take 40 mg by mouth every evening.       cholecalciferol, vitamin D3, 125 mcg (5,000 unit) Tab Vitamin D3 125 mcg (5,000 unit) tablet, [RxNorm: 021651]      ciprofloxacin HCl (CIPRO) 500 MG tablet Take 500 mg by mouth once daily.      cyclobenzaprine (FLEXERIL) 10 MG tablet Take 10 mg by mouth 3 (three) times daily as needed for Muscle spasms.      ergocalciferol (ERGOCALCIFEROL) 50,000 unit Cap Take 50,000 Units by mouth every 7 days.      esomeprazole (NEXIUM) 40 MG capsule Take 40 mg by mouth 2 (two) times daily before meals.      fluorouraciL (EFUDEX) 5 % cream APPLY 1 APPLICATION ON THE SKIN NIGHTLY FOR 1 - 2 WEEKS.      fluticasone-umeclidin-vilanter (TRELEGY ELLIPTA) 100-62.5-25 mcg DsDv Inhale 1 puff into the lungs once daily. 90 each 3     HYDROcodone-acetaminophen (NORCO) 5-325 mg per tablet Take 1 tablet by mouth every 6 (six) hours as needed for Pain.      hydrocortisone (CORTEF) 5 MG Tab Take 5 mg by mouth as needed.      LINZESS 145 mcg Cap capsule Take 145 mcg by mouth.      meclizine (ANTIVERT) 25 mg tablet Take 1 tablet (25 mg total) by mouth 3 (three) times daily as needed. (Patient taking differently: Take 25 mg by mouth as needed.) 20 tablet 0    mycophenolate (CELLCEPT) 500 mg Tab Take 1,000 mg by mouth 2 (two) times daily.      NIFEdipine (ADALAT CC) 30 MG TbSR Take 30 mg by mouth once daily.      nystatin (MYCOSTATIN) 100,000 unit/mL suspension Take 5 mLs by mouth 4 (four) times daily.      oxybutynin (DITROPAN) 5 MG Tab Take 5 mg by mouth daily as needed.      penicillin v potassium (VEETID) 500 MG tablet penicillin V potassium 500 mg tablet, [RxNorm: 628282]      LIDOCAINE 2 %, VALIUM 5 MG, BACLOFEN 4 % SUPPOSITORY Place 1 suppository rectally 2 (two) times daily. 30 each 4    predniSONE (DELTASONE) 10 MG tablet 3 tabs x3 days, then 2 x3 days, then 1 x3 days (Patient not taking: Reported on 11/8/2023) 18 tablet 0     No current facility-administered medications for this visit.     Facility-Administered Medications Ordered in Other Visits   Medication Dose Route Frequency Provider Last Rate Last Admin    0.9%  NaCl infusion   Intravenous Continuous Lorne Hair MD   Stopped at 09/21/23 0900       Family History   Problem Relation Age of Onset    Heart disease Mother     COPD Mother     Hyperlipidemia Mother     Kidney disease Mother     Heart disease Father     Kidney disease Father     Cancer Father         prostate    Colon polyps Father     Prostate cancer Father     Bladder Cancer Father        Social History     Socioeconomic History    Marital status:    Tobacco Use    Smoking status: Former     Current packs/day: 0.00     Average packs/day: 2.5 packs/day for 25.0 years (62.5 ttl pk-yrs)     Types: Cigarettes      "Start date:      Quit date: 2005     Years since quittin.8     Passive exposure: Past    Smokeless tobacco: Never   Substance and Sexual Activity    Alcohol use: Yes     Comment: OCCAS    Drug use: Not Currently     Types: Marijuana       Review of Systems   Constitutional:  Negative for chills and fever.   HENT:  Negative for congestion and sinus pressure.    Eyes:  Negative for visual disturbance.   Respiratory:  Negative for cough and shortness of breath.    Cardiovascular:  Negative for chest pain and palpitations.   Gastrointestinal:  Positive for constipation, diarrhea and rectal pain. Negative for abdominal distention, abdominal pain, anal bleeding, blood in stool, nausea and vomiting.   Endocrine: Negative for cold intolerance and heat intolerance.   Genitourinary:  Negative for dysuria and frequency.        Urinary incontinence   Musculoskeletal:  Negative for arthralgias and back pain.   Skin:  Negative for rash.   Allergic/Immunologic: Negative for immunocompromised state.   Neurological:  Negative for dizziness, light-headedness and headaches.   Psychiatric/Behavioral:  Negative for confusion. The patient is not nervous/anxious.        Objective:    /77 (BP Location: Left arm, Patient Position: Sitting, BP Method: Small (Automatic))   Pulse 86   Ht 5' 7" (1.702 m)   Wt 72.6 kg (160 lb 0.9 oz)   BMI 25.07 kg/m²   Physical Exam  Constitutional:       Appearance: He is well-developed.   HENT:      Head: Normocephalic and atraumatic.   Eyes:      Conjunctiva/sclera: Conjunctivae normal.   Cardiovascular:      Heart sounds: Normal heart sounds.   Pulmonary:      Effort: Pulmonary effort is normal. No respiratory distress.   Abdominal:      General: Bowel sounds are normal. There is no distension.      Palpations: Abdomen is soft. There is no mass.      Tenderness: There is no abdominal tenderness. There is no guarding or rebound.   Musculoskeletal:      Cervical back: Normal range of motion " and neck supple.   Skin:     General: Skin is warm and dry.      Findings: No erythema.   Neurological:      Mental Status: He is alert and oriented to person, place, and time.       Laboratory Studies:  Complete Blood Count:  Lab Results   Component Value Date/Time    WBC 16.00 (H) 06/16/2022 11:45 AM    HGB 12.7 (L) 06/16/2022 11:45 AM    HCT 38.4 (L) 06/16/2022 11:45 AM    RBC 4.31 (L) 06/16/2022 11:45 AM     06/16/2022 11:45 AM       Basic Chemistry Panel:  Lab Results   Component Value Date/Time     06/16/2022 11:45 AM    K 4.3 06/16/2022 11:45 AM     06/16/2022 11:45 AM    CO2 24 06/16/2022 11:45 AM    BUN 17 06/16/2022 11:45 AM    CREATININE 1.08 06/16/2022 11:45 AM    CALCIUM 9.4 06/16/2022 11:45 AM       Lab Results   Component Value Date/Time    .0 (H) 05/12/2022 03:49 PM           Assessment:       1. Chronic radiation proctitis      67yo male who presents with radiation proctitis after treatment for prostate cancer. Patient has previously undergone hyperbaric treatment and mesalamine suppositories with improvement in bleedign 2/2 to proctitis. He now presents primarily with pain and tenesmus. He previously had issues with constipation which has improved some with Linzess. However to control pain he takes hydrocodone which does give him occasional hard stool that exacerbates the problem. ]    Plan:       Will try baclofen-vallium-lidocaine suppositories  Discussed need for regular stool softeners to keep stool at a soft consistency while taking hydrocodone, recommended colace twice daily      Romain Hernandez MD  Colon & Rectal Fellow    I have interviewed and examined the patient, reviewed the notes and assessments, and/or personally supervised the procedure(s) performed by Dr. Hernandez, and I concur with her/his documentation of Catrachito Kraft.  See below addendum for my evaluation and additional findings.    67 yo M with radiation proctitis - he underwent radical prostatectomy in  2019 followed by what he says was 30 rounds of XRT.  He developed significant rectal bleeding afterwards due to radiation proctitis - managed with HBOT, which essentially resolved the bleeding. However, he now experiences significant tenesmus when rectum fills with stool and he has a BM. Pain is only present when this happens but is severe enough to need narcotic analgesics for relief.  +Constipation - using Linzess now with some relief, not using stool softener or other laxatives regularly. Colonoscopy in Sept did not show any evidence of rectal stricture.    Explained to patient that what he is experiencing is likely the long-term sequelae of chronic radiation proctitis with fibrosis of rectal wall and decreased compliance.  Will trial compounded suppositories (valium/baclofen/lidocaine) for symptomatic relief. Advised that he take stool softeners regularly while taking narcotics, though he should really attempt to wean narcotics off if he can.    Explained that the only real surgical option would be a diverting colostomy for symptomatic relief, which he does not want to consider at this point.    RTO prn.       Shelton Aguirre MD, FACS, FASCRS  Senior Staff Surgeon  Department of Colon & Rectal Surgery

## 2023-11-14 PROBLEM — M34.81 SYSTEMIC SCLEROSIS WITH LUNG INVOLVEMENT: Status: ACTIVE | Noted: 2023-11-14

## 2024-01-22 ENCOUNTER — TELEPHONE (OUTPATIENT)
Dept: TRANSPLANT | Facility: CLINIC | Age: 69
End: 2024-01-22
Payer: COMMERCIAL

## 2024-01-22 DIAGNOSIS — J84.9 ILD (INTERSTITIAL LUNG DISEASE): ICD-10-CM

## 2024-01-22 DIAGNOSIS — M34.81 SYSTEMIC SCLEROSIS WITH LUNG INVOLVEMENT: Primary | ICD-10-CM

## 2024-01-22 NOTE — TELEPHONE ENCOUNTER
"Received the plan of care for the ALD referral from Dr. Gonzales.  Communicated the plan with the patient via portal messages. With the patient's approval, his appointments have been scheduled on Monday, 24.       ----- Message from Jigna Gonzales DO sent at 2024  2:27 PM CST -----  Regarding: RE: ALD referral  Appropriate for ALD.  PFTs and 6MWT.  THanks    ----- Message -----  From: Mariajose Nuñez RN  Sent: 2024   1:41 PM CST  To: Jigna Gonzales DO  Subject: ALD referral                                     Advanced Lung Disease (ALD) Clinic Referral Note    Referral from: [facility], [facility], on home Oxygen    Lung diagnosis: Dr. Simon ([facility])     Age: 68 y.o.    Height/Weight/BMI:  5' 7" / 72.6 kg / 25.06 kg/m²    Smoking history: Social History    Tobacco Use      Smoking status: Former        Packs/day: 0.00        Years: 2.5 packs/day for 25.0 years (62.5 ttl pk-yrs)        Types: Cigarettes        Start date:         Quit date:         Years since quittin.0        Passive exposure: Past      Smokeless tobacco: Never    PFT date: 10/20/2023  FVC 3.83 (98%) FEV1 3.27 (114%) TLC 4.58 (73%) DLCO 7.63 (28%)    6 MWT date: 2023  Distance 1550 feet and patient did demonstrate ongoing desaturation with exercise and requires 4 liters with exertion    CXR date: 2024  Impression: Peripheral interstitial prominence appears to represent a chronic finding and suggests a component of chronic interstitial lung disease.  There has been interval increased focal peripheral opacity at the right mid to lower lung zone associated with linear opacities which may indicate progressed focal fibrotic change/scarring, atelectasis, or pneumonitis.    Chest CT date: 01/10/2024  1. Stable UIP and severe emphysema.  2. Stable bilateral lower lobe nodules.  3. Dilated fluid-filled esophagus consistent with scleroderma.      Echo date: 2023    1. The study quality is good.   2. " Global left ventricular systolic function is normal. The left   ventricular ejection fraction is 55-60%.   3. A bubble study was performed to rule out patent foramen ovale. The   bubble study was negative, ruling out patent foramen ovale.    4. Mild calcification of the mitral valve is noted. Mild (1+) mitral   regurgitation.   5. Trace aortic regurgitation. Trace tricuspid regurgitation. Trace   pulmonic regurgitation.    6. The pulmonary artery systolic pressure is 29 mmHg.       Other pertinent medical history: High cholesterol, GERD, prostate cancer - s/p radial prostatectomy in April 2019 followed by 30 rounds of radiation -- chronic radiation proctitis with fibrosis of rectal wall and decreased compliance    Recommendations?

## 2024-01-26 ENCOUNTER — TELEPHONE (OUTPATIENT)
Dept: TRANSPLANT | Facility: CLINIC | Age: 69
End: 2024-01-26
Payer: COMMERCIAL

## 2024-01-29 ENCOUNTER — HOSPITAL ENCOUNTER (OUTPATIENT)
Dept: PULMONOLOGY | Facility: CLINIC | Age: 69
Discharge: HOME | End: 2024-01-29
Payer: COMMERCIAL

## 2024-01-29 ENCOUNTER — OFFICE VISIT (OUTPATIENT)
Dept: TRANSPLANT | Facility: CLINIC | Age: 69
End: 2024-01-29
Payer: COMMERCIAL

## 2024-01-29 ENCOUNTER — TELEPHONE (OUTPATIENT)
Dept: TRANSPLANT | Facility: CLINIC | Age: 69
End: 2024-01-29

## 2024-01-29 VITALS
HEART RATE: 81 BPM | BODY MASS INDEX: 25.4 KG/M2 | WEIGHT: 158.06 LBS | HEIGHT: 66 IN | RESPIRATION RATE: 16 BRPM | OXYGEN SATURATION: 95 % | TEMPERATURE: 98 F | DIASTOLIC BLOOD PRESSURE: 85 MMHG | SYSTOLIC BLOOD PRESSURE: 137 MMHG

## 2024-01-29 VITALS — HEIGHT: 66 IN | BODY MASS INDEX: 25.19 KG/M2 | WEIGHT: 156.75 LBS

## 2024-01-29 DIAGNOSIS — M34.81 SYSTEMIC SCLEROSIS WITH LUNG INVOLVEMENT: ICD-10-CM

## 2024-01-29 DIAGNOSIS — J96.11 CHRONIC RESPIRATORY FAILURE WITH HYPOXIA, ON HOME O2 THERAPY: ICD-10-CM

## 2024-01-29 DIAGNOSIS — J84.9 ILD (INTERSTITIAL LUNG DISEASE): ICD-10-CM

## 2024-01-29 DIAGNOSIS — Z99.81 CHRONIC RESPIRATORY FAILURE WITH HYPOXIA, ON HOME O2 THERAPY: ICD-10-CM

## 2024-01-29 DIAGNOSIS — J84.10 COMBINED PULMONARY FIBROSIS AND EMPHYSEMA (CPFE): ICD-10-CM

## 2024-01-29 DIAGNOSIS — J43.9 COMBINED PULMONARY FIBROSIS AND EMPHYSEMA (CPFE): ICD-10-CM

## 2024-01-29 PROCEDURE — 94727 GAS DIL/WSHOT DETER LNG VOL: CPT | Mod: NTX,S$GLB,, | Performed by: INTERNAL MEDICINE

## 2024-01-29 PROCEDURE — 1160F RVW MEDS BY RX/DR IN RCRD: CPT | Mod: CPTII,NTX,S$GLB, | Performed by: INTERNAL MEDICINE

## 2024-01-29 PROCEDURE — 1125F AMNT PAIN NOTED PAIN PRSNT: CPT | Mod: CPTII,NTX,S$GLB, | Performed by: INTERNAL MEDICINE

## 2024-01-29 PROCEDURE — 99999 PR PBB SHADOW E&M-EST. PATIENT-LVL V: CPT | Mod: PBBFAC,TXP,, | Performed by: INTERNAL MEDICINE

## 2024-01-29 PROCEDURE — 94729 DIFFUSING CAPACITY: CPT | Mod: NTX,S$GLB,, | Performed by: INTERNAL MEDICINE

## 2024-01-29 PROCEDURE — 3008F BODY MASS INDEX DOCD: CPT | Mod: CPTII,NTX,S$GLB, | Performed by: INTERNAL MEDICINE

## 2024-01-29 PROCEDURE — 3075F SYST BP GE 130 - 139MM HG: CPT | Mod: CPTII,NTX,S$GLB, | Performed by: INTERNAL MEDICINE

## 2024-01-29 PROCEDURE — 1101F PT FALLS ASSESS-DOCD LE1/YR: CPT | Mod: CPTII,NTX,S$GLB, | Performed by: INTERNAL MEDICINE

## 2024-01-29 PROCEDURE — 99204 OFFICE O/P NEW MOD 45 MIN: CPT | Mod: 25,NTX,S$GLB, | Performed by: INTERNAL MEDICINE

## 2024-01-29 PROCEDURE — 94010 BREATHING CAPACITY TEST: CPT | Mod: 59,NTX,S$GLB, | Performed by: INTERNAL MEDICINE

## 2024-01-29 PROCEDURE — 3079F DIAST BP 80-89 MM HG: CPT | Mod: CPTII,NTX,S$GLB, | Performed by: INTERNAL MEDICINE

## 2024-01-29 PROCEDURE — 1159F MED LIST DOCD IN RCRD: CPT | Mod: CPTII,NTX,S$GLB, | Performed by: INTERNAL MEDICINE

## 2024-01-29 PROCEDURE — 3288F FALL RISK ASSESSMENT DOCD: CPT | Mod: CPTII,NTX,S$GLB, | Performed by: INTERNAL MEDICINE

## 2024-01-29 PROCEDURE — 94618 PULMONARY STRESS TESTING: CPT | Mod: NTX,S$GLB,, | Performed by: INTERNAL MEDICINE

## 2024-01-29 NOTE — TELEPHONE ENCOUNTER
Called patient to discuss scheduling a rheumatology appointment based on the new referral ordered today by Ximena Gonzalez PA-C. The patient stated he is already established with a rheumatologist at [facility] and he has an appointment with that provider tomorrow. Informed patient that I will update MAYCOL Gonzalez. The patient denied having any needs or questions at this time.   Message forwarded to Ximena Gonzalez PA-C.

## 2024-01-29 NOTE — LETTER
January 29, 2024        Camelia Simon  [address]  [facility] [address]  Phone: [phone]  Fax: [phone]             [facility] 1st Fl  [address]  [facility] [address]  Phone: [phone]   Patient: Catrachito Kraft   MR Number: [id number]   YOB: 1955   Date of Visit: 1/29/2024       Dear Dr. Camelia Simon    Thank you for referring Catrachito Kraft to me for evaluation. Attached you will find relevant portions of my assessment and plan of care.    If you have questions, please do not hesitate to call me. I look forward to following Catrachito Kraft along with you.    Sincerely,    Jigna Gonzales, DO    Enclosure    If you would like to receive this communication electronically, please contact [email or web address] or [phone] to request [employer] Link access.    [employer] Link is a tool which provides read-only access to select patient information with whom you have a relationship. Its easy to use and provides real time access to review your patients record including encounter summaries, notes, results, and demographic information.    If you feel you have received this communication in error or would no longer like to receive these types of communications, please e-mail [email or web address]

## 2024-01-29 NOTE — PROGRESS NOTES
ADVANCED LUNG DISEASE INITIAL EVALUATION                                                                                                                                             Reason for Visit:  Evaluation for CPFE    Referring Physician: Camelia Simon MD    History of Present Illness: Catrachito Kraft is a 68 y.o. male who is on 4L of oxygen.  He is on no assisted ventilation.  His New [place] Heart Association Class is III and a Karnofsky score of 70% - Cares for self: Unable to carry on normal activity or active work. He is not diabetic.    Requires Supplemental O2: Yes.  With exercise: Nasal cannula - 4 L/min.    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 Patient states symptoms first began in 2020 when he tested positive for RSV. Patient states he required 3 day hospital stay for progressive dyspnea, and received anti-virals, oxygen, and nebulizers while inpatient. He did not require oxygen upon discharge. He states he had progressive dyspnea following his hospitalization and was diagnosed with CPFE shortly after. He had an autoimmune workup completed at that time and found to have scleroderma (+JEANNA, elevated JEANNA titre, ESR) and has remained on MMF since. Now on MMF 1500 mg BID and tolerating well. He was also started on Trelegy inhaler and albuterol to be used prn. He was started on OFEV last week and is tolerating therapy thus far. He denies history of ICU admissions, intubations, previous chest surgeries or lung biopsies. His 3 day hospitalization for RSV was his only exacerbation to date.     Patient is a former smoker with a 62 pack year history and quit in 2005. He has a history of GERD which is well controlled on PPI and H2B therapy. He was diagnosed with prostate cancer  in 2019 and subsequently underwent radical protatectomy and 38 rounds of radiation. Since then, he has numerous episodes of melena, BRBPR, urinary incontinence/retention and UTIs. He states he was maintained on macrobid therapy for an entire year due to need for indwelling catheter and colonization. He states he used to cycle to stay active prior to 2019, but has been unable to do it due to pain. He takes CBD gummies to help with his chronic rectal/urinary pain. He will occasionally use narcotics for pain relief, but often has more bleeding with Bms due to straining. +CREST, as he notes raynauds, some skin tightening, and esophageal dysmotility associated with his scleroderma.     Patient states he was instructed to start oxygen to be used with exertion, but only just recently started using 4L. He states his dyspnea was slowly progressive after his initial diagnosis, but has worsening rapidly over the last few months. He now requires frequent breaks while performing his ADLs. He has good days and bad days, and was only using oxygen when he was feeling very dsypneic. He has to stop for breaks after walking 25 feet. He admits to a more sedentary lifestyle since receiving his diagnosis and states he is scared to become short of breath. Patient presents to the clinic today with his wife. He currently lives in [place]. He previously worked offshore, as a , and has a history of occupational exposures. Not enrolled in pulmonary rehab.     Past Medical History:   Diagnosis Date    Abdominal aortic aneurysm without rupture     Abdominal pain 2020    Asthma     Colon polyps     Elevated cholesterol     GERD (gastroesophageal reflux disease)     Hematochezia     Hiatal hernia     History of radiation therapy     Hypercholesteremia     Hypertension     PONV (postoperative nausea and vomiting)     x1    Presence of indwelling urinary catheter 11/17/2020    Proctitis, radiation     Prostate cancer 2019    radiation     Rectal bleeding     Rectal bleeding 2020    due to radiation treatment for prostate cancer,  having laser procedures to treat     Reflux esophagitis     Renal disorder     uti    Systemic sclerosis     Urinary incontinence     Vertigo     Wears glasses        Past Surgical History:   Procedure Laterality Date    APPENDECTOMY      COLONOSCOPY N/A 12/4/2017    Procedure: HIGH RISK SCREENING COLONOSCOPY;  Surgeon: Lorne Hair MD;  Location: [facility] ENDO;  Service: Endoscopy;  Laterality: N/A;    COLONOSCOPY N/A 1/24/2020    Procedure: COLONOSCOPY;  Surgeon: Lorne Hair MD;  Location: [facility] ENDO;  Service: Endoscopy;  Laterality: N/A;    COLONOSCOPY N/A 9/21/2023    Procedure: COLONOSCOPY;  Surgeon: Lorne Hair MD;  Location: [facility] ENDO;  Service: Endoscopy;  Laterality: N/A;    CYSTOSCOPY  12/14/2020    Procedure: CYSTOSCOPY;  Surgeon: Aki El MD;  Location: [facility];  Service: Urology;;    CYSTOSCOPY W/ RETROGRADES Bilateral 3/11/2020    Procedure: CYSTOSCOPY WITH RETROGRADE PYELOGRAM;  Surgeon: Aki El MD;  Location: [facility];  Service: Urology;  Laterality: Bilateral;    CYSTOSCOPY W/ RETROGRADES Bilateral 6/16/2022    Procedure: CYSTOSCOPY WITH RETROGRADE PYELOGRAM;  Surgeon: Aki El MD;  Location: [facility];  Service: Urology;  Laterality: Bilateral;    DILATION OF URETHRA N/A 3/11/2020    Procedure: DILATION, URETHRAL STRICTURE;  Surgeon: Aki El MD;  Location: [facility] OR;  Service: Urology;  Laterality: N/A;    DILATION OF URETHRA  11/20/2020    Procedure: DILATION, URETHRA;  Surgeon: Aki El MD;  Location: [facility] OR;  Service: Urology;;    EYE SURGERY Bilateral     cataract    FLEXIBLE SIGMOIDOSCOPY N/A 2/14/2020    Procedure: SIGMOIDOSCOPY, FLEXIBLE/apc;  Surgeon: Amol Telles MD;  Location: [facility] ([address] [place] [address]);  Service: Endoscopy;  Laterality: N/A;  out-pt blood work 2/4/20-tb    FLEXIBLE SIGMOIDOSCOPY N/A 6/26/2020    Procedure: SIGMOIDOSCOPY,  FLEXIBLE/apc;  Surgeon: Amol Telles MD;  Location: [facility] (2ND FLR);  Service: Endoscopy;  Laterality: N/A;  Covid-19 test 6/24/20 at [facility] - pg    FLEXIBLE SIGMOIDOSCOPY N/A 8/12/2020    Procedure: SIGMOIDOSCOPY, FLEXIBLE;  Surgeon: Amol Telles MD;  Location: [facility] ENDO (2ND FLR);  Service: Endoscopy;  Laterality: N/A;  Covid-19 test 8/9/20 at [facility] - pg    PROSTATE BIOPSY      RADICAL RETROPUBIC PROSTATECTOMY N/A 4/8/2019    Procedure: PROSTATECTOMY-RADICAL-RETROPUBIC using Harmonic scalpel;  Surgeon: Aki El MD;  Location: [facility] OR;  Service: Urology;  Laterality: N/A;       Allergies: Adhesive tape-silicones    Current Outpatient Medications   Medication Sig    albuterol (PROVENTIL/VENTOLIN HFA) 90 mcg/actuation inhaler Inhale 2 puffs into the lungs every 6 (six) hours as needed.    aspirin (ECOTRIN) 81 MG EC tablet Take 81 mg by mouth once daily.    atorvastatin (LIPITOR) 40 MG tablet Take 40 mg by mouth every evening.     cholecalciferol, vitamin D3, 125 mcg (5,000 unit) Tab Vitamin D3 125 mcg (5,000 unit) tablet, [RxNorm: 811768]    ciprofloxacin HCl (CIPRO) 500 MG tablet Take 500 mg by mouth once daily.    cyclobenzaprine (FLEXERIL) 10 MG tablet Take 10 mg by mouth 3 (three) times daily as needed for Muscle spasms.    ergocalciferol (ERGOCALCIFEROL) 50,000 unit Cap Take 50,000 Units by mouth every 7 days.    esomeprazole (NEXIUM) 40 MG capsule Take 40 mg by mouth 2 (two) times daily before meals.    fluorouraciL (EFUDEX) 5 % cream APPLY 1 APPLICATION ON THE SKIN NIGHTLY FOR 1 - 2 WEEKS.    fluticasone-umeclidin-vilanter (TRELEGY ELLIPTA) 100-62.5-25 mcg DsDv Inhale 1 puff into the lungs once daily.    HYDROcodone-acetaminophen (NORCO) 5-325 mg per tablet Take 1 tablet by mouth every 6 (six) hours as needed for Pain.    hydrocortisone (CORTEF) 5 MG Tab Take 5 mg by mouth as needed.    LIDOCAINE 2 %, VALIUM 5 MG, BACLOFEN 4 % SUPPOSITORY Place 1  suppository rectally 2 (two) times daily.    LINZESS 145 mcg Cap capsule Take 145 mcg by mouth.    meclizine (ANTIVERT) 25 mg tablet Take 1 tablet (25 mg total) by mouth 3 (three) times daily as needed. (Patient taking differently: Take 25 mg by mouth as needed.)    mycophenolate (CELLCEPT) 500 mg Tab Take 1,000 mg by mouth 2 (two) times daily.    NIFEdipine (ADALAT CC) 30 MG TbSR Take 30 mg by mouth once daily.    nintedanib (OFEV) 100 mg Cap Take 1 tablet by mouth once daily.    nystatin (MYCOSTATIN) 100,000 unit/mL suspension Take 5 mLs by mouth 4 (four) times daily.    oxybutynin (DITROPAN) 5 MG Tab Take 5 mg by mouth daily as needed.    penicillin v potassium (VEETID) 500 MG tablet penicillin V potassium 500 mg tablet, [RxNorm: 501644]     No current facility-administered medications for this visit.     Facility-Administered Medications Ordered in Other Visits   Medication    0.9%  NaCl infusion       Immunization History   Administered Date(s) Administered    COVID-19, MRNA, LN-S, PF (Pfizer) (Purple Cap) 03/16/2021, 04/06/2021    Influenza - Quadrivalent - High Dose - PF (65 years and older) 10/27/2022, 10/14/2023    RSVpreF (Arexvy) 10/14/2023     Family History:    Family History   Problem Relation Age of Onset    Heart disease Mother     COPD Mother     Hyperlipidemia Mother     Kidney disease Mother     Heart disease Father     Kidney disease Father     Cancer Father         prostate    Colon polyps Father     Prostate cancer Father     Bladder Cancer Father      Social History     Substance and Sexual Activity   Alcohol Use Yes    Comment: once or twice a year      Social History     Substance and Sexual Activity   Drug Use Yes    Types: Marijuana    Comment: Marijuana gummies - not everyday      Social History     Socioeconomic History    Marital status:    Tobacco Use    Smoking status: Former     Current packs/day: 0.00     Average packs/day: 2.5 packs/day for 25.0 years (62.5 ttl pk-yrs)      Types: Cigarettes     Start date:      Quit date: 2005     Years since quittin.0     Passive exposure: Past    Smokeless tobacco: Never   Substance and Sexual Activity    Alcohol use: Yes     Comment: once or twice a year    Drug use: Yes     Types: Marijuana     Comment: Marijuana gummies - not everyday     Social Determinants of Health     Financial Resource Strain: Low Risk  (2023)    Overall Financial Resource Strain (CARDIA)     Difficulty of Paying Living Expenses: Not very hard   Food Insecurity: Patient Declined (2023)    Hunger Vital Sign     Worried About Running Out of Food in the Last Year: Patient declined     Ran Out of Food in the Last Year: Patient declined   Transportation Needs: No Transportation Needs (2023)    PRAPARE - Transportation     Lack of Transportation (Medical): No     Lack of Transportation (Non-Medical): No   Physical Activity: Insufficiently Active (2023)    Exercise Vital Sign     Days of Exercise per Week: 2 days     Minutes of Exercise per Session: 10 min   Stress: Stress Concern Present (2023)    [employer] - Occupational Stress Questionnaire     Feeling of Stress : Very much   Social Connections: Unknown (2023)    Social Connection and Isolation Panel [NHANES]     Frequency of Communication with Friends and Family: Three times a week     Frequency of Social Gatherings with Friends and Family: Once a week     Active Member of Clubs or Organizations: No     Attends Club or Organization Meetings: Never     Marital Status:    Housing Stability: Low Risk  (2023)    Housing Stability Vital Sign     Unable to Pay for Housing in the Last Year: No     Number of Places Lived in the Last Year: 1     Unstable Housing in the Last Year: No     ROS  Vitals  /85 (BP Location: Right arm, Patient Position: Sitting, BP Method: Medium (Automatic))   Pulse 81   Temp 98.2 °F (36.8 °C) (Oral)   Resp 16    "Ht 5' 6" (1.676 m)   Wt 71.7 kg (158 lb 1.1 oz)   SpO2 95% Comment: Room Air  BMI 25.51 kg/m²   Physical Exam    Labs:  No visits with results within 7 Day(s) from this visit.   Latest known visit with results is:   Lab Visit on 01/09/2024   Component Date Value    WBC 01/09/2024 11.40     RBC 01/09/2024 4.71     Hemoglobin 01/09/2024 14.1     Hematocrit 01/09/2024 42.9     MCV 01/09/2024 91     MCH 01/09/2024 30.0     MCHC 01/09/2024 32.9     RDW 01/09/2024 14.5     Platelets 01/09/2024 230     MPV 01/09/2024 9.6     Gran # (ANC) 01/09/2024 9.8 (H)     Lymph # 01/09/2024 0.6 (L)     Mono # 01/09/2024 0.8     Eos # 01/09/2024 0.1     Baso # 01/09/2024 0.00     nRBC 01/09/2024 0     Gran % 01/09/2024 86.2 (H)     Lymph % 01/09/2024 5.2 (L)     Mono % 01/09/2024 7.0     Eosinophil % 01/09/2024 1.2     Basophil % 01/09/2024 0.4     Differential Method 01/09/2024 Automated     Sodium 01/09/2024 139     Potassium 01/09/2024 4.2     Chloride 01/09/2024 105     CO2 01/09/2024 25     Glucose 01/09/2024 88     BUN 01/09/2024 15     Creatinine 01/09/2024 1.14     Calcium 01/09/2024 9.9     Total Protein 01/09/2024 7.5     Albumin 01/09/2024 4.7     Total Bilirubin 01/09/2024 1.1     Alkaline Phosphatase 01/09/2024 156 (H)     AST 01/09/2024 32     ALT 01/09/2024 21     Anion Gap 01/09/2024 9     eGFR 01/09/2024 >60     Magnesium 01/09/2024 1.8     NT-proBNP 01/09/2024 509.0     Sed Rate 01/09/2024 14     CRP 01/09/2024 <5.0            1/29/2024     1:32 PM   Pulmonary Function Tests   FVC 3.89 liters   FEV1 3.25 liters   TLC (liters) 5 liters   DLCO (ml/mmHg sec) 6.02 ml/mmHg sec   FVC% 103   FEV1% 111.9   FEF 25-75 4.46   FEF 25-75% 191.1   TLC% 79.3   RV 1.12   RV% 45.8   DLCO% 24.7         1/29/2024    11:37 AM 11/8/2023     2:33 PM 12/5/2022     2:16 PM 6/21/2022     3:47 PM   6MW   6MWT Status completed without stopping completed without stopping completed without stopping completed with stops   Patient Reported " Dyspnea;Lightheadedness Dyspnea Calf pain;Dizziness;Dyspnea;Leg pain;Lightheadedness;Other (Comment) Dyspnea;Lightheadedness   Was O2 used? Yes Yes No Yes   Delivery Method Cannula;Pull Tank;Continuous Flow Demand Flow  Demand Flow;Cannula   6MW Distance walked (feet) 1250 feet 1550 feet 1100 feet 700 feet   Distance walked (meters) 381 meters 472.44 meters 335.28 meters 213.36 meters   Did patient stop? No No No Yes   Oxygen Saturation 99 % 95 % 91 % 97 %   Supplemental Oxygen 4 L/M Room Air Room Air Room Air   Heart Rate 85 bpm 75 bpm 91 bpm 93 bpm   Blood Pressure 123/81 127/78 134/94 129/67   Tanvi Dyspnea Rating  very light light nothing at all very light   Oxygen Saturation 87 % 93 % 93 % 97 %   Supplemental Oxygen 4 L/M 4 L/M Room Air 3 L/M   Heart Rate 128 bpm 102 bpm 128 bpm 106 bpm   Blood Pressure 135/86 130/75 139/83 124/68   Tanvi Dyspnea Rating  heavy heavy moderate heavy   Recovery Time (seconds) 92 seconds 240 seconds 240 seconds    Oxygen Saturation 98 % 97 % 98 % 98 %   Supplemental Oxygen 4 L/M Room Air Room Air Room Air   Heart Rate 92 bpm 83 bpm 102 bpm 93 bpm       Imaging:  Results for orders placed during the hospital encounter of 01/09/24    X-Ray Chest PA And Lateral    Narrative  EXAMINATION:  XR CHEST PA AND LATERAL    CLINICAL HISTORY:  Other forms of dyspnea    TECHNIQUE:  Chest 2 views    COMPARISON:  10/16/2023    FINDINGS:  Stable cardiomediastinal contours.  Unremarkable pulmonary vasculature.  Hypoinflation of the lungs.  Interstitial prominence in the pulmonary periphery intervally increased at the right mid to lower lung zone since the prior exam associated with linear opacities.  No acute osseous change.    Impression  Peripheral interstitial prominence appears to represent a chronic finding and suggests a component of chronic interstitial lung disease.  There has been interval increased focal peripheral opacity at the right mid to lower lung zone associated with linear opacities  which may indicate progressed focal fibrotic change/scarring, atelectasis, or pneumonitis.      Electronically signed by: Hermilo Odell MD  Date:    01/09/2024  Time:    09:24          Cardiodiagnostics:  Results for orders placed during the hospital encounter of 11/14/23    2D echo with color flow doppler and bubble contrast    Narrative  Transthoracic Echocardiogram Report    Patient Name  : [place] [address]    Phone: [phone]    Interpreting Physician: UMANG MCKEON MD  Demographics:  Name:  DENISHA CRUZ   YOB: 1955  Age/Sex:  68, Male   Height: 5 ft 7 in  Weight:   160 pounds    BSA: 1.86 cc/m?  BMI:      25.1   Date of Study: 11/14/2023  Medical Record No:   [id number]   Location: OP  Referring Physician:   MARTHA CONNER   Technologist: Christy Kelly  Study:   Trans Thoracic Echocardiogram  Study Quality:   Good  Procedure  Type: Adult TTE (Date Study Ordered : 11/14/2023)  Components: 2D,Color Flow Doppler,Doppler,M-mode,TDI(Tissue Doppler  Imaging),Bubble  Referral diagnosis  ANN  Hemodynamics  Heart rate: 80 beats/min  Blood pressure:  112/77 mmHg  ECG:    Final Impressions  1. The study quality is good.  2. Global left ventricular systolic function is normal. The left  ventricular ejection fraction is 55-60%.  3. A bubble study was performed to rule out patent foramen ovale. The  bubble study was negative, ruling out patent foramen ovale.  4. Mild calcification of the mitral valve is noted. Mild (1+) mitral  regurgitation.  5. Trace aortic regurgitation. Trace tricuspid regurgitation. Trace  pulmonic regurgitation.  6. The pulmonary artery systolic pressure is 29 mmHg.    Intra-modality comparison (Echocardiogram)  This echocardiogram when compared with the latest echocardiogram-TTE  ([facility]) dated 12/6/2022 shows:  1. Ejection fraction essentially remained unchanged (55% previous  study, 68% current study).  2.  Mitral valve area by pressure halftime has decreased from 4.9 cm?  to 3.7 cm?.    Findings    Left Atrium  The left atrium is normal in size. Left atrial diameter is 3.4 cms.    Right Atrium  The right atrium is normal in size. Right atrial diameter is 3.3 cms.    Left Ventricle  The left ventricle is normal in size. Left ventricular diastolic  dimension is 4.3 cms. Left ventricular systolic dimension is 2.7 cms.  Left ventricular diastolic septal thickness is 1 cm. Left ventricular  diastolic postero basal free wall thickness is 1.2 cms. Global left  ventricular systolic function is normal. The left ventricular  fractional shortening is 37.2%. The left ventricular ejection fraction  is 68%. Left ventricular diastolic function is abnormal (stage I  impaired relaxation). Left ventricular outflow tract diameter is 2.1  cms. Left ventricular outflow tract VTI is 16.8 cms. Left ventricular  outflow tract mean velocity is 0.6 m/s. Left ventricular mean gradient  is 2 mmHg.    Right Ventricle  Right ventricular diastolic dimension is 3.8 cms. Right ventricular  systolic pressure is 29 mmHg.    Atrial Septum  A bubble study was performed to rule out patent foramen ovale. A four  chamber image was obtained and a bolus of agitated saline was injected  into an antecubital vein. Opacification of the right heart chambers  was visualized. No bubbles were visualized in the left heart chambers  at rest or with valsalva. The bubble study was negative, ruling out  patent foramen ovale.    Ventricular Septum  The ventricular septum is normal.    Pulmonary Artery  The pulmonary artery systolic pressure is 29 mmHg.    Pulmonary Vein  The pulmonary vein appears normal.    IVC  The inferior vena cava is normal.    Pulmonic Valve  Evidence of pulmonic regurgitation is noted. Trace pulmonic  regurgitation. The peak velocity is 0.8 m/s. The mean velocity is 0.6  m/s. The peak trans pulmonic gradient is 2 mmHg. The mean trans  pulmonic  gradient is 2 mmHg.    Tricuspid Valve  Evidence of tricuspid regurgitation is present. Trace tricuspid  regurgitation. The peak tricuspid regurgitant velocity is 2.2 m/s. The  peak trans tricuspid gradient is 19 mmHg.    Mitral Valve  Mild calcification of the mitral valve is noted. Mitral regurgitation  is noted. Mild (1+) mitral regurgitation. The pressure half time is 60  ms. The deceleration time is 243 ms. The E velocity is 0.3 m/s. The A  velocity is 0.7 m/s.    Aortic Valve  The aortic valve is tricuspid. Noted evidence of aortic regurgitation.  Trace aortic regurgitation. The trans-aortic peak velocity is 1.6 m/s.  The trans-aortic peak gradient is 10.9 mmHg. The trans-aortic mean  velocity is 1.1 m/s. The trans-aortic mean gradient is 6 mmHg. Aortic  valve VTI measures 29.3 cms. Mild calcification of the aortic valve is  noted with adequate cuspal excursion.  LVOT Diameter is 2.1 cms.    Aorta  Aortic root diameter is 3.3 cms. Ascending aorta diameter is 3.6 cms.  The aortic root appears normal.    Pericardium  The pericardium is normal in appearance.    Measurements  Left Atrium  Diameter   3.4cm(s)  Volume   28ml  Volume Index   15.1ml/m?    Right Atrium  Diameter   3.3cm(s)    Left Ventricle  End Diastolic Dimension   4.3cm(s)  End Systolic Dimension   2.7cm(s)  Posterior Basal Free Wall Thickness   1.2cm(s)  End Diastolic Septal Thickness   1cm(s)  Ejection Fraction   68%  Fractional Shortening   37.2    Right Ventricle  Diastolic Diameter   3.8cm(s)    Pulmonic Valve  Peak Pulmonic Velocity   0.8m/s  Mean Pulmonic Velocity   0.6m/s  Peak Trans Pulmonic Gradient   2mmHg  Mean Trans Pulmonic Gradient   2mmHg    Tricuspid Valve  Peak Tricuspid Regurgitant Velocity   2.2m/s  Peak Tricuspid Regurgitant Gradient   19mmHg  Pulmonary Artery Systolic Pressure  29mmHg    Mitral Valve  Pressure Half Time   60ms  Deceleration Time   243ms  A Velocity   0.7m/s  E Velocity   0.3m/s  Area By Pressure Half Time    3.7cm?    Aortic Valve  Trans Aortic Peak Velocity   1.6m/s  Trans Aortic Mean Velocity   1.1m/s  Trans Aortic Peak Gradient   10.9mmHg  Trans Aortic Mean Gradient   6mmHg  Aortic Valve VTI   29.3cm(s)  LVOT Diameter   2.1cm(s)        Electronically Authenticated by  UMANG MCKEON MD  11/14/2023 , 14:58    Results for orders placed or performed in visit on 01/10/24 (from the past 2160 hour(s))   CT Chest Without Contrast    Narrative    EXAMINATION:  CT CHEST WITHOUT CONTRAST    CLINICAL HISTORY:  High resolution CT please; ILD, systemic sclerosis- progression?;  Interstitial lung disease with progressive fibrotic phenotype in diseases classified elsewhere    TECHNIQUE:  Iterative reconstruction technique was used.    CT/cardiac nuclear exam/s in prior 12 months:  2.    COMPARISON:  Chest CT 06/09/2023.    FINDINGS:  Stable mildly prominent mediastinal lymph nodes.  Stable 7 mm ovoid right lower lobe nodule (axial 65).  Stable 1.3 cm somewhat lobular left lower lobe nodule (axial 65).  No new suspicious nodule detected.  Stable septal thickening, honeycombing, and bronchiectasis with a lower lobe predominance, consistent with UIP.  Severe emphysema.  No pleural or pericardial fluid.  Dilated fluid-filled esophagus consistent with scleroderma.  Upper abdominal images demonstrate bilateral renal cysts.      Impression    1. Stable UIP and severe emphysema.  2. Stable bilateral lower lobe nodules.  3. Dilated fluid-filled esophagus consistent with scleroderma.      Electronically signed by: Amol Menard MD  Date:    01/10/2024  Time:    11:44    chest      Assessment:  1. Chronic respiratory failure with hypoxia, on home O2 therapy    2. Systemic sclerosis with lung involvement    3. Combined pulmonary fibrosis and emphysema (CPFE)      Plan:     Patient referred for CPFE, with history of scleroderma. CT chest with diffuse mosaicism, bibasilar predominant fibrosis, traction bronchiectasis and some peripheral  honeycombing. Mild restriction with severely diminished DLCO. Started on OFEV and tolerating well. Continue current therapy.     Will place referral for rheumatology evaluation of scleroderma with +CREST. Continue MMF 1500 mg BID. If he continues to progress, consider addition of Actemra vs rituximab.     Discussed the importance of compliance with oxygen supplementation. Recommend 4L with exertion.     RTC in 3 months or sooner if needed. Repeat PFTs and 6MWT at each visit.       Ximena Gonzalez PA-C  [facility]

## 2024-01-29 NOTE — PROCEDURES
Catrachito Kraft is a 68 y.o.  male patient, who presents for a 6 minute walk test ordered by DO Christian.  The diagnosis is Interstitial Lung Disease; Connective Tissue Disease.  The patient's BMI is 25.3 kg/m2.  Predicted distance (lower limit of normal) is 380.09 meters.      Test Results:    The test was completed without stopping. The total time walked was 360 seconds. During walking, the patient reported:  Dyspnea, Lightheadedness. The patient used supplemental oxygen during testing. Oxygen saturation was measured using forehead pulse oximetry probe.    01/29/2024---------Distance: 381 meters (1250 feet)     Lap Walk Time O2 Sat % Supplemental Oxygen Heart Rate Blood Pressure Tanvi Scale Comment   Pre-exercise  (Resting) 0 0 99 % 4 L/M 85 bpm 123/81 mmHg 1 Forehead pulse oximetry probe.   End of Exercise  360 sec 87 % 4 L/M 128 bpm 135/86 mmHg 5-6    Post-exercise  (Recovery)   98 % 4 L/M  92 bpm        Recovery Time: 92 seconds    Performing nurse/tech: Estop[employer] RRT      PREVIOUS STUDY on 11/08/2023 at [facility]:  472 meters (1550 feet).      CLINICAL INTERPRETATION:  Six minute walk distance is 381 meters (1250 feet) with heavy dyspnea.  During exercise, there was significant desaturation while breathing supplemental oxygen.  Blood pressure remained stable and Heart rate increased significantly with walking.  The patient reported non-pulmonary symptoms during exercise.  Since the previous study in November 2023, exercise capacity is significantly worse.  Based upon age and body mass index, exercise capacity is normal.

## 2024-02-06 ENCOUNTER — TELEPHONE (OUTPATIENT)
Dept: TRANSPLANT | Facility: CLINIC | Age: 69
End: 2024-02-06
Payer: COMMERCIAL

## 2024-02-06 DIAGNOSIS — J84.9 ILD (INTERSTITIAL LUNG DISEASE): Primary | ICD-10-CM

## 2024-02-06 DIAGNOSIS — Z51.81 THERAPEUTIC DRUG MONITORING: ICD-10-CM

## 2024-03-01 PROBLEM — R65.20 SEVERE SEPSIS: Status: ACTIVE | Noted: 2024-03-01

## 2024-03-01 PROBLEM — A41.9 SEVERE SEPSIS: Status: ACTIVE | Noted: 2024-03-01

## 2024-03-02 PROBLEM — N31.9 NEUROGENIC BLADDER: Status: ACTIVE | Noted: 2024-03-02

## 2024-03-02 PROBLEM — T83.511A UTI (URINARY TRACT INFECTION) DUE TO URINARY INDWELLING CATHETER: Status: ACTIVE | Noted: 2024-03-02

## 2024-03-02 PROBLEM — I71.43 INFRARENAL ABDOMINAL AORTIC ANEURYSM (AAA) WITHOUT RUPTURE: Status: ACTIVE | Noted: 2024-03-02

## 2024-03-02 PROBLEM — J96.11 CHRONIC RESPIRATORY FAILURE WITH HYPOXIA: Status: ACTIVE | Noted: 2024-03-02

## 2024-03-02 PROBLEM — N39.0 UTI (URINARY TRACT INFECTION) DUE TO URINARY INDWELLING CATHETER: Status: ACTIVE | Noted: 2024-03-02

## 2024-03-15 ENCOUNTER — TELEPHONE (OUTPATIENT)
Dept: TRANSPLANT | Facility: CLINIC | Age: 69
End: 2024-03-15
Payer: COMMERCIAL

## 2024-03-18 ENCOUNTER — LAB VISIT (OUTPATIENT)
Dept: LAB | Facility: HOSPITAL | Age: 69
End: 2024-03-18
Payer: COMMERCIAL

## 2024-03-18 ENCOUNTER — HOSPITAL ENCOUNTER (OUTPATIENT)
Dept: PULMONOLOGY | Facility: CLINIC | Age: 69
Discharge: HOME | End: 2024-03-18
Payer: COMMERCIAL

## 2024-03-18 ENCOUNTER — OFFICE VISIT (OUTPATIENT)
Dept: TRANSPLANT | Facility: CLINIC | Age: 69
End: 2024-03-18
Payer: COMMERCIAL

## 2024-03-18 VITALS
WEIGHT: 155.19 LBS | HEIGHT: 66 IN | HEIGHT: 66 IN | OXYGEN SATURATION: 97 % | HEART RATE: 93 BPM | RESPIRATION RATE: 16 BRPM | SYSTOLIC BLOOD PRESSURE: 118 MMHG | BODY MASS INDEX: 24.94 KG/M2 | WEIGHT: 155.19 LBS | TEMPERATURE: 98 F | BODY MASS INDEX: 24.94 KG/M2 | DIASTOLIC BLOOD PRESSURE: 79 MMHG

## 2024-03-18 DIAGNOSIS — J84.9 ILD (INTERSTITIAL LUNG DISEASE): ICD-10-CM

## 2024-03-18 DIAGNOSIS — Z51.81 THERAPEUTIC DRUG MONITORING: ICD-10-CM

## 2024-03-18 DIAGNOSIS — Z99.81 CHRONIC RESPIRATORY FAILURE WITH HYPOXIA, ON HOME O2 THERAPY: ICD-10-CM

## 2024-03-18 DIAGNOSIS — J96.11 CHRONIC RESPIRATORY FAILURE WITH HYPOXIA, ON HOME O2 THERAPY: ICD-10-CM

## 2024-03-18 DIAGNOSIS — J84.9 ILD (INTERSTITIAL LUNG DISEASE): Primary | ICD-10-CM

## 2024-03-18 DIAGNOSIS — J43.9 COMBINED PULMONARY FIBROSIS AND EMPHYSEMA (CPFE): ICD-10-CM

## 2024-03-18 DIAGNOSIS — J84.10 COMBINED PULMONARY FIBROSIS AND EMPHYSEMA (CPFE): ICD-10-CM

## 2024-03-18 DIAGNOSIS — M34.81 SYSTEMIC SCLEROSIS WITH LUNG INVOLVEMENT: ICD-10-CM

## 2024-03-18 LAB
ALBUMIN SERPL BCP-MCNC: 3.8 G/DL (ref 3.5–5.2)
ALP SERPL-CCNC: 172 U/L (ref 55–135)
ALT SERPL W/O P-5'-P-CCNC: 18 U/L (ref 10–44)
ANION GAP SERPL CALC-SCNC: 10 MMOL/L (ref 8–16)
AST SERPL-CCNC: 19 U/L (ref 10–40)
BASOPHILS # BLD AUTO: 0.04 K/UL (ref 0–0.2)
BASOPHILS NFR BLD: 0.5 % (ref 0–1.9)
BILIRUB SERPL-MCNC: 0.6 MG/DL (ref 0.1–1)
BUN SERPL-MCNC: 16 MG/DL (ref 8–23)
CALCIUM SERPL-MCNC: 9.4 MG/DL (ref 8.7–10.5)
CHLORIDE SERPL-SCNC: 108 MMOL/L (ref 95–110)
CO2 SERPL-SCNC: 22 MMOL/L (ref 23–29)
CREAT SERPL-MCNC: 1.1 MG/DL (ref 0.5–1.4)
DIFFERENTIAL METHOD BLD: ABNORMAL
EOSINOPHIL # BLD AUTO: 0.2 K/UL (ref 0–0.5)
EOSINOPHIL NFR BLD: 2.6 % (ref 0–8)
ERYTHROCYTE [DISTWIDTH] IN BLOOD BY AUTOMATED COUNT: 14.3 % (ref 11.5–14.5)
EST. GFR  (NO RACE VARIABLE): >60 ML/MIN/1.73 M^2
GLUCOSE SERPL-MCNC: 81 MG/DL (ref 70–110)
HCT VFR BLD AUTO: 44.6 % (ref 40–54)
HGB BLD-MCNC: 14.4 G/DL (ref 14–18)
IMM GRANULOCYTES # BLD AUTO: 0.07 K/UL (ref 0–0.04)
IMM GRANULOCYTES NFR BLD AUTO: 0.8 % (ref 0–0.5)
LYMPHOCYTES # BLD AUTO: 0.8 K/UL (ref 1–4.8)
LYMPHOCYTES NFR BLD: 9.8 % (ref 18–48)
MCH RBC QN AUTO: 29.4 PG (ref 27–31)
MCHC RBC AUTO-ENTMCNC: 32.3 G/DL (ref 32–36)
MCV RBC AUTO: 91 FL (ref 82–98)
MONOCYTES # BLD AUTO: 0.7 K/UL (ref 0.3–1)
MONOCYTES NFR BLD: 8 % (ref 4–15)
NEUTROPHILS # BLD AUTO: 6.6 K/UL (ref 1.8–7.7)
NEUTROPHILS NFR BLD: 78.3 % (ref 38–73)
NRBC BLD-RTO: 0 /100 WBC
PLATELET # BLD AUTO: 257 K/UL (ref 150–450)
PMV BLD AUTO: 12.2 FL (ref 9.2–12.9)
POTASSIUM SERPL-SCNC: 3.7 MMOL/L (ref 3.5–5.1)
PROT SERPL-MCNC: 6.9 G/DL (ref 6–8.4)
RBC # BLD AUTO: 4.89 M/UL (ref 4.6–6.2)
SODIUM SERPL-SCNC: 140 MMOL/L (ref 136–145)
WBC # BLD AUTO: 8.4 K/UL (ref 3.9–12.7)

## 2024-03-18 PROCEDURE — 94726 PLETHYSMOGRAPHY LUNG VOLUMES: CPT | Mod: NTX,S$GLB,, | Performed by: INTERNAL MEDICINE

## 2024-03-18 PROCEDURE — 1125F AMNT PAIN NOTED PAIN PRSNT: CPT | Mod: CPTII,NTX,S$GLB, | Performed by: INTERNAL MEDICINE

## 2024-03-18 PROCEDURE — 1159F MED LIST DOCD IN RCRD: CPT | Mod: CPTII,NTX,S$GLB, | Performed by: INTERNAL MEDICINE

## 2024-03-18 PROCEDURE — 99214 OFFICE O/P EST MOD 30 MIN: CPT | Mod: 25,NTX,S$GLB, | Performed by: INTERNAL MEDICINE

## 2024-03-18 PROCEDURE — 80053 COMPREHEN METABOLIC PANEL: CPT | Mod: TXP | Performed by: NURSE PRACTITIONER

## 2024-03-18 PROCEDURE — 3078F DIAST BP <80 MM HG: CPT | Mod: CPTII,NTX,S$GLB, | Performed by: INTERNAL MEDICINE

## 2024-03-18 PROCEDURE — 3074F SYST BP LT 130 MM HG: CPT | Mod: CPTII,NTX,S$GLB, | Performed by: INTERNAL MEDICINE

## 2024-03-18 PROCEDURE — 85025 COMPLETE CBC W/AUTO DIFF WBC: CPT | Mod: NTX | Performed by: NURSE PRACTITIONER

## 2024-03-18 PROCEDURE — 94010 BREATHING CAPACITY TEST: CPT | Mod: 59,NTX,S$GLB, | Performed by: INTERNAL MEDICINE

## 2024-03-18 PROCEDURE — 3008F BODY MASS INDEX DOCD: CPT | Mod: CPTII,NTX,S$GLB, | Performed by: INTERNAL MEDICINE

## 2024-03-18 PROCEDURE — 1101F PT FALLS ASSESS-DOCD LE1/YR: CPT | Mod: CPTII,NTX,S$GLB, | Performed by: INTERNAL MEDICINE

## 2024-03-18 PROCEDURE — 94729 DIFFUSING CAPACITY: CPT | Mod: NTX,S$GLB,, | Performed by: INTERNAL MEDICINE

## 2024-03-18 PROCEDURE — 36415 COLL VENOUS BLD VENIPUNCTURE: CPT | Mod: NTX | Performed by: NURSE PRACTITIONER

## 2024-03-18 PROCEDURE — 94618 PULMONARY STRESS TESTING: CPT | Mod: NTX,S$GLB,, | Performed by: INTERNAL MEDICINE

## 2024-03-18 PROCEDURE — 99999 PR PBB SHADOW E&M-EST. PATIENT-LVL III: CPT | Mod: PBBFAC,TXP,, | Performed by: INTERNAL MEDICINE

## 2024-03-18 PROCEDURE — 1160F RVW MEDS BY RX/DR IN RCRD: CPT | Mod: CPTII,NTX,S$GLB, | Performed by: INTERNAL MEDICINE

## 2024-03-18 PROCEDURE — 3288F FALL RISK ASSESSMENT DOCD: CPT | Mod: CPTII,NTX,S$GLB, | Performed by: INTERNAL MEDICINE

## 2024-03-18 NOTE — LETTER
March 20, 2024        Camelia Simon  [address]  [facility] [address]  Phone: [phone]  Fax: [phone]             [facility] - Trans[facility] 1st Fl  [address]  [facility] [address]  Phone: [phone]   Patient: Catrachito Kraft   MR Number: [id number]   YOB: 1955   Date of Visit: 3/18/2024       Dear Dr. Camelia Simon    Thank you for referring Catrachito Kraft to me for evaluation. Attached you will find relevant portions of my assessment and plan of care.    If you have questions, please do not hesitate to call me. I look forward to following Catrachito Kraft along with you.    Sincerely,    Jigna Gonzales, DO    Enclosure    If you would like to receive this communication electronically, please contact [email or web address] or [phone] to request [employer] access.    [employer] is a tool which provides read-only access to select patient information with whom you have a relationship. Its easy to use and provides real time access to review your patients record including encounter summaries, notes, results, and demographic information.    If you feel you have received this communication in error or would no longer like to receive these types of communications, please e-mail [email or web address]

## 2024-03-18 NOTE — PROCEDURES
Catrachito Kraft is a 68 y.o.  male patient, who presents for a 6 minute walk test ordered by SIS Rogers.  The diagnosis is Interstitial Lung Disease; Connective Tissue Disease.  The patient's BMI is 25.1 kg/m2.  Predicted distance (lower limit of normal) is 381.21 meters.      Test Results:    The test was completed without stopping. The total time walked was 360 seconds. During walking, the patient reported:  Lightheadedness. The patient used supplemental oxygen during testing. Oxygen saturation was measured with forehead pulse oximetry probe.     03/18/2024---------Distance: 396.24 meters (1300 feet)     O2 Sat % Supplemental Oxygen Heart Rate Blood Pressure Tanvi Scale Comments   Pre-exercise  (Resting) 98 % 4 L/M 87 bpm 119/79 mmHg 0 Forehead pulse oximetry probe used.   During Exercise 89 % 4 L/M 111 bpm 117/67 mmHg 3    Post-exercise  (Recovery) 97 % 4 L/M  97 bpm        Recovery Time: 70 seconds    Performing nurse/tech: EFFIE Herman      PREVIOUS STUDY:   01/29/2024---------Distance: 381 meters (1250 feet)       Lap Walk Time O2 Sat % Supplemental Oxygen Heart Rate Blood Pressure Tanvi Scale Comment   Pre-exercise  (Resting) 0 0 99 % 4 L/M 85 bpm 123/81 mmHg 1 Forehead pulse oximetry probe.   End of Exercise   360 sec 87 % 4 L/M 128 bpm 135/86 mmHg 5-6     Post-exercise  (Recovery)     98 % 4 L/M  92 bpm             CLINICAL INTERPRETATION:  Six minute walk distance is 396.24 meters (1300 feet) with moderate dyspnea.  During exercise, there was significant desaturation while breathing supplemental oxygen.  Blood pressure remained stable and Heart rate increased significantly with walking.  The patient reported non-pulmonary symptoms during exercise.  Since the previous study in January 2024, exercise capacity is unchanged.  Based upon age and body mass index, exercise capacity is normal.

## 2024-03-18 NOTE — PROGRESS NOTES
ADVANCED LUNG DISEASE INITIAL EVALUATION                                                                                                                                             Reason for Visit:  Evaluation for CPFE    Referring Physician: No ref. provider found    History of Present Illness: Catrachito Kraft is a 68 y.o. male who is on 4L of oxygen.  He is on no assisted ventilation.  His New York Heart Association Class is III and a Karnofsky score of 70% - Cares for self: Unable to carry on normal activity or active work. He is not diabetic.    Requires Supplemental O2: Yes.  With exercise: Nasal cannula - 4 L/min.    Massive Hemoptysis: 0 occurrences  (Enter the number of times in the last year)    Exacerbations: 0 occurrences  (Enter the number of times in the last year)    Microbiology Infections: No     Patient is a former smoker with a 62 pack year history and quit in 2005. He has a history of GERD which is well controlled on PPI and H2B therapy. He was diagnosed with prostate cancer in 2019 and subsequently underwent radical protatectomy and 38 rounds of radiation. Since then, he has numerous episodes of melena, BRBPR, urinary incontinence/retention and UTIs. He states he was maintained on macrobid therapy for an entire year due to need for indwelling catheter and colonization. He states he used to cycle to stay active prior to 2019, but has been unable to do it due to pain. He takes CBD gummies to help with his chronic rectal/urinary pain. He will occasionally use narcotics for pain relief, but often has more bleeding with Bms due to straining. +CREST, as he notes raynauds, some skin tightening, and esophageal dysmotility associated with his scleroderma.     Patient states he was instructed to start oxygen to be used with exertion, but only just recently started using 4L. He states his dyspnea was slowly progressive after his initial diagnosis, but has worsening rapidly over the last few months. He now  requires frequent breaks while performing his ADLs. He admits to a more sedentary lifestyle since receiving his diagnosis and states he is scared to become short of breath. Patient presents to the clinic today with his wife. He currently lives in [place]. He previously worked offshore, as a , and has a history of occupational exposures.     patient presents today for follow up on combined pulmonary fibrosis and emphysema.has remained on MMF since. Now on MMF 1500 mg BID, He as well on OFEV. Patient hospitalized two weekends ago for sepsis and UTI and currently with condom catheter and has follow up with urology   Patient reported that his breathing is progressively worse, his sats drop to 70's % when he exert himself and  to 90% with 4L oxygen. He feels his lung is wose, he tends to sit and not move as he feels winded and dizzy and he fears if he moves he collapse and die. Patient reported compliance with cellcept and ofev. Patient told by his rheumatologist that cellcept is doing what it suppose to do and no plan to change. Patient is being evaluated for cardiac etiology as well as PH. Patient last echo 11/2023 showed PASP of 29 and per wife he had recent echo and coronary CT at [place] and he will be scheduled for LHC and RHC for further evaluation .      Past Medical History:   Diagnosis Date    Abdominal aortic aneurysm without rupture     Abdominal pain 2020    Asthma     Colon polyps     COPD (chronic obstructive pulmonary disease)     Elevated cholesterol     GERD (gastroesophageal reflux disease)     Hematochezia     Hiatal hernia     History of radiation therapy     Hypercholesteremia     Hypertension     PONV (postoperative nausea and vomiting)     x1    Presence of indwelling urinary catheter 11/17/2020    Proctitis, radiation     Prostate cancer 2019    radiation    Rectal bleeding     Rectal bleeding 2020    due to radiation treatment for prostate cancer,  having laser procedures to treat      Reflux esophagitis     Renal disorder     uti    Systemic sclerosis     Urinary incontinence     Vertigo     Wears glasses        Past Surgical History:   Procedure Laterality Date    APPENDECTOMY      COLONOSCOPY N/A 12/4/2017    Procedure: HIGH RISK SCREENING COLONOSCOPY;  Surgeon: Lorne Hair MD;  Location: [facility] ENDO;  Service: Endoscopy;  Laterality: N/A;    COLONOSCOPY N/A 1/24/2020    Procedure: COLONOSCOPY;  Surgeon: Lorne Hair MD;  Location: [facility] ENDO;  Service: Endoscopy;  Laterality: N/A;    COLONOSCOPY N/A 9/21/2023    Procedure: COLONOSCOPY;  Surgeon: Lorne Hair MD;  Location: [facility] ENDO;  Service: Endoscopy;  Laterality: N/A;    CYSTOSCOPY  12/14/2020    Procedure: CYSTOSCOPY;  Surgeon: Aki El MD;  Location: [facility] OR;  Service: Urology;;    CYSTOSCOPY W/ RETROGRADES Bilateral 3/11/2020    Procedure: CYSTOSCOPY WITH RETROGRADE PYELOGRAM;  Surgeon: Aki El MD;  Location: [facility] OR;  Service: Urology;  Laterality: Bilateral;    CYSTOSCOPY W/ RETROGRADES Bilateral 6/16/2022    Procedure: CYSTOSCOPY WITH RETROGRADE PYELOGRAM;  Surgeon: Aki El MD;  Location: [facility] OR;  Service: Urology;  Laterality: Bilateral;    DILATION OF URETHRA N/A 3/11/2020    Procedure: DILATION, URETHRAL STRICTURE;  Surgeon: Aki El MD;  Location: [facility] OR;  Service: Urology;  Laterality: N/A;    DILATION OF URETHRA  11/20/2020    Procedure: DILATION, URETHRA;  Surgeon: Aki El MD;  Location: [facility] OR;  Service: Urology;;    EYE SURGERY Bilateral     cataract    FLEXIBLE SIGMOIDOSCOPY N/A 2/14/2020    Procedure: SIGMOIDOSCOPY, FLEXIBLE/apc;  Surgeon: Amol Telles MD;  Location: [facility] ENDO (2ND FLR);  Service: Endoscopy;  Laterality: N/A;  out-pt blood work 2/4/20-tb    FLEXIBLE SIGMOIDOSCOPY N/A 6/26/2020    Procedure: SIGMOIDOSCOPY, FLEXIBLE/apc;  Surgeon: Amol Telles MD;  Location: [facility] ENDO (2ND FLR);  Service: Endoscopy;  Laterality: N/A;  Covid-19  test 6/24/20 at [facility] - pg    FLEXIBLE SIGMOIDOSCOPY N/A 8/12/2020    Procedure: SIGMOIDOSCOPY, FLEXIBLE;  Surgeon: Amol Telles MD;  Location: [address]);  Service: Endoscopy;  Laterality: N/A;  Covid-19 test 8/9/20 at [facility] - pg    PROSTATE BIOPSY      RADICAL RETROPUBIC PROSTATECTOMY N/A 4/8/2019    Procedure: PROSTATECTOMY-RADICAL-RETROPUBIC using Harmonic scalpel;  Surgeon: Aki El MD;  Location: [facility] OR;  Service: Urology;  Laterality: N/A;       Allergies: Adhesive tape-silicones    Current Outpatient Medications   Medication Sig    albuterol (PROVENTIL/VENTOLIN HFA) 90 mcg/actuation inhaler Inhale 2 puffs into the lungs every 6 (six) hours as needed for Shortness of Breath or Wheezing.    aspirin (ECOTRIN) 81 MG EC tablet Take 81 mg by mouth once daily.    atorvastatin (LIPITOR) 40 MG tablet Take 40 mg by mouth every evening.     cholecalciferol, vitamin D3, 125 mcg (5,000 unit) Tab Vitamin D3 125 mcg (5,000 unit) tablet, [RxNorm: 688827]    ciprofloxacin HCl (CIPRO) 500 MG tablet Take 1 tablet (500 mg total) by mouth every evening.    cyclobenzaprine (FLEXERIL) 10 MG tablet Take 10 mg by mouth 3 (three) times daily as needed for Muscle spasms.    ergocalciferol (ERGOCALCIFEROL) 50,000 unit Cap Take 50,000 Units by mouth every 7 days.    esomeprazole (NEXIUM) 40 MG capsule Take 40 mg by mouth 2 (two) times daily before meals.    fluorouraciL (EFUDEX) 5 % cream APPLY 1 APPLICATION ON THE SKIN NIGHTLY FOR 1 - 2 WEEKS.    fluticasone-umeclidin-vilanter (TRELEGY ELLIPTA) 100-62.5-25 mcg DsDv Inhale 1 puff into the lungs once daily.    HYDROcodone-acetaminophen (NORCO) 5-325 mg per tablet Take 1 tablet by mouth every 6 (six) hours as needed for Pain.    hydrocortisone (CORTEF) 5 MG Tab Take 5 mg by mouth as needed.    LINZESS 145 mcg Cap capsule Take 145 mcg by mouth.    meclizine (ANTIVERT) 25 mg tablet Take 1 tablet (25 mg total) by mouth 3 (three)  times daily as needed.    mycophenolate (CELLCEPT) 500 mg Tab Take 1,000 mg by mouth 2 (two) times daily.    NIFEdipine (ADALAT CC) 30 MG TbSR Take 30 mg by mouth once daily.    nintedanib (OFEV) 100 mg Cap Take 1 tablet by mouth 2 (two) times a day.    nystatin (MYCOSTATIN) 100,000 unit/mL suspension Take 5 mLs by mouth 4 (four) times daily.    oxybutynin (DITROPAN) 5 MG Tab Take 5 mg by mouth daily as needed.    polyethylene glycol (GLYCOLAX) 17 gram PwPk Take 17 g by mouth once daily.     No current facility-administered medications for this visit.     Facility-Administered Medications Ordered in Other Visits   Medication    0.9%  NaCl infusion       Immunization History   Administered Date(s) Administered    COVID-19, MRNA, LN-S, PF (Pfizer) (Purple Cap) 2021, 2021    Influenza - Quadrivalent - High Dose - PF (65 years and older) 10/27/2022, 10/14/2023    RSVpreF (Arexvy) 10/14/2023     Family History:    Family History   Problem Relation Age of Onset    Heart disease Mother     COPD Mother     Hyperlipidemia Mother     Kidney disease Mother     Heart disease Father     Kidney disease Father     Cancer Father         prostate    Colon polyps Father     Prostate cancer Father     Bladder Cancer Father      Social History     Substance and Sexual Activity   Alcohol Use Yes    Comment: once or twice a year      Social History     Substance and Sexual Activity   Drug Use Yes    Types: Marijuana    Comment: Marijuana gummies - not everyday      Social History     Socioeconomic History    Marital status:    Tobacco Use    Smoking status: Former     Current packs/day: 0.00     Average packs/day: 2.5 packs/day for 25.0 years (62.5 ttl pk-yrs)     Types: Cigarettes     Start date:      Quit date:      Years since quittin.2     Passive exposure: Past    Smokeless tobacco: Never   Substance and Sexual Activity    Alcohol use: Yes     Comment: once or twice a year    Drug use: Yes     Types:  Marijuana     Comment: Marijuana gummies - not everyday     Social Determinants of Health     Financial Resource Strain: Low Risk  (3/4/2024)    Overall Financial Resource Strain (CARDIA)     Difficulty of Paying Living Expenses: Not hard at all   Food Insecurity: No Food Insecurity (3/4/2024)    Hunger Vital Sign     Worried About Running Out of Food in the Last Year: Never true     Ran Out of Food in the Last Year: Never true   Transportation Needs: No Transportation Needs (3/4/2024)    PRAPARE - Transportation     Lack of Transportation (Medical): No     Lack of Transportation (Non-Medical): No   Physical Activity: Insufficiently Active (11/13/2023)    Exercise Vital Sign     Days of Exercise per Week: 2 days     Minutes of Exercise per Session: 10 min   Stress: No Stress Concern Present (3/3/2024)    [employer] [facility] - Occupational Stress Questionnaire     Feeling of Stress : Only a little   Social Connections: Unknown (11/13/2023)    Social Connection and Isolation Panel [NHANES]     Frequency of Communication with Friends and Family: Three times a week     Frequency of Social Gatherings with Friends and Family: Once a week     Active Member of Clubs or Organizations: No     Attends Club or Organization Meetings: Never     Marital Status:    Housing Stability: Low Risk  (3/4/2024)    Housing Stability Vital Sign     Unable to Pay for Housing in the Last Year: No     Number of Places Lived in the Last Year: 1     Unstable Housing in the Last Year: No     Review of Systems   Constitutional:  Negative for chills, diaphoresis, fever, malaise/fatigue and weight loss.   HENT:  Negative for congestion, ear discharge, ear pain, hearing loss, nosebleeds, sinus pain, sore throat and tinnitus.    Eyes:  Negative for blurred vision, double vision, photophobia, pain, discharge and redness.   Respiratory:  Positive for shortness of breath. Negative for cough, hemoptysis, sputum production,  "wheezing and stridor.    Cardiovascular:  Negative for chest pain, palpitations, orthopnea, claudication, leg swelling and PND.   Gastrointestinal:  Negative for abdominal pain, blood in stool, constipation, diarrhea, heartburn, melena, nausea and vomiting.   Genitourinary:  Negative for dysuria, flank pain, frequency, hematuria and urgency.   Musculoskeletal:  Negative for back pain, falls, joint pain, myalgias and neck pain.   Skin:  Negative for itching and rash.   Neurological:  Negative for dizziness, tingling, tremors, sensory change, speech change, focal weakness, seizures, loss of consciousness, weakness and headaches.   Endo/Heme/Allergies:  Negative for environmental allergies and polydipsia. Does not bruise/bleed easily.   Psychiatric/Behavioral:  Negative for depression, hallucinations, memory loss, substance abuse and suicidal ideas. The patient is not nervous/anxious and does not have insomnia.      Vitals  /79 (BP Location: Left arm, Patient Position: Sitting, BP Method: Medium (Automatic))   Pulse 93   Temp 97.7 °F (36.5 °C) (Oral)   Resp 16   Ht 5' 6" (1.676 m)   Wt 70.4 kg (155 lb 3.3 oz)   SpO2 97%   BMI 25.05 kg/m²   Physical Exam  Vitals and nursing note reviewed.   Constitutional:       General: He is not in acute distress.     Appearance: He is well-developed. He is not diaphoretic.   HENT:      Head: Normocephalic and atraumatic.      Nose: Nose normal.      Mouth/Throat:      Pharynx: No oropharyngeal exudate.   Eyes:      General: No scleral icterus.     Conjunctiva/sclera: Conjunctivae normal.      Pupils: Pupils are equal, round, and reactive to light.   Neck:      Thyroid: No thyromegaly.      Vascular: No JVD.   Cardiovascular:      Rate and Rhythm: Normal rate and regular rhythm.      Heart sounds: Normal heart sounds. No murmur heard.     No friction rub. No gallop.   Pulmonary:      Effort: Pulmonary effort is normal. No respiratory distress.      Breath sounds: No " stridor. Rales present. No wheezing.   Abdominal:      General: Bowel sounds are normal. There is no distension.      Palpations: Abdomen is soft.      Tenderness: There is no abdominal tenderness.   Musculoskeletal:         General: Normal range of motion.      Cervical back: Normal range of motion and neck supple.   Skin:     General: Skin is warm and dry.      Findings: No erythema.   Neurological:      Mental Status: He is alert and oriented to person, place, and time.      Cranial Nerves: No cranial nerve deficit.   Psychiatric:         Judgment: Judgment normal.         Labs:  Hospital Outpatient Visit on 03/18/2024   Component Date Value    Pre FVC 03/18/2024 3.91     PRE FEV5 03/18/2024 2.99     Pre FEV1 03/18/2024 3.36     Pre FEV1 FVC 03/18/2024 85.78     Pre FEF 25 75 03/18/2024 5.15 (H)     Pre PEF 03/18/2024 11.31 (H)     Pre  03/18/2024 5.80     Pre DLCO 03/18/2024 6.01 (L)     DLCO ADJ PRE 03/18/2024 6.28 (L)     DLCOVA PRE 03/18/2024 1.34 (L)     DLVAAdj PRE 03/18/2024 1.40 (L)     VA PRE 03/18/2024 4.49 (L)     IVC PRE 03/18/2024 3.87     Pre TLC 03/18/2024 5.00 (L)     VC PRE 03/18/2024 3.91     PRE IC 03/18/2024 2.08     Pre FRC PL 03/18/2024 2.92     Pre ERV 03/18/2024 1.84     Pre RV 03/18/2024 1.08 (L)     RVTLC PRE 03/18/2024 21.67 (L)     Raw PRE 03/18/2024 1.69 (L)     sGaw PRE 03/18/2024 0.16 (H)     FVC Ref 03/18/2024 3.77     FVC LLN 03/18/2024 2.82     FVC Pre Ref 03/18/2024 103.8     FEV05 REF 03/18/2024 2.32     FEV05 LLN 03/18/2024 1.18     PRE FEV05 REF 03/18/2024 128.8     FEV1 Ref 03/18/2024 2.90     FEV1 LLN 03/18/2024 2.12     FEV1 Pre Ref 03/18/2024 115.7     FEV1 FVC Ref 03/18/2024 77     FEV1 FVC LLN 03/18/2024 64     FEV1 FVC Pre Ref 03/18/2024 111.3     FEF 25 75 Ref 03/18/2024 2.33     FEF 25 75 LLN 03/18/2024 1.04     FEF 25 75 Pre Ref 03/18/2024 221.3     PEF Ref 03/18/2024 7.77     PEF LLN 03/18/2024 5.71     PEF Pre Ref 03/18/2024 145.6     TLC Ref 03/18/2024  6.31     TLC LLN 03/18/2024 5.16     TLC ULN 03/18/2024 7.47     TLC Pre Ref 03/18/2024 79.1     VC Ref 03/18/2024 3.77     VC LLN 03/18/2024 2.82     VC ULN 03/18/2024 4.74     VC Pre Ref 03/18/2024 103.8     REF IC 03/18/2024 2.70     LLN IC 03/18/2024 -5241590.30     ULN IC 03/18/2024 18325828.70     PRE REF IC 03/18/2024 76.7     FRCpleth Ref 03/18/2024 3.44     FRCpleth LLN 03/18/2024 2.45     FRC PLETH ULN 03/18/2024 4.42     FRCpleth PreRef 03/18/2024 85.0     ERV Ref 03/18/2024 1.00     ERV LLN 03/18/2024 -29439.00     ERV ULN 03/18/2024 58300.00     ERV Pre Ref 03/18/2024 184.6     RV Ref 03/18/2024 2.44     RV LLN 03/18/2024 1.77     RV ULN 03/18/2024 3.11     RV Pre Ref 03/18/2024 44.4     RVTLC Ref 03/18/2024 40     RVTLC LLN 03/18/2024 31     RV TLC ULN 03/18/2024 49     RVTLC Pre Ref 03/18/2024 54.1     Raw Ref 03/18/2024 3.06     Raw Pre Ref 03/18/2024 55.3     sGaw Ref 03/18/2024 0.08     sGaw Pre Ref 03/18/2024 188.3     DLCO Single Breath Ref 03/18/2024 24.40     DLCO Single Breath LLN 03/18/2024 17.48     DLCO SINGLEBREATH ULN 03/18/2024 31.33     DLCO Single Breath Pre R* 03/18/2024 24.6     DLCOc Single Breath Ref 03/18/2024 24.40     DLCOc Single Breath LLN 03/18/2024 17.48     DLCOC SINGLEBREATH ULN 03/18/2024 31.33     DLCOc Single Breath Pre * 03/18/2024 25.7     DLCOVA Ref 03/18/2024 3.86     DLCOVA LLN 03/18/2024 2.56     DLCOVA ULN 03/18/2024 5.17     DLCOVA Pre Ref 03/18/2024 34.7     DLCOc SBVA Ref 03/18/2024 3.86     DLCOc SBVA LLN 03/18/2024 2.56     DLCOCSBVA ULN 03/18/2024 5.17     DLCOc SBVA Pre Ref 03/18/2024 36.2     VA Single Breath Ref 03/18/2024 6.16     VA Single Breath LLN 03/18/2024 6.16     VA SINGLEBREATH ULN 03/18/2024 6.16     VA Single Breath Pre Ref 03/18/2024 72.8     IVC Single Breath Ref 03/18/2024 3.77     IVC Single Breath LLN 03/18/2024 2.82     IVC SINGLEBREATH ULN 03/18/2024 4.74     IVC Single Breath Pre Ref 03/18/2024 102.6    Lab Visit on 03/18/2024    Component Date Value    WBC 03/18/2024 8.40     RBC 03/18/2024 4.89     Hemoglobin 03/18/2024 14.4     Hematocrit 03/18/2024 44.6     MCV 03/18/2024 91     MCH 03/18/2024 29.4     MCHC 03/18/2024 32.3     RDW 03/18/2024 14.3     Platelets 03/18/2024 257     MPV 03/18/2024 12.2     Immature Granulocytes 03/18/2024 0.8 (H)     Gran # (ANC) 03/18/2024 6.6     Immature Grans (Abs) 03/18/2024 0.07 (H)     Lymph # 03/18/2024 0.8 (L)     Mono # 03/18/2024 0.7     Eos # 03/18/2024 0.2     Baso # 03/18/2024 0.04     nRBC 03/18/2024 0     Gran % 03/18/2024 78.3 (H)     Lymph % 03/18/2024 9.8 (L)     Mono % 03/18/2024 8.0     Eosinophil % 03/18/2024 2.6     Basophil % 03/18/2024 0.5     Differential Method 03/18/2024 Automated     Sodium 03/18/2024 140     Potassium 03/18/2024 3.7     Chloride 03/18/2024 108     CO2 03/18/2024 22 (L)     Glucose 03/18/2024 81     BUN 03/18/2024 16     Creatinine 03/18/2024 1.1     Calcium 03/18/2024 9.4     Total Protein 03/18/2024 6.9     Albumin 03/18/2024 3.8     Total Bilirubin 03/18/2024 0.6     Alkaline Phosphatase 03/18/2024 172 (H)     AST 03/18/2024 19     ALT 03/18/2024 18     eGFR 03/18/2024 >60.0     Anion Gap 03/18/2024 10            1/29/2024     1:32 PM   Pulmonary Function Tests   FVC 3.89 liters   FEV1 3.25 liters   TLC (liters) 5 liters   DLCO (ml/mmHg sec) 6.02 ml/mmHg sec   FVC% 103   FEV1% 111.9   FEF 25-75 4.46   FEF 25-75% 191.1   TLC% 79.3   RV 1.12   RV% 45.8   DLCO% 24.7         3/18/2024    12:12 PM 1/29/2024    11:37 AM 11/8/2023     2:33 PM 12/5/2022     2:16 PM 6/21/2022     3:47 PM   6MW   6MWT Status completed without stopping completed without stopping completed without stopping completed without stopping completed with stops   Patient Reported Lightheadedness Dyspnea;Lightheadedness Dyspnea Calf pain;Dizziness;Dyspnea;Leg pain;Lightheadedness;Other (Comment) Dyspnea;Lightheadedness   Was O2 used? Yes Yes Yes No Yes   Delivery Method Cannula;Pull  Tank;Continuous Flow Cannula;Pull Tank;Continuous Flow Demand Flow  Demand Flow;Cannula   6MW Distance walked (feet) 1300 feet 1250 feet 1550 feet 1100 feet 700 feet   Distance walked (meters) 396.24 meters 381 meters 472.44 meters 335.28 meters 213.36 meters   Did patient stop? No No No No Yes   Oxygen Saturation 98 % 99 % 95 % 91 % 97 %   Supplemental Oxygen 4 L/M 4 L/M Room Air Room Air Room Air   Heart Rate 87 bpm 85 bpm 75 bpm 91 bpm 93 bpm   Blood Pressure 119/79 123/81 127/78 134/94 129/67   Tanvi Dyspnea Rating  nothing at all very light light nothing at all very light   Oxygen Saturation 89 % 87 % 93 % 93 % 97 %   Supplemental Oxygen 4 L/M 4 L/M 4 L/M Room Air 3 L/M   Heart Rate 111 bpm 128 bpm 102 bpm 128 bpm 106 bpm   Blood Pressure 117/67 135/86 130/75 139/83 124/68   Tanvi Dyspnea Rating  moderate heavy heavy moderate heavy   Recovery Time (seconds) 70 seconds 92 seconds 240 seconds 240 seconds    Oxygen Saturation 97 % 98 % 97 % 98 % 98 %   Supplemental Oxygen 4 L/M 4 L/M Room Air Room Air Room Air   Heart Rate 97 bpm 92 bpm 83 bpm 102 bpm 93 bpm       Imaging:  Results for orders placed during the hospital encounter of 01/09/24    X-Ray Chest PA And Lateral    Narrative  EXAMINATION:  XR CHEST PA AND LATERAL    CLINICAL HISTORY:  Other forms of dyspnea    TECHNIQUE:  Chest 2 views    COMPARISON:  10/16/2023    FINDINGS:  Stable cardiomediastinal contours.  Unremarkable pulmonary vasculature.  Hypoinflation of the lungs.  Interstitial prominence in the pulmonary periphery intervally increased at the right mid to lower lung zone since the prior exam associated with linear opacities.  No acute osseous change.    Impression  Peripheral interstitial prominence appears to represent a chronic finding and suggests a component of chronic interstitial lung disease.  There has been interval increased focal peripheral opacity at the right mid to lower lung zone associated with linear opacities which may indicate  progressed focal fibrotic change/scarring, atelectasis, or pneumonitis.      Electronically signed by: Hermilo Odell MD  Date:    01/09/2024  Time:    09:24          Cardiodiagnostics:  Results for orders placed during the hospital encounter of 11/14/23    2D echo with color flow doppler and bubble contrast    Narrative  Transthoracic Echocardiogram Report    Patient Name  : DENISHA CRUZ  [address] [address]    Phone: [phone]    Interpreting Physician: UMANG MCKEON MD  Demographics:  Name:  DENISHA CRUZ   YOB: 1955  Age/Sex:  68, Male   Height: 5 ft 7 in  Weight:   160 pounds    BSA: 1.86 cc/m?  BMI:      25.1   Date of Study: 11/14/2023  Medical Record No:   [id number]   Location: OP  Referring Physician:   MARTHA CONNER   Technologist: Christy Kelly  Study:   Trans Thoracic Echocardiogram  Study Quality:   Good  Procedure  Type: Adult TTE (Date Study Ordered : 11/14/2023)  Components: 2D,Color Flow Doppler,Doppler,M-mode,TDI(Tissue Doppler  Imaging),Bubble  Referral diagnosis  ANN  Hemodynamics  Heart rate: 80 beats/min  Blood pressure:  112/77 mmHg  ECG:    Final Impressions  1. The study quality is good.  2. Global left ventricular systolic function is normal. The left  ventricular ejection fraction is 55-60%.  3. A bubble study was performed to rule out patent foramen ovale. The  bubble study was negative, ruling out patent foramen ovale.  4. Mild calcification of the mitral valve is noted. Mild (1+) mitral  regurgitation.  5. Trace aortic regurgitation. Trace tricuspid regurgitation. Trace  pulmonic regurgitation.  6. The pulmonary artery systolic pressure is 29 mmHg.    Intra-modality comparison (Echocardiogram)  This echocardiogram when compared with the latest echocardiogram-TTE  ([facility] [place]) dated 12/6/2022 shows:  1. Ejection fraction essentially remained unchanged (55% previous  study, 68% current study).  2. Mitral valve area by  pressure halftime has decreased from 4.9 cm?  to 3.7 cm?.    Findings    Left Atrium  The left atrium is normal in size. Left atrial diameter is 3.4 cms.    Right Atrium  The right atrium is normal in size. Right atrial diameter is 3.3 cms.    Left Ventricle  The left ventricle is normal in size. Left ventricular diastolic  dimension is 4.3 cms. Left ventricular systolic dimension is 2.7 cms.  Left ventricular diastolic septal thickness is 1 cm. Left ventricular  diastolic postero basal free wall thickness is 1.2 cms. Global left  ventricular systolic function is normal. The left ventricular  fractional shortening is 37.2%. The left ventricular ejection fraction  is 68%. Left ventricular diastolic function is abnormal (stage I  impaired relaxation). Left ventricular outflow tract diameter is 2.1  cms. Left ventricular outflow tract VTI is 16.8 cms. Left ventricular  outflow tract mean velocity is 0.6 m/s. Left ventricular mean gradient  is 2 mmHg.    Right Ventricle  Right ventricular diastolic dimension is 3.8 cms. Right ventricular  systolic pressure is 29 mmHg.    Atrial Septum  A bubble study was performed to rule out patent foramen ovale. A four  chamber image was obtained and a bolus of agitated saline was injected  into an antecubital vein. Opacification of the right heart chambers  was visualized. No bubbles were visualized in the left heart chambers  at rest or with valsalva. The bubble study was negative, ruling out  patent foramen ovale.    Ventricular Septum  The ventricular septum is normal.    Pulmonary Artery  The pulmonary artery systolic pressure is 29 mmHg.    Pulmonary Vein  The pulmonary vein appears normal.    IVC  The inferior vena cava is normal.    Pulmonic Valve  Evidence of pulmonic regurgitation is noted. Trace pulmonic  regurgitation. The peak velocity is 0.8 m/s. The mean velocity is 0.6  m/s. The peak trans pulmonic gradient is 2 mmHg. The mean trans  pulmonic gradient is 2  mmHg.    Tricuspid Valve  Evidence of tricuspid regurgitation is present. Trace tricuspid  regurgitation. The peak tricuspid regurgitant velocity is 2.2 m/s. The  peak trans tricuspid gradient is 19 mmHg.    Mitral Valve  Mild calcification of the mitral valve is noted. Mitral regurgitation  is noted. Mild (1+) mitral regurgitation. The pressure half time is 60  ms. The deceleration time is 243 ms. The E velocity is 0.3 m/s. The A  velocity is 0.7 m/s.    Aortic Valve  The aortic valve is tricuspid. Noted evidence of aortic regurgitation.  Trace aortic regurgitation. The trans-aortic peak velocity is 1.6 m/s.  The trans-aortic peak gradient is 10.9 mmHg. The trans-aortic mean  velocity is 1.1 m/s. The trans-aortic mean gradient is 6 mmHg. Aortic  valve VTI measures 29.3 cms. Mild calcification of the aortic valve is  noted with adequate cuspal excursion.  LVOT Diameter is 2.1 cms.    Aorta  Aortic root diameter is 3.3 cms. Ascending aorta diameter is 3.6 cms.  The aortic root appears normal.    Pericardium  The pericardium is normal in appearance.    Measurements  Left Atrium  Diameter   3.4cm(s)  Volume   28ml  Volume Index   15.1ml/m?    Right Atrium  Diameter   3.3cm(s)    Left Ventricle  End Diastolic Dimension   4.3cm(s)  End Systolic Dimension   2.7cm(s)  Posterior Basal Free Wall Thickness   1.2cm(s)  End Diastolic Septal Thickness   1cm(s)  Ejection Fraction   68%  Fractional Shortening   37.2    Right Ventricle  Diastolic Diameter   3.8cm(s)    Pulmonic Valve  Peak Pulmonic Velocity   0.8m/s  Mean Pulmonic Velocity   0.6m/s  Peak Trans Pulmonic Gradient   2mmHg  Mean Trans Pulmonic Gradient   2mmHg    Tricuspid Valve  Peak Tricuspid Regurgitant Velocity   2.2m/s  Peak Tricuspid Regurgitant Gradient   19mmHg  Pulmonary Artery Systolic Pressure  29mmHg    Mitral Valve  Pressure Half Time   60ms  Deceleration Time   243ms  A Velocity   0.7m/s  E Velocity   0.3m/s  Area By Pressure Half Time   3.7cm?    Aortic  Valve  Trans Aortic Peak Velocity   1.6m/s  Trans Aortic Mean Velocity   1.1m/s  Trans Aortic Peak Gradient   10.9mmHg  Trans Aortic Mean Gradient   6mmHg  Aortic Valve VTI   29.3cm(s)  LVOT Diameter   2.1cm(s)        Electronically Authenticated by  UMANG MCKEON MD  11/14/2023 , 14:58    Results for orders placed or performed during the hospital encounter of 03/01/24 (from the past 2160 hour(s))   CT Abdomen Pelvis  Without Contrast    Narrative    EXAMINATION:  CT ABDOMEN PELVIS WITHOUT CONTRAST    CLINICAL HISTORY:  Abdominal pain, acute, nonlocalized; prostate removed appendectomy prostate cancer    TECHNIQUE:  Iterative reconstruction technique was used.    CT/Cardiac Nuclear exams in prior 12 months: 3    COMPARISON:  06/12/2023.    FINDINGS:  Fluid-filled dilated esophagus with diffuse chronic changes with scarring and honeycombing at the lung bases.  There is contrast in the kidneys and bladder from recent CT.  Non contrasted liver and spleen, pancreas and adrenals are normal.  There are multiple cysts on the kidneys measuring up to 5.2 cm.  There is aneurysmal dilatation of the abdominal aorta in the infrarenal location 4.3 cm.  Large amount of stool in the sigmoid colon with diverticulosis but no diverticulitis      Impression    1. No acute intra-abdominal or pelvic abnormality  2. Aneurysmal dilatation of the infrarenal abdominal aorta unchanged  3. Multiple renal cysts  4. Significant stool in the sigmoid colon and diverticulosis but no diverticulitis      Electronically signed by: Monica Steven MD  Date:    03/02/2024  Time:    08:45   Results for orders placed or performed in visit on 03/01/24 (from the past 2160 hour(s))   CTA Chest Non-Coronary (PE Studies)    Narrative    EXAMINATION:  CTA CHEST NON CORONARY (PE STUDIES)    CLINICAL HISTORY:  Other forms of dyspnea    TECHNIQUE:  Thin section post IV contrast images were obtained to evaluate pulmonary vasculature. Coronal MIP reformations were  performed. Iterative reconstruction technique was used.    CT/cardiac nuclear exam/s in prior 12 months:  3.    COMPARISON:  Chest CT 01/10/2024.    FINDINGS:  No CT evidence of pulmonary thromboembolism.  Stable mildly enlarged bilateral hilar and mediastinal lymph nodes.  Stable fibrosis and emphysema.  Stable 1.3 cm lobular left lower lobe nodule (axial 56).  Stable 7 mm right lower lobe nodule (axial 56).  No new suspicious nodule or mass.  No pleural or pericardial fluid.  Dilated fluid-filled esophagus consistent with scleroderma.      Impression    1. No evidence of pulmonary thromboembolism.  2. Stable fibrosis, emphysema, and lymph node enlargement.  3. Stable bilateral lower lobe nodules.  4. Dilated fluid-filled esophagus, consistent with scleroderma.      Electronically signed by: Amol Menard MD  Date:    03/01/2024  Time:    15:10   Results for orders placed or performed in visit on 01/10/24 (from the past 2160 hour(s))   CT Chest Without Contrast    Narrative    EXAMINATION:  CT CHEST WITHOUT CONTRAST    CLINICAL HISTORY:  High resolution CT please; ILD, systemic sclerosis- progression?;  Interstitial lung disease with progressive fibrotic phenotype in diseases classified elsewhere    TECHNIQUE:  Iterative reconstruction technique was used.    CT/cardiac nuclear exam/s in prior 12 months:  2.    COMPARISON:  Chest CT 06/09/2023.    FINDINGS:  Stable mildly prominent mediastinal lymph nodes.  Stable 7 mm ovoid right lower lobe nodule (axial 65).  Stable 1.3 cm somewhat lobular left lower lobe nodule (axial 65).  No new suspicious nodule detected.  Stable septal thickening, honeycombing, and bronchiectasis with a lower lobe predominance, consistent with UIP.  Severe emphysema.  No pleural or pericardial fluid.  Dilated fluid-filled esophagus consistent with scleroderma.  Upper abdominal images demonstrate bilateral renal cysts.      Impression    1. Stable UIP and severe emphysema.  2. Stable  bilateral lower lobe nodules.  3. Dilated fluid-filled esophagus consistent with scleroderma.      Electronically signed by: Amol Mneard MD  Date:    01/10/2024  Time:    11:44    chest  Assessment:  1. ILD (interstitial lung disease)    2. Therapeutic drug monitoring    3. Chronic respiratory failure with hypoxia, on home O2 therapy    4. Systemic sclerosis with lung involvement    5. Combined pulmonary fibrosis and emphysema (CPFE)      Plan:    CPFE, with history of scleroderma. CT chest with diffuse mosaicism, bibasilar predominant fibrosis, traction bronchiectasis and some peripheral honeycombing. Mild restriction with severely diminished DLCO. on OFEV and cellcept( has chronic GI complains mostly from scleroderma and radiation proctitis rather than therpay) . Continue current therapy.   Follows with  rheumatology for  scleroderma with +CREST. Continue MMF 1500 mg BID.  Discussed the importance of compliance with oxygen supplementation. Recommend 4L with exertion.   Encourage compliance with trelegy   Follow up with urology for recurrent UIT and condom catheter  Follow up with cardiology, per pt's wife plan for LHC and RHC for evaluation of CAD and PH , patient advised to bring RHC report next visit  Pt interested in Transplant evaluation, he's been 5 years since his Prostate cancer, will sent referral outside [facility] given esophageal dysmotility and PH   RTC in 3 months or sooner if needed. Repeat PFTs and 6MWT at each visit.      Plan discussed with Dr Gonzales attestation to follow    Attending Note:     I have seen and evaluated the patient with the fellow. Their note reflects the content of our discussion and my plan of care.      Jigna Gonzales D.O.  Pulmonary/Critical Care and Lung Transplantation  [facility]

## 2024-03-23 LAB
DLCO ADJ PRE: 6.28 ML/(MIN*MMHG) (ref 17.48–31.33)
DLCO SINGLE BREATH LLN: 17.48
DLCO SINGLE BREATH PRE REF: 24.6 %
DLCO SINGLE BREATH REF: 24.4
DLCOC SBVA LLN: 2.56
DLCOC SBVA PRE REF: 36.2 %
DLCOC SBVA REF: 3.86
DLCOC SINGLE BREATH LLN: 17.48
DLCOC SINGLE BREATH PRE REF: 25.7 %
DLCOC SINGLE BREATH REF: 24.4
DLCOCSBVAULN: 5.17
DLCOCSINGLEBREATHULN: 31.33
DLCOSINGLEBREATHULN: 31.33
DLCOVA LLN: 2.56
DLCOVA PRE REF: 34.7 %
DLCOVA PRE: 1.34 ML/(MIN*MMHG*L) (ref 2.56–5.17)
DLCOVA REF: 3.86
DLCOVAULN: 5.17
DLVAADJ PRE: 1.4 ML/(MIN*MMHG*L) (ref 2.56–5.17)
ERV LLN: -16449
ERV PRE REF: 184.6 %
ERV REF: 1
ERVULN: ABNORMAL
FEF 25 75 LLN: 1.04
FEF 25 75 PRE REF: 221.3 %
FEF 25 75 REF: 2.33
FEV05 LLN: 1.18
FEV05 REF: 2.32
FEV1 FVC LLN: 64
FEV1 FVC PRE REF: 111.3 %
FEV1 FVC REF: 77
FEV1 LLN: 2.12
FEV1 PRE REF: 115.7 %
FEV1 REF: 2.9
FRCPLETH LLN: 2.45
FRCPLETH PREREF: 85 %
FRCPLETH REF: 3.44
FRCPLETHULN: 4.42
FVC LLN: 2.82
FVC PRE REF: 103.8 %
FVC REF: 3.77
IVC PRE: 3.87 L (ref 2.82–4.74)
IVC SINGLE BREATH LLN: 2.82
IVC SINGLE BREATH PRE REF: 102.6 %
IVC SINGLE BREATH REF: 3.77
IVCSINGLEBREATHULN: 4.74
LLN IC: -9999997.3
PEF LLN: 5.71
PEF PRE REF: 145.6 %
PEF REF: 7.77
PHYSICIAN COMMENT: ABNORMAL
PRE DLCO: 6.01 ML/(MIN*MMHG) (ref 17.48–31.33)
PRE ERV: 1.84 L (ref -16449–16451)
PRE FEF 25 75: 5.15 L/S (ref 1.04–4.11)
PRE FET 100: 5.8 SEC
PRE FEV05 REF: 128.8 %
PRE FEV1 FVC: 85.78 % (ref 63.9–88.66)
PRE FEV1: 3.36 L (ref 2.12–3.63)
PRE FEV5: 2.99 L (ref 1.18–3.45)
PRE FRC PL: 2.92 L (ref 2.45–4.42)
PRE FVC: 3.91 L (ref 2.82–4.74)
PRE IC: 2.08 L (ref -9999997.3–#######.####)
PRE PEF: 11.31 L/S (ref 5.71–9.83)
PRE REF IC: 76.7 %
PRE RV: 1.08 L (ref 1.77–3.11)
PRE TLC: 5 L (ref 5.16–7.47)
RAW PRE REF: 55.3 %
RAW PRE: 1.69 CMH2O*S/L (ref 3.06–3.06)
RAW REF: 3.06
REF IC: 2.7
RV LLN: 1.77
RV PRE REF: 44.4 %
RV REF: 2.44
RVTLC LLN: 31
RVTLC PRE REF: 54.1 %
RVTLC PRE: 21.67 % (ref 31.11–49.07)
RVTLC REF: 40
RVTLCULN: 49
RVULN: 3.11
SGAW PRE REF: 188.3 %
SGAW PRE: 0.16 1/(CMH2O*S) (ref 0.08–0.08)
SGAW REF: 0.08
TLC LLN: 5.16
TLC PRE REF: 79.1 %
TLC REF: 6.31
TLC ULN: 7.47
ULN IC: ABNORMAL
VA PRE: 4.49 L (ref 6.16–6.16)
VA SINGLE BREATH LLN: 6.16
VA SINGLE BREATH PRE REF: 72.8 %
VA SINGLE BREATH REF: 6.16
VASINGLEBREATHULN: 6.16
VC LLN: 2.82
VC PRE REF: 103.8 %
VC PRE: 3.91 L (ref 2.82–4.74)
VC REF: 3.77
VC ULN: 4.74

## 2024-03-27 ENCOUNTER — TELEPHONE (OUTPATIENT)
Dept: TRANSPLANT | Facility: CLINIC | Age: 69
End: 2024-03-27
Payer: COMMERCIAL

## 2024-04-08 ENCOUNTER — OFFICE VISIT (OUTPATIENT)
Dept: TRANSPLANT | Facility: CLINIC | Age: 69
End: 2024-04-08
Payer: COMMERCIAL

## 2024-04-08 VITALS
HEART RATE: 88 BPM | WEIGHT: 162.94 LBS | BODY MASS INDEX: 26.19 KG/M2 | DIASTOLIC BLOOD PRESSURE: 70 MMHG | SYSTOLIC BLOOD PRESSURE: 102 MMHG | OXYGEN SATURATION: 98 % | HEIGHT: 66 IN

## 2024-04-08 DIAGNOSIS — J84.9 ILD (INTERSTITIAL LUNG DISEASE): ICD-10-CM

## 2024-04-08 DIAGNOSIS — N31.9 NEUROGENIC BLADDER: ICD-10-CM

## 2024-04-08 DIAGNOSIS — I71.43 INFRARENAL ABDOMINAL AORTIC ANEURYSM (AAA) WITHOUT RUPTURE: ICD-10-CM

## 2024-04-08 DIAGNOSIS — M34.81 SYSTEMIC SCLEROSIS WITH LUNG INVOLVEMENT: ICD-10-CM

## 2024-04-08 DIAGNOSIS — J96.11 CHRONIC RESPIRATORY FAILURE WITH HYPOXIA: ICD-10-CM

## 2024-04-08 DIAGNOSIS — I27.20 PULMONARY HYPERTENSION: Primary | ICD-10-CM

## 2024-04-08 PROBLEM — I27.9 CHRONIC PULMONARY HEART DISEASE: Status: ACTIVE | Noted: 2024-04-08

## 2024-04-08 PROCEDURE — 99205 OFFICE O/P NEW HI 60 MIN: CPT | Mod: S$GLB,,,

## 2024-04-08 PROCEDURE — 99999 PR PBB SHADOW E&M-EST. PATIENT-LVL IV: CPT | Mod: PBBFAC,,,

## 2024-04-08 PROCEDURE — 1159F MED LIST DOCD IN RCRD: CPT | Mod: CPTII,S$GLB,,

## 2024-04-08 PROCEDURE — 1160F RVW MEDS BY RX/DR IN RCRD: CPT | Mod: CPTII,S$GLB,,

## 2024-04-08 PROCEDURE — 1101F PT FALLS ASSESS-DOCD LE1/YR: CPT | Mod: CPTII,S$GLB,,

## 2024-04-08 PROCEDURE — 3288F FALL RISK ASSESSMENT DOCD: CPT | Mod: CPTII,S$GLB,,

## 2024-04-08 PROCEDURE — 1126F AMNT PAIN NOTED NONE PRSNT: CPT | Mod: CPTII,S$GLB,,

## 2024-04-08 PROCEDURE — 3074F SYST BP LT 130 MM HG: CPT | Mod: CPTII,S$GLB,,

## 2024-04-08 PROCEDURE — 3078F DIAST BP <80 MM HG: CPT | Mod: CPTII,S$GLB,,

## 2024-04-08 PROCEDURE — 3008F BODY MASS INDEX DOCD: CPT | Mod: CPTII,S$GLB,,

## 2024-04-08 NOTE — PROGRESS NOTES
Subjective:    Patient ID:  Catrachito Kraft is a 68 y.o. male who presents for evaluation of Pulmonary Hypertension.    HPI   Mr. Kraft was referred by Dr. العراقي ([facility]) with chronic hypoxemic respiratory failure secondary to progressive ILD due to systemic sclerosis (CREST) (on mycophenolate and OFEV) on home oxygen, COPD, prostate cancer with radiation induced neurogenic bladder, frequent UTI's, GERD and HF, AAA (no rupture, monitored), former smoker quit in 2005. Hospitalized 3 weeks ago fro sepsis and UTI.  Denies liver, kidney, thyroid disease.     Mr. Kraft is here with his wife. He is wearing 4L O2 via NC. He reports SOB, that has progressively worsened, such that he feels it with minimal exertion. He sometimes wears O2 at night. Uses 2 pillows to sleep and has not been tested for sleep apnea. He denies fluid retention, although sometimes feels full in his stomach. He endorses chest tightness and palpitations when he is SOB. Mr. Kraft reports some low BPs with accompanying symptoms of dizziness and nausea. He was recently switched from low dose nifedipine to amlodipine for his Raynaud's, but doesn't think the pressures are different - always seem low sometimes. He is very worried about his increased oxygen dependence and that he feels SOB with minimal effort.    He notes that he was diagnosed with scleroderma, Raynauds, and CPFE at about the same time in 2021. Follows closely with Dr. العراقي ([facility]). Has a pulm, cards, and been seen by [facility].    Discussed the pathophysiology of pulmonary hypertension, different groups, associated conditions, and diagnostic testing. Explained the purpose and steps of the RHC. Reviewed all diagnostic tests with the patient. Briefly discussed treatment options and goal of therapy.    PH RISK FACTORS YES NO COMMENT   Anorexigens     [] [x]    Connective Tissue Disease:  [x] []    Raynauds:  [x] []    DVT/PE:  [] [x]    On anticoagulation:  [] [x]     HIV:  [] []    Left Heart Disease: [] [x]    COPD:  [x] []    On oxygen:  [x] []    Kidney Disease:  [] [x]    Liver Disease:  [] [x]    Thyroid Disease: [] [x]    On Thyroid replacement:  [] [x]    Obesity:  [] [x]    Sleep Apnea: [] [x] Not tested   On CPAP:  [] [x]      6MWT:   1/29/2024 3/18/2024   6MW     6MWT Status completed without stopping  completed without stopping    Patient Reported Dyspnea;Lightheadedness  Lightheadedness    Was O2 used? Yes  Yes    Delivery Method Cannula;Pull Tank;Continuous Flow  Cannula;Pull Tank;Continuous Flow    6MW Distance walked (feet) 1250 feet  1300 feet    Distance walked (meters) 381 meters  396.24 meters    Did patient stop? No  No    Oxygen Saturation 99 %  98 %    Supplemental Oxygen 4 L/M  4 L/M    Heart Rate 85 bpm  87 bpm    Blood Pressure 123/81  119/79    Tanvi Dyspnea Rating  very light  nothing at all    Oxygen Saturation 87 %  89 %    Supplemental Oxygen 4 L/M  4 L/M    Heart Rate 128 bpm  111 bpm    Blood Pressure 135/86  117/67    Tanvi Dyspnea Rating  heavy  moderate    Recovery Time (seconds) 92 seconds  70 seconds    Oxygen Saturation 98 %  97 %    Supplemental Oxygen 4 L/M  4 L/M    Heart Rate 92 bpm  97 bpm        ECHO: 11/14/2023  Final Impressions   1. The study quality is good.   2. Global left ventricular systolic function is normal. The left   ventricular ejection fraction is 55-60%.   3. A bubble study was performed to rule out patent foramen ovale. The   bubble study was negative, ruling out patent foramen ovale.    4. Mild calcification of the mitral valve is noted. Mild (1+) mitral   regurgitation.   5. Trace aortic regurgitation. Trace tricuspid regurgitation. Trace   pulmonic regurgitation.    6. The pulmonary artery systolic pressure is 29 mmHg.     Intra-modality comparison (Echocardiogram)   This echocardiogram when compared with the latest echocardiogram-TTE   ([facility]) dated 12/6/2022 shows:    1. Ejection fraction essentially  remained unchanged (55% previous   study, 68% current study).    2. Mitral valve area by pressure halftime has decreased from 4.9 cm?   to 3.7 cm?.      STRESS ECHO:   1.Stress EKG is normal. 2.The heart rate recovery is normal. 1.This is probably a normal perfusion study. 2.This scan is suggestive of low to moderate risk for future cardiovascular events. 3.Small fixed perfusion abnormality of mild intensity in the inferior lateral region. Consider Diaphramatic attenuation4.The left ventricular cavity is noted to be normal on the stress study. The left ventricular ejection fraction was calculated to be 63% and left ventricular global function is normal. 5.The study quality is excellent.     RHC: not done  V/Q Scan: not done  PFTs:       1/29/2024     1:32 PM   Pulmonary Function Tests   FVC 3.89 liters   FEV1 3.25 liters   TLC (liters) 5 liters   DLCO (ml/mmHg sec) 6.02 ml/mmHg sec   FVC% 103   FEV1% 111.9   FEF 25-75 4.46   FEF 25-75% 191.1   TLC% 79.3   RV 1.12   RV% 45.8   DLCO% 24.7      Spirometry is normal. Lung volumes show mild restriction is present. Airway mechanics show normal airway resistance and conductance. DLCO is severely decreased.       CTA  FINDINGS:  No CT evidence of pulmonary thromboembolism.  Stable mildly enlarged bilateral hilar and mediastinal lymph nodes.  Stable fibrosis and emphysema.  Stable 1.3 cm lobular left lower lobe nodule (axial 56).  Stable 7 mm right lower lobe nodule (axial 56).  No new suspicious nodule or mass.  No pleural or pericardial fluid.  Dilated fluid-filled esophagus consistent with scleroderma.     Impression:     1. No evidence of pulmonary thromboembolism.  2. Stable fibrosis, emphysema, and lymph node enlargement.  3. Stable bilateral lower lobe nodules.  4. Dilated fluid-filled esophagus, consistent with scleroderma.     Review of Systems   Constitutional: Positive for malaise/fatigue and weight loss.   Respiratory:  Positive for shortness of breath. Negative  "for wheezing.    Endocrine: Negative.    Hematologic/Lymphatic: Negative.    Musculoskeletal: Negative.    Gastrointestinal:  Positive for bloating.   Neurological: Negative.         Objective: /70   Pulse 88   Ht 5' 6" (1.676 m)   Wt 73.9 kg (162 lb 14.7 oz)   SpO2 98%   BMI 26.30 kg/m²       Physical Exam  Constitutional:       General: He is not in acute distress.     Appearance: Normal appearance. He is ill-appearing.   HENT:      Head: Normocephalic.   Cardiovascular:      Rate and Rhythm: Normal rate and regular rhythm.      Pulses: Normal pulses.      Heart sounds: Normal heart sounds.   Pulmonary:      Comments: Wearing O2 and small concentrator. Uses 4L with exertion.  Musculoskeletal:         General: Normal range of motion.      Cervical back: Normal range of motion.   Skin:     General: Skin is dry.      Capillary Refill: Capillary refill takes less than 2 seconds.   Neurological:      Mental Status: He is oriented to person, place, and time.   Psychiatric:         Mood and Affect: Mood normal.         Behavior: Behavior normal.           Lab Results   Component Value Date     03/18/2024    K 3.7 03/18/2024    MG 2.0 03/05/2024     03/18/2024    CO2 22 (L) 03/18/2024    BUN 16 03/18/2024    CREATININE 1.1 03/18/2024    GLU 81 03/18/2024    AST 19 03/18/2024    ALT 18 03/18/2024    ALBUMIN 3.8 03/18/2024    PROT 6.9 03/18/2024    BILITOT 0.6 03/18/2024       Magnesium   Date Value Ref Range Status   03/05/2024 2.0 1.6 - 2.6 mg/dL Final       Lab Results   Component Value Date    WBC 8.40 03/18/2024    HGB 14.4 03/18/2024    HCT 44.6 03/18/2024    MCV 91 03/18/2024     03/18/2024       Lab Results   Component Value Date    INR 1.0 03/01/2024    INR 1.0 03/26/2019         Assessment:       1. Pulmonary hypertension    2. Chronic respiratory failure with hypoxia    3. ILD (interstitial lung disease)    4. Chronic infrarenal abdominal aortic aneurysm (AAA) without rupture (4.3cm: " CT 3/2024)    5. Post-prostate cancer treatment related neurogenic bladder associated chronic catheter dependent    6. Systemic sclerosis with lung involvement         Plan:     Mr. Kraft is at high risk for WHO Group 1 PAH in the setting of CTD. ECHO with normal PASP, maybe slight enlargement of RV, LV size and function normal, per report. Lung function has improved with therapy but DLCO is severely decreased. Consider group WHO Group 3 based on CT CHEST, though his PFTs only reflect mild disease.He is WHO FC III (no syncope).     We discussed obtaining a RHC vs R/LHC. He is followed by cards. Not sure LHC is warranted at this time and will likely be done if he follows at a transplant center. Also, hemodynamic assessment is more robust when doing RHC only.   Mr. Kraft would rather have the RHC done first.    Schedule right heart cath with labs and appointment afterwards.    Please call Rukhsana at [phone] with any questions.    Advance Care Planning     Date: 04/08/2024    Power of   I initiated the process of voluntary advance care planning today and explained the importance of this process to the patient.  I introduced the concept of advance directives to the patient, as well. Then the patient received detailed information about the importance of designating a Health Care Power of  (HCPOA). He was also instructed to communicate with this person about their wishes for future healthcare, should he become sick and lose decision-making capacity. The patient has not previously appointed a HCPOA. After our discussion, the patient has decided to complete a HCPOA and has appointed his significant other, health care agent: Julianne Kraft. I spent a total time of 10 minutes discussing this issue with the patient.             Thank you for allowing us to participate in the cardiopulmonary management of this patient. We look forward to partnering in their care. Please contact us with any questions or  concerns you may have regarding this patient.     Ximena Titus DNP, APRN  Pulmonary Hypertension Department    [facility]  Dr. Marshall Titus DNP, BHUMI Romo, IGNACIAN, RN

## 2024-04-08 NOTE — H&P (VIEW-ONLY)
Subjective:    Patient ID:  Catrachito Kraft is a 68 y.o. male who presents for evaluation of Pulmonary Hypertension.    HPI   Mr. Kraft was referred by Dr. العراقي ([facility]) with chronic hypoxemic respiratory failure secondary to progressive ILD due to systemic sclerosis (CREST) (on mycophenolate and OFEV) on home oxygen, COPD, prostate cancer with radiation induced neurogenic bladder, frequent UTI's, GERD and HF, AAA (no rupture, monitored), former smoker quit in 2005. Hospitalized 3 weeks ago fro sepsis and UTI.  Denies liver, kidney, thyroid disease.     Mr. Kraft is here with his wife. He is wearing 4L O2 via NC. He reports SOB, that has progressively worsened, such that he feels it with minimal exertion. He sometimes wears O2 at night. Uses 2 pillows to sleep and has not been tested for sleep apnea. He denies fluid retention, although sometimes feels full in his stomach. He endorses chest tightness and palpitations when he is SOB. Mr. Kraft reports some low BPs with accompanying symptoms of dizziness and nausea. He was recently switched from low dose nifedipine to amlodipine for his Raynaud's, but doesn't think the pressures are different - always seem low sometimes. He is very worried about his increased oxygen dependence and that he feels SOB with minimal effort.    He notes that he was diagnosed with scleroderma, Raynauds, and CPFE at about the same time in 2021. Follows closely with Dr. العراقي ([facility]). Has a pulm, cards, and been seen by [facility].    Discussed the pathophysiology of pulmonary hypertension, different groups, associated conditions, and diagnostic testing. Explained the purpose and steps of the RHC. Reviewed all diagnostic tests with the patient. Briefly discussed treatment options and goal of therapy.    PH RISK FACTORS YES NO COMMENT   Anorexigens     [] [x]    Connective Tissue Disease:  [x] []    Raynauds:  [x] []    DVT/PE:  [] [x]    On anticoagulation:  [] [x]     HIV:  [] []    Left Heart Disease: [] [x]    COPD:  [x] []    On oxygen:  [x] []    Kidney Disease:  [] [x]    Liver Disease:  [] [x]    Thyroid Disease: [] [x]    On Thyroid replacement:  [] [x]    Obesity:  [] [x]    Sleep Apnea: [] [x] Not tested   On CPAP:  [] [x]      6MWT:   1/29/2024 3/18/2024   6MW     6MWT Status completed without stopping  completed without stopping    Patient Reported Dyspnea;Lightheadedness  Lightheadedness    Was O2 used? Yes  Yes    Delivery Method Cannula;Pull Tank;Continuous Flow  Cannula;Pull Tank;Continuous Flow    6MW Distance walked (feet) 1250 feet  1300 feet    Distance walked (meters) 381 meters  396.24 meters    Did patient stop? No  No    Oxygen Saturation 99 %  98 %    Supplemental Oxygen 4 L/M  4 L/M    Heart Rate 85 bpm  87 bpm    Blood Pressure 123/81  119/79    Tanvi Dyspnea Rating  very light  nothing at all    Oxygen Saturation 87 %  89 %    Supplemental Oxygen 4 L/M  4 L/M    Heart Rate 128 bpm  111 bpm    Blood Pressure 135/86  117/67    Tanvi Dyspnea Rating  heavy  moderate    Recovery Time (seconds) 92 seconds  70 seconds    Oxygen Saturation 98 %  97 %    Supplemental Oxygen 4 L/M  4 L/M    Heart Rate 92 bpm  97 bpm        ECHO: 11/14/2023  Final Impressions   1. The study quality is good.   2. Global left ventricular systolic function is normal. The left   ventricular ejection fraction is 55-60%.   3. A bubble study was performed to rule out patent foramen ovale. The   bubble study was negative, ruling out patent foramen ovale.    4. Mild calcification of the mitral valve is noted. Mild (1+) mitral   regurgitation.   5. Trace aortic regurgitation. Trace tricuspid regurgitation. Trace   pulmonic regurgitation.    6. The pulmonary artery systolic pressure is 29 mmHg.     Intra-modality comparison (Echocardiogram)   This echocardiogram when compared with the latest echocardiogram-TTE   ([facility]) dated 12/6/2022 shows:    1. Ejection fraction essentially  remained unchanged (55% previous   study, 68% current study).    2. Mitral valve area by pressure halftime has decreased from 4.9 cm?   to 3.7 cm?.      STRESS ECHO:   1.Stress EKG is normal. 2.The heart rate recovery is normal. 1.This is probably a normal perfusion study. 2.This scan is suggestive of low to moderate risk for future cardiovascular events. 3.Small fixed perfusion abnormality of mild intensity in the inferior lateral region. Consider Diaphramatic attenuation4.The left ventricular cavity is noted to be normal on the stress study. The left ventricular ejection fraction was calculated to be 63% and left ventricular global function is normal. 5.The study quality is excellent.     RHC: not done  V/Q Scan: not done  PFTs:       1/29/2024     1:32 PM   Pulmonary Function Tests   FVC 3.89 liters   FEV1 3.25 liters   TLC (liters) 5 liters   DLCO (ml/mmHg sec) 6.02 ml/mmHg sec   FVC% 103   FEV1% 111.9   FEF 25-75 4.46   FEF 25-75% 191.1   TLC% 79.3   RV 1.12   RV% 45.8   DLCO% 24.7      Spirometry is normal. Lung volumes show mild restriction is present. Airway mechanics show normal airway resistance and conductance. DLCO is severely decreased.       CTA  FINDINGS:  No CT evidence of pulmonary thromboembolism.  Stable mildly enlarged bilateral hilar and mediastinal lymph nodes.  Stable fibrosis and emphysema.  Stable 1.3 cm lobular left lower lobe nodule (axial 56).  Stable 7 mm right lower lobe nodule (axial 56).  No new suspicious nodule or mass.  No pleural or pericardial fluid.  Dilated fluid-filled esophagus consistent with scleroderma.     Impression:     1. No evidence of pulmonary thromboembolism.  2. Stable fibrosis, emphysema, and lymph node enlargement.  3. Stable bilateral lower lobe nodules.  4. Dilated fluid-filled esophagus, consistent with scleroderma.     Review of Systems   Constitutional: Positive for malaise/fatigue and weight loss.   Respiratory:  Positive for shortness of breath. Negative  "for wheezing.    Endocrine: Negative.    Hematologic/Lymphatic: Negative.    Musculoskeletal: Negative.    Gastrointestinal:  Positive for bloating.   Neurological: Negative.         Objective: /70   Pulse 88   Ht 5' 6" (1.676 m)   Wt 73.9 kg (162 lb 14.7 oz)   SpO2 98%   BMI 26.30 kg/m²       Physical Exam  Constitutional:       General: He is not in acute distress.     Appearance: Normal appearance. He is ill-appearing.   HENT:      Head: Normocephalic.   Cardiovascular:      Rate and Rhythm: Normal rate and regular rhythm.      Pulses: Normal pulses.      Heart sounds: Normal heart sounds.   Pulmonary:      Comments: Wearing O2 and small concentrator. Uses 4L with exertion.  Musculoskeletal:         General: Normal range of motion.      Cervical back: Normal range of motion.   Skin:     General: Skin is dry.      Capillary Refill: Capillary refill takes less than 2 seconds.   Neurological:      Mental Status: He is oriented to person, place, and time.   Psychiatric:         Mood and Affect: Mood normal.         Behavior: Behavior normal.           Lab Results   Component Value Date     03/18/2024    K 3.7 03/18/2024    MG 2.0 03/05/2024     03/18/2024    CO2 22 (L) 03/18/2024    BUN 16 03/18/2024    CREATININE 1.1 03/18/2024    GLU 81 03/18/2024    AST 19 03/18/2024    ALT 18 03/18/2024    ALBUMIN 3.8 03/18/2024    PROT 6.9 03/18/2024    BILITOT 0.6 03/18/2024       Magnesium   Date Value Ref Range Status   03/05/2024 2.0 1.6 - 2.6 mg/dL Final       Lab Results   Component Value Date    WBC 8.40 03/18/2024    HGB 14.4 03/18/2024    HCT 44.6 03/18/2024    MCV 91 03/18/2024     03/18/2024       Lab Results   Component Value Date    INR 1.0 03/01/2024    INR 1.0 03/26/2019         Assessment:       1. Pulmonary hypertension    2. Chronic respiratory failure with hypoxia    3. ILD (interstitial lung disease)    4. Chronic infrarenal abdominal aortic aneurysm (AAA) without rupture (4.3cm: " CT 3/2024)    5. Post-prostate cancer treatment related neurogenic bladder associated chronic catheter dependent    6. Systemic sclerosis with lung involvement         Plan:     Mr. Kraft is at high risk for WHO Group 1 PAH in the setting of CTD. ECHO with normal PASP, maybe slight enlargement of RV, LV size and function normal, per report. Lung function has improved with therapy but DLCO is severely decreased. Consider group WHO Group 3 based on CT CHEST, though his PFTs only reflect mild disease.He is WHO FC III (no syncope).     We discussed obtaining a RHC vs R/LHC. He is followed by cards. Not sure LHC is warranted at this time and will likely be done if he follows at a transplant center. Also, hemodynamic assessment is more robust when doing RHC only.   Mr. Kraft would rather have the RHC done first.    Schedule right heart cath with labs and appointment afterwards.    Please call Rukhsana at [phone] with any questions.    Advance Care Planning     Date: 04/08/2024    Power of   I initiated the process of voluntary advance care planning today and explained the importance of this process to the patient.  I introduced the concept of advance directives to the patient, as well. Then the patient received detailed information about the importance of designating a Health Care Power of  (HCPOA). He was also instructed to communicate with this person about their wishes for future healthcare, should he become sick and lose decision-making capacity. The patient has not previously appointed a HCPOA. After our discussion, the patient has decided to complete a HCPOA and has appointed his significant other, health care agent: Julianne Kraft. I spent a total time of 10 minutes discussing this issue with the patient.             Thank you for allowing us to participate in the cardiopulmonary management of this patient. We look forward to partnering in their care. Please contact us with any questions or  concerns you may have regarding this patient.     Ximena Titus DNP, APRN  Pulmonary Hypertension Department    [facility]  Dr. Marshall Titus DNP, BHUMI Romo, IGNACIAN, RN

## 2024-04-19 ENCOUNTER — OFFICE VISIT (OUTPATIENT)
Dept: TRANSPLANT | Facility: CLINIC | Age: 69
End: 2024-04-19
Payer: COMMERCIAL

## 2024-04-19 ENCOUNTER — HOSPITAL ENCOUNTER (OUTPATIENT)
Facility: HOSPITAL | Age: 69
Discharge: HOME | End: 2024-04-19
Attending: INTERNAL MEDICINE | Admitting: INTERNAL MEDICINE
Payer: COMMERCIAL

## 2024-04-19 VITALS
HEART RATE: 80 BPM | BODY MASS INDEX: 23.81 KG/M2 | DIASTOLIC BLOOD PRESSURE: 79 MMHG | SYSTOLIC BLOOD PRESSURE: 121 MMHG | HEIGHT: 67 IN | WEIGHT: 151.69 LBS

## 2024-04-19 VITALS
RESPIRATION RATE: 20 BRPM | DIASTOLIC BLOOD PRESSURE: 95 MMHG | WEIGHT: 151.56 LBS | HEART RATE: 89 BPM | HEIGHT: 67 IN | SYSTOLIC BLOOD PRESSURE: 147 MMHG | OXYGEN SATURATION: 100 % | TEMPERATURE: 98 F | BODY MASS INDEX: 23.79 KG/M2

## 2024-04-19 DIAGNOSIS — J96.11 CHRONIC RESPIRATORY FAILURE WITH HYPOXIA: Primary | ICD-10-CM

## 2024-04-19 DIAGNOSIS — J84.9 ILD (INTERSTITIAL LUNG DISEASE): ICD-10-CM

## 2024-04-19 DIAGNOSIS — N31.9 NEUROGENIC BLADDER: ICD-10-CM

## 2024-04-19 DIAGNOSIS — M34.81 SYSTEMIC SCLEROSIS WITH LUNG INVOLVEMENT: ICD-10-CM

## 2024-04-19 DIAGNOSIS — I27.20 PULMONARY HYPERTENSION: ICD-10-CM

## 2024-04-19 PROCEDURE — 99214 OFFICE O/P EST MOD 30 MIN: CPT | Mod: 25,S$GLB,,

## 2024-04-19 PROCEDURE — 1160F RVW MEDS BY RX/DR IN RCRD: CPT | Mod: CPTII,S$GLB,,

## 2024-04-19 PROCEDURE — C1751 CATH, INF, PER/CENT/MIDLINE: HCPCS | Mod: TXP | Performed by: INTERNAL MEDICINE

## 2024-04-19 PROCEDURE — 3074F SYST BP LT 130 MM HG: CPT | Mod: CPTII,S$GLB,,

## 2024-04-19 PROCEDURE — 93451 RIGHT HEART CATH: CPT | Mod: 26,NTX,, | Performed by: INTERNAL MEDICINE

## 2024-04-19 PROCEDURE — 93451 RIGHT HEART CATH: CPT | Mod: NTX | Performed by: INTERNAL MEDICINE

## 2024-04-19 PROCEDURE — 1157F ADVNC CARE PLAN IN RCRD: CPT | Mod: CPTII,S$GLB,,

## 2024-04-19 PROCEDURE — 1159F MED LIST DOCD IN RCRD: CPT | Mod: CPTII,S$GLB,,

## 2024-04-19 PROCEDURE — 25000003 PHARM REV CODE 250: Mod: NTX | Performed by: INTERNAL MEDICINE

## 2024-04-19 PROCEDURE — 25000003 PHARM REV CODE 250: Mod: TXP | Performed by: INTERNAL MEDICINE

## 2024-04-19 PROCEDURE — 1101F PT FALLS ASSESS-DOCD LE1/YR: CPT | Mod: CPTII,S$GLB,,

## 2024-04-19 PROCEDURE — C1894 INTRO/SHEATH, NON-LASER: HCPCS | Mod: NTX | Performed by: INTERNAL MEDICINE

## 2024-04-19 PROCEDURE — 3078F DIAST BP <80 MM HG: CPT | Mod: CPTII,S$GLB,,

## 2024-04-19 PROCEDURE — 3288F FALL RISK ASSESSMENT DOCD: CPT | Mod: CPTII,S$GLB,,

## 2024-04-19 PROCEDURE — 99999 PR PBB SHADOW E&M-EST. PATIENT-LVL IV: CPT | Mod: PBBFAC,,,

## 2024-04-19 PROCEDURE — 1126F AMNT PAIN NOTED NONE PRSNT: CPT | Mod: CPTII,S$GLB,,

## 2024-04-19 PROCEDURE — 3008F BODY MASS INDEX DOCD: CPT | Mod: CPTII,S$GLB,,

## 2024-04-19 RX ORDER — AMLODIPINE BESYLATE 2.5 MG/1
2.5 TABLET ORAL DAILY
COMMUNITY

## 2024-04-19 RX ORDER — LIDOCAINE HYDROCHLORIDE 20 MG/ML
INJECTION, SOLUTION EPIDURAL; INFILTRATION; INTRACAUDAL; PERINEURAL
Status: DISCONTINUED | OUTPATIENT
Start: 2024-04-19 | End: 2024-04-19 | Stop reason: HOSPADM

## 2024-04-19 NOTE — DISCHARGE INSTRUCTIONS

## 2024-04-19 NOTE — Clinical Note
The PA catheter was repositioned to the main pulmonary artery. Hemodynamics were performed. O2 saturation was measured at 63%. THERMODILUTIONS PERFORMED   MELE: CO: 3.65 CI: 2.03  THERMODILUTIONS: CO: 3.64 CI: 2.03

## 2024-04-19 NOTE — DISCHARGE SUMMARY
[facility] Cath Lab  Discharge Note  Short Stay    Procedure(s) (LRB):  INSERTION, CATHETER, RIGHT HEART (Right)      OUTCOME: Patient tolerated treatment/procedure well without complication and is now ready for discharge.    DISPOSITION: Home or Self Care    FINAL DIAGNOSIS:  <principal problem not specified>    FOLLOWUP: In clinic    DISCHARGE INSTRUCTIONS:  No discharge procedures on file.     TIME SPENT ON DISCHARGE: 25 minutes

## 2024-04-19 NOTE — BRIEF OP NOTE
Patient tolerated the procedure well. Please see complete RHC procedure note for full details and results. Stable to return from cath lab to their hospital room.  Procedure: Right heart catheterization   Procedure date: 04/19/24    Discharge date: 04/19/24  Estimated blood loss: less than 10 cc  Complications: none  Condition at discharge: stable  Discharge instructions: no heavy lifting greater than 5 lbs and no bearing down/coughing without holding pressure over incision site.  Activity: as tolerated after 24 hrs  Diet: as tolerated  Dispo: home

## 2024-04-19 NOTE — PLAN OF CARE
From: Dionicio Mcdonald  To: Mora Salazar  Sent: 11/30/2021 3:22 PM CST  Subject: Test results    Sorry to bother to bother you. My throat has just been getting worse and i was wondering what the ultrasound results we're?   Patient arrived to room. Admit assessment completed. Plan of care discussed with patient. Will monitor. Call light within reach, instructed in use.

## 2024-04-19 NOTE — PLAN OF CARE
Patient discharged per MD orders. Instructions given on medications, wound care, activity, signs of infection, when to call MD, and follow up appointments. Pt verbalized understanding. Patient and family refused transport, ambulated off unit.

## 2024-04-19 NOTE — PLAN OF CARE
Received report from VARUN Luna. Patient s/p RHC, AAOx3. VSS, no c/o pain or discomfort at this time, resp even and unlabored. Gauze/tegaderm dressing to R neck is CDI. No active bleeding. No hematoma noted. Post procedure protocol reviewed with patient and patient's family. Understanding verbalized. Family members at bedside. Nurse call bell within reach. Will continue to monitor per post procedure protocol.

## 2024-04-22 ENCOUNTER — PATIENT MESSAGE (OUTPATIENT)
Dept: TRANSPLANT | Facility: CLINIC | Age: 69
End: 2024-04-22
Payer: COMMERCIAL

## 2024-04-23 ENCOUNTER — TELEPHONE (OUTPATIENT)
Dept: TRANSPLANT | Facility: CLINIC | Age: 69
End: 2024-04-23
Payer: COMMERCIAL

## 2024-04-23 ENCOUNTER — PATIENT MESSAGE (OUTPATIENT)
Dept: TRANSPLANT | Facility: CLINIC | Age: 69
End: 2024-04-23
Payer: COMMERCIAL

## 2024-04-24 DIAGNOSIS — J84.9 ILD (INTERSTITIAL LUNG DISEASE): Primary | ICD-10-CM

## 2024-05-04 NOTE — PROGRESS NOTES
Subjective:    Patient ID:  Catrachito Kraft is a 68 y.o. male who presents for follow-up of Pulmonary Hypertension.    HPI   Mr. Kraft was referred by Dr. العراقي ([facility]) with chronic hypoxemic respiratory failure secondary to progressive ILD due to systemic sclerosis (CREST) (on mycophenolate and OFEV) on home oxygen, COPD, prostate cancer with radiation induced neurogenic bladder, frequent UTI's, GERD and HF, AAA (no rupture, monitored), former smoker quit in 2005. Hospitalized 3 weeks ago fro sepsis and UTI.  Denies liver, kidney, thyroid disease.     Mr. Kraft is here with his wife. He is wearing 4L O2 via NC. He reports SOB, that has progressively worsened, such that he feels it with minimal exertion. He sometimes wears O2 at night. Uses 2 pillows to sleep and has not been tested for sleep apnea. He denies fluid retention, although sometimes feels full in his stomach. He endorses chest tightness and palpitations when he is SOB. Mr. Kraft reports some low BPs with accompanying symptoms of dizziness and nausea. He was recently switched from low dose nifedipine to amlodipine for his Raynaud's, but doesn't think the pressures are different - always seem low sometimes. He is very worried about his increased oxygen dependence and that he feels SOB with minimal effort.    He notes that he was diagnosed with scleroderma, Raynauds, and CPFE at about the same time in 2021. Follows closely with Dr. العراقي ([facility]). Has a pulm, cards, and been seen by [facility].    4/19/2024: Mr. Kraft is here following a right heart cath. He denies new symptoms since his clinic visit. We discussed the results and treatment options. Patient denies chest pain, chest pressure, syncope, pre-syncope, lightheadedness, dizziness, PND, orthopnea, LE edema, abdominal pain, abdominal pressure, or N/V/F/C      PH RISK FACTORS YES NO COMMENT   Anorexigens     [] [x]    Connective Tissue Disease:  [x] []    Raynauds:  [x] []     DVT/PE:  [] [x]    On anticoagulation:  [] [x]    HIV:  [] []    Left Heart Disease: [] [x]    COPD:  [x] []    On oxygen:  [x] []    Kidney Disease:  [] [x]    Liver Disease:  [] [x]    Thyroid Disease: [] [x]    On Thyroid replacement:  [] [x]    Obesity:  [] [x]    Sleep Apnea: [] [x] Not tested   On CPAP:  [] [x]      6MWT:   1/29/2024 3/18/2024   6MW     6MWT Status completed without stopping  completed without stopping    Patient Reported Dyspnea;Lightheadedness  Lightheadedness    Was O2 used? Yes  Yes    Delivery Method Cannula;Pull Tank;Continuous Flow  Cannula;Pull Tank;Continuous Flow    6MW Distance walked (feet) 1250 feet  1300 feet    Distance walked (meters) 381 meters  396.24 meters    Did patient stop? No  No    Oxygen Saturation 99 %  98 %    Supplemental Oxygen 4 L/M  4 L/M    Heart Rate 85 bpm  87 bpm    Blood Pressure 123/81  119/79    Tanvi Dyspnea Rating  very light  nothing at all    Oxygen Saturation 87 %  89 %    Supplemental Oxygen 4 L/M  4 L/M    Heart Rate 128 bpm  111 bpm    Blood Pressure 135/86  117/67    Tanvi Dyspnea Rating  heavy  moderate    Recovery Time (seconds) 92 seconds  70 seconds    Oxygen Saturation 98 %  97 %    Supplemental Oxygen 4 L/M  4 L/M    Heart Rate 92 bpm  97 bpm        ECHO: 11/14/2023  Final Impressions   1. The study quality is good.   2. Global left ventricular systolic function is normal. The left   ventricular ejection fraction is 55-60%.   3. A bubble study was performed to rule out patent foramen ovale. The   bubble study was negative, ruling out patent foramen ovale.    4. Mild calcification of the mitral valve is noted. Mild (1+) mitral   regurgitation.   5. Trace aortic regurgitation. Trace tricuspid regurgitation. Trace   pulmonic regurgitation.    6. The pulmonary artery systolic pressure is 29 mmHg.     Intra-modality comparison (Echocardiogram)   This echocardiogram when compared with the latest echocardiogram-TTE   ([facility]) dated 12/6/2022  shows:    1. Ejection fraction essentially remained unchanged (55% previous   study, 68% current study).    2. Mitral valve area by pressure halftime has decreased from 4.9 cm?   to 3.7 cm?.      STRESS ECHO:   1.Stress EKG is normal. 2.The heart rate recovery is normal. 1.This is probably a normal perfusion study. 2.This scan is suggestive of low to moderate risk for future cardiovascular events. 3.Small fixed perfusion abnormality of mild intensity in the inferior lateral region. Consider Diaphramatic attenuation4.The left ventricular cavity is noted to be normal on the stress study. The left ventricular ejection fraction was calculated to be 63% and left ventricular global function is normal. 5.The study quality is excellent.     RHC: 4/19/2024  RA: 8/ 4/ 3 RV: 66/ 0/ 3 PA: 66/ 23/ 37 PWP: 6/ 6/ 5 .   Cardiac output was 3.65 by Love. Cardiac index is 2.03 L/min/m2.   O2 Sat: PA 63%.   Thermodilution CO/CI 3.64/2.03  Mildly reduced CO/CI on no pulmonary vasodilator therapy.  Low normal right and low left sided filling pressure.  Moderate pulmonary hypertension.  TPG 32 PVR 8.76  SVR 2126 (map 100)    Nitric oxide study, at 20 ppm, 40 ppm, 80 ppm:  20ppm: 54/19 mPAP 31  40ppm: 48/18 mPAP 28  80ppm: 45/18 mPAP 27    Patient with 10 mm Hg decrease with nitric oxide from baseline PA pressures.     V/Q Scan: not done    PFTs:       1/29/2024     1:32 PM   Pulmonary Function Tests   FVC 3.89 liters   FEV1 3.25 liters   TLC (liters) 5 liters   DLCO (ml/mmHg sec) 6.02 ml/mmHg sec   FVC% 103   FEV1% 111.9   FEF 25-75 4.46   FEF 25-75% 191.1   TLC% 79.3   RV 1.12   RV% 45.8   DLCO% 24.7      Spirometry is normal. Lung volumes show mild restriction is present. Airway mechanics show normal airway resistance and conductance. DLCO is severely decreased.       CTA  FINDINGS:  No CT evidence of pulmonary thromboembolism.  Stable mildly enlarged bilateral hilar and mediastinal lymph nodes.  Stable fibrosis and emphysema.  Stable  "1.3 cm lobular left lower lobe nodule (axial 56).  Stable 7 mm right lower lobe nodule (axial 56).  No new suspicious nodule or mass.  No pleural or pericardial fluid.  Dilated fluid-filled esophagus consistent with scleroderma.     Impression:     1. No evidence of pulmonary thromboembolism.  2. Stable fibrosis, emphysema, and lymph node enlargement.  3. Stable bilateral lower lobe nodules.  4. Dilated fluid-filled esophagus, consistent with scleroderma.     Review of Systems   Constitutional: Positive for malaise/fatigue and weight loss.   Respiratory:  Positive for shortness of breath. Negative for wheezing.    Endocrine: Negative.    Hematologic/Lymphatic: Negative.    Musculoskeletal: Negative.    Gastrointestinal:  Positive for bloating.   Neurological: Negative.         Objective: /79 (BP Location: Left arm, Patient Position: Sitting, BP Method: Medium (Automatic))   Pulse 80   Ht 5' 7" (1.702 m)   Wt 68.8 kg (151 lb 10.8 oz)   BMI 23.76 kg/m²       Physical Exam  Constitutional:       General: He is not in acute distress.     Appearance: Normal appearance. He is ill-appearing.   HENT:      Head: Normocephalic.   Cardiovascular:      Rate and Rhythm: Normal rate and regular rhythm.      Pulses: Normal pulses.      Heart sounds: Normal heart sounds.   Pulmonary:      Comments: Wearing O2 and small concentrator. Uses 4L with exertion.  Musculoskeletal:         General: Normal range of motion.      Cervical back: Normal range of motion.   Skin:     General: Skin is dry.      Capillary Refill: Capillary refill takes less than 2 seconds.   Neurological:      Mental Status: He is oriented to person, place, and time.   Psychiatric:         Mood and Affect: Mood normal.         Behavior: Behavior normal.           Lab Results   Component Value Date     (H) 04/19/2024     04/19/2024    K 4.0 04/19/2024    MG 2.0 03/05/2024     04/19/2024    CO2 24 04/19/2024    BUN 19 04/19/2024    " CREATININE 1.2 04/19/2024    GLU 91 04/19/2024    AST 14 04/19/2024    ALT 13 04/19/2024    ALBUMIN 3.7 04/19/2024    PROT 6.9 04/19/2024    BILITOT 0.6 04/19/2024       Magnesium   Date Value Ref Range Status   03/05/2024 2.0 1.6 - 2.6 mg/dL Final       Lab Results   Component Value Date    WBC 11.65 04/19/2024    HGB 14.6 04/19/2024    HCT 46.2 04/19/2024    MCV 92 04/19/2024     04/19/2024       Lab Results   Component Value Date    INR 1.0 03/01/2024    INR 1.0 03/26/2019         Assessment:       1. Chronic respiratory failure with hypoxia    2. ILD (interstitial lung disease)    3. Post-prostate cancer treatment related neurogenic bladder associated chronic catheter dependent    4. Systemic sclerosis with lung involvement         Plan:     Mr. Kraft has moderate pulmonary arterial hypertension confirmed by right heart cath. Based on hemodynamics and history it is WHO group 1, in the setting of CTD, out of proportion to Group 3.  He reports WHO functional class III symptoms.     Plan will be to start Tyvaso and add PDE5i and ERA. Once he has started Tyvaso, will consider Opsynvi - combination tadalafil/Opsumit.    Provided education in clinic on treatment options. Answered all questions and concerns.    Please call Rukhsana at [phone] with any questions.      Ximena Titus DNP, APRN  Pulmonary Hypertension Department    [facility]  Dr. Marshall Titus DNP, BHUMI Romo, IGNACIAN, RN

## 2024-05-06 ENCOUNTER — TELEPHONE (OUTPATIENT)
Dept: TRANSPLANT | Facility: CLINIC | Age: 69
End: 2024-05-06
Payer: COMMERCIAL

## 2024-05-06 NOTE — TELEPHONE ENCOUNTER
Unable to reach patient's wife to discuss PA requirement for Tyvaso DPI.     PA request completed, pending insurance approval at this time.    ----- Message from Marie Whitaker sent at 5/6/2024  1:27 PM CDT -----  Regarding: Prior Auth  Julianne [phone] pt wife says the pharmacy is needing a Prior Auth for his Tyvaso DPI    [facility] [address]   Phone: [phone]  Fax: [phone]      Thanks

## 2024-05-17 ENCOUNTER — PATIENT MESSAGE (OUTPATIENT)
Dept: TRANSPLANT | Facility: CLINIC | Age: 69
End: 2024-05-17
Payer: COMMERCIAL

## 2024-05-17 RX ORDER — TADALAFIL 20 MG/1
20 TABLET ORAL DAILY
Qty: 60 TABLET | Refills: 11 | Status: SHIPPED | OUTPATIENT
Start: 2024-05-17

## 2024-05-23 ENCOUNTER — PATIENT MESSAGE (OUTPATIENT)
Dept: TRANSPLANT | Facility: CLINIC | Age: 69
End: 2024-05-23
Payer: COMMERCIAL

## 2024-05-24 NOTE — TELEPHONE ENCOUNTER
"Patient called NN to discuss tyvaso. NN explained that patient must first try the nebulizer before being able to transition to inhaler. Patient verbalized understanding. Patient also mentioned that he started tadalafil Wednesday night and agin Thursday morning. He felt great but when he walked outside and was in the sun he "went blind" in the left eye "like taking an eraser cap and placing it in the center." Patient states that it went away after he was inside. When he took the medication this morning, it happened again but once he is inside, his vision returns to normal. NN consulted with Dr. Whitmore who recommended that patient stop tadalafil and see an ophthalmologist to rule out non-arteritic anterior ischemic optic neuropathy associated with PDE-5 inhibitors. Patient verbalized understanding and states that he has an ophthalmologist and will call to get an appointment. SIS Davison will be notified and next steps will be discussed. Patient also confirmed that he had stopped taking nifedipine months ago and then was prescribed amlodipine but he stopped it 2 days ago.   "

## 2024-05-30 ENCOUNTER — TELEPHONE (OUTPATIENT)
Dept: TRANSPLANT | Facility: CLINIC | Age: 69
End: 2024-05-30
Payer: COMMERCIAL

## 2024-05-30 NOTE — TELEPHONE ENCOUNTER
Approved on May 29  Your PA request has been approved. Additional information will be provided in the approval communication. Tyvaso Neb  5/29/24-5/29/25

## 2024-06-03 PROBLEM — J96.11 CHRONIC RESPIRATORY FAILURE WITH HYPOXIA: Status: RESOLVED | Noted: 2024-03-02 | Resolved: 2024-06-03

## 2024-06-03 PROBLEM — R65.20 SEVERE SEPSIS: Status: RESOLVED | Noted: 2024-03-01 | Resolved: 2024-06-03

## 2024-06-03 PROBLEM — N39.0 UTI (URINARY TRACT INFECTION) DUE TO URINARY INDWELLING CATHETER: Status: RESOLVED | Noted: 2024-03-02 | Resolved: 2024-06-03

## 2024-06-03 PROBLEM — A41.9 SEVERE SEPSIS: Status: RESOLVED | Noted: 2024-03-01 | Resolved: 2024-06-03

## 2024-06-03 PROBLEM — T83.511A UTI (URINARY TRACT INFECTION) DUE TO URINARY INDWELLING CATHETER: Status: RESOLVED | Noted: 2024-03-02 | Resolved: 2024-06-03

## 2024-06-05 ENCOUNTER — TELEPHONE (OUTPATIENT)
Dept: TRANSPLANT | Facility: CLINIC | Age: 69
End: 2024-06-05
Payer: COMMERCIAL

## 2024-06-06 ENCOUNTER — HOSPITAL ENCOUNTER (OUTPATIENT)
Dept: PULMONOLOGY | Facility: CLINIC | Age: 69
Discharge: HOME | End: 2024-06-06
Payer: COMMERCIAL

## 2024-06-06 ENCOUNTER — OFFICE VISIT (OUTPATIENT)
Dept: TRANSPLANT | Facility: CLINIC | Age: 69
End: 2024-06-06
Payer: COMMERCIAL

## 2024-06-06 ENCOUNTER — LAB VISIT (OUTPATIENT)
Dept: LAB | Facility: HOSPITAL | Age: 69
End: 2024-06-06
Payer: COMMERCIAL

## 2024-06-06 VITALS
WEIGHT: 151.25 LBS | BODY MASS INDEX: 24.31 KG/M2 | HEART RATE: 75 BPM | OXYGEN SATURATION: 98 % | HEIGHT: 66 IN | WEIGHT: 151.25 LBS | BODY MASS INDEX: 24.31 KG/M2 | DIASTOLIC BLOOD PRESSURE: 75 MMHG | HEIGHT: 66 IN | SYSTOLIC BLOOD PRESSURE: 122 MMHG | RESPIRATION RATE: 20 BRPM

## 2024-06-06 DIAGNOSIS — J96.11 CHRONIC RESPIRATORY FAILURE WITH HYPOXIA: ICD-10-CM

## 2024-06-06 DIAGNOSIS — J84.10 COMBINED PULMONARY FIBROSIS AND EMPHYSEMA (CPFE): ICD-10-CM

## 2024-06-06 DIAGNOSIS — J84.9 ILD (INTERSTITIAL LUNG DISEASE): ICD-10-CM

## 2024-06-06 DIAGNOSIS — J43.9 COMBINED PULMONARY FIBROSIS AND EMPHYSEMA (CPFE): ICD-10-CM

## 2024-06-06 DIAGNOSIS — I27.20 PULMONARY HYPERTENSION: ICD-10-CM

## 2024-06-06 DIAGNOSIS — J84.9 ILD (INTERSTITIAL LUNG DISEASE): Primary | ICD-10-CM

## 2024-06-06 DIAGNOSIS — M34.81 SYSTEMIC SCLEROSIS WITH LUNG INVOLVEMENT: ICD-10-CM

## 2024-06-06 LAB
ALBUMIN SERPL BCP-MCNC: 3.6 G/DL (ref 3.5–5.2)
ALP SERPL-CCNC: 166 U/L (ref 55–135)
ALT SERPL W/O P-5'-P-CCNC: 11 U/L (ref 10–44)
ANION GAP SERPL CALC-SCNC: 11 MMOL/L (ref 8–16)
AST SERPL-CCNC: 14 U/L (ref 10–40)
BASOPHILS # BLD AUTO: 0.02 K/UL (ref 0–0.2)
BASOPHILS NFR BLD: 0.2 % (ref 0–1.9)
BILIRUB SERPL-MCNC: 0.6 MG/DL (ref 0.1–1)
BUN SERPL-MCNC: 18 MG/DL (ref 8–23)
CALCIUM SERPL-MCNC: 9.6 MG/DL (ref 8.7–10.5)
CHLORIDE SERPL-SCNC: 107 MMOL/L (ref 95–110)
CO2 SERPL-SCNC: 23 MMOL/L (ref 23–29)
CREAT SERPL-MCNC: 1.1 MG/DL (ref 0.5–1.4)
DIFFERENTIAL METHOD BLD: ABNORMAL
DLCO SINGLE BREATH LLN: 17.48
DLCO SINGLE BREATH PRE REF: 23.1 %
DLCO SINGLE BREATH REF: 24.4
DLCOC SBVA LLN: 2.56
DLCOC SBVA REF: 3.86
DLCOC SINGLE BREATH LLN: 17.48
DLCOC SINGLE BREATH REF: 24.4
DLCOCSBVAULN: 5.17
DLCOCSINGLEBREATHULN: 31.33
DLCOSINGLEBREATHULN: 31.33
DLCOVA LLN: 2.56
DLCOVA PRE REF: 31.9 %
DLCOVA PRE: 1.23 ML/(MIN*MMHG*L) (ref 2.56–5.17)
DLCOVA REF: 3.86
DLCOVAULN: 5.17
EOSINOPHIL # BLD AUTO: 0.1 K/UL (ref 0–0.5)
EOSINOPHIL NFR BLD: 1 % (ref 0–8)
ERV LLN: -16449
ERV PRE REF: 75.7 %
ERV REF: 1
ERVULN: ABNORMAL
ERYTHROCYTE [DISTWIDTH] IN BLOOD BY AUTOMATED COUNT: 14.2 % (ref 11.5–14.5)
EST. GFR  (NO RACE VARIABLE): >60 ML/MIN/1.73 M^2
FEF 25 75 LLN: 1.04
FEF 25 75 PRE REF: 217.6 %
FEF 25 75 REF: 2.32
FEV05 LLN: 1.18
FEV05 REF: 2.32
FEV1 FVC LLN: 64
FEV1 FVC PRE REF: 110.8 %
FEV1 FVC REF: 77
FEV1 LLN: 2.11
FEV1 PRE REF: 115.3 %
FEV1 REF: 2.89
FRCPLETH LLN: 2.45
FRCPLETH PREREF: 72.8 %
FRCPLETH REF: 3.44
FRCPLETHULN: 4.42
FVC LLN: 2.81
FVC PRE REF: 103.9 %
FVC REF: 3.76
GLUCOSE SERPL-MCNC: 76 MG/DL (ref 70–110)
HCT VFR BLD AUTO: 42.9 % (ref 40–54)
HGB BLD-MCNC: 13.5 G/DL (ref 14–18)
IMM GRANULOCYTES # BLD AUTO: 0.06 K/UL (ref 0–0.04)
IMM GRANULOCYTES NFR BLD AUTO: 0.6 % (ref 0–0.5)
IVC PRE: 3.75 L (ref 2.81–4.73)
IVC SINGLE BREATH LLN: 2.81
IVC SINGLE BREATH PRE REF: 99.6 %
IVC SINGLE BREATH REF: 3.76
IVCSINGLEBREATHULN: 4.73
LLN IC: -9999997.3
LYMPHOCYTES # BLD AUTO: 0.7 K/UL (ref 1–4.8)
LYMPHOCYTES NFR BLD: 7.5 % (ref 18–48)
MCH RBC QN AUTO: 29.2 PG (ref 27–31)
MCHC RBC AUTO-ENTMCNC: 31.5 G/DL (ref 32–36)
MCV RBC AUTO: 93 FL (ref 82–98)
MONOCYTES # BLD AUTO: 0.7 K/UL (ref 0.3–1)
MONOCYTES NFR BLD: 7 % (ref 4–15)
NEUTROPHILS # BLD AUTO: 7.8 K/UL (ref 1.8–7.7)
NEUTROPHILS NFR BLD: 83.7 % (ref 38–73)
NRBC BLD-RTO: 0 /100 WBC
PEF LLN: 5.71
PEF PRE REF: 142.5 %
PEF REF: 7.77
PHYSICIAN COMMENT: ABNORMAL
PLATELET # BLD AUTO: 239 K/UL (ref 150–450)
PMV BLD AUTO: 12.2 FL (ref 9.2–12.9)
POTASSIUM SERPL-SCNC: 3.8 MMOL/L (ref 3.5–5.1)
PRE DLCO: 5.64 ML/(MIN*MMHG) (ref 17.48–31.33)
PRE ERV: 0.75 L (ref -16449–16451)
PRE FEF 25 75: 5.04 L/S (ref 1.04–4.1)
PRE FET 100: 5.71 SEC
PRE FEV05 REF: 128 %
PRE FEV1 FVC: 85.35 % (ref 63.83–88.66)
PRE FEV1: 3.34 L (ref 2.11–3.63)
PRE FEV5: 2.97 L (ref 1.18–3.45)
PRE FRC PL: 2.5 L (ref 2.45–4.42)
PRE FVC: 3.91 L (ref 2.81–4.73)
PRE IC: 3.15 L (ref -9999997.3–#######.####)
PRE PEF: 11.07 L/S (ref 5.71–9.83)
PRE REF IC: 116.7 %
PRE RV: 1.75 L (ref 1.77–3.11)
PRE TLC: 5.66 L (ref 5.16–7.47)
PROT SERPL-MCNC: 6.6 G/DL (ref 6–8.4)
RAW PRE REF: 122.9 %
RAW PRE: 3.76 CMH2O*S/L (ref 3.06–3.06)
RAW REF: 3.06
RBC # BLD AUTO: 4.63 M/UL (ref 4.6–6.2)
REF IC: 2.7
RV LLN: 1.77
RV PRE REF: 71.6 %
RV REF: 2.44
RVTLC LLN: 31
RVTLC PRE REF: 77 %
RVTLC PRE: 30.89 % (ref 31.11–49.07)
RVTLC REF: 40
RVTLCULN: 49
RVULN: 3.11
SGAW PRE REF: 89.6 %
SGAW PRE: 0.07 1/(CMH2O*S) (ref 0.08–0.08)
SGAW REF: 0.08
SODIUM SERPL-SCNC: 141 MMOL/L (ref 136–145)
TLC LLN: 5.16
TLC PRE REF: 89.6 %
TLC REF: 6.31
TLC ULN: 7.47
ULN IC: ABNORMAL
VA PRE: 4.57 L (ref 6.16–6.16)
VA SINGLE BREATH LLN: 6.16
VA SINGLE BREATH PRE REF: 74.1 %
VA SINGLE BREATH REF: 6.16
VASINGLEBREATHULN: 6.16
VC LLN: 2.81
VC PRE REF: 103.9 %
VC PRE: 3.91 L (ref 2.81–4.73)
VC REF: 3.76
VC ULN: 4.73
WBC # BLD AUTO: 9.29 K/UL (ref 3.9–12.7)

## 2024-06-06 PROCEDURE — 3008F BODY MASS INDEX DOCD: CPT | Mod: CPTII,NTX,S$GLB, | Performed by: PHYSICIAN ASSISTANT

## 2024-06-06 PROCEDURE — 3074F SYST BP LT 130 MM HG: CPT | Mod: CPTII,NTX,S$GLB, | Performed by: PHYSICIAN ASSISTANT

## 2024-06-06 PROCEDURE — 36415 COLL VENOUS BLD VENIPUNCTURE: CPT | Mod: TXP | Performed by: PHYSICIAN ASSISTANT

## 2024-06-06 PROCEDURE — 80053 COMPREHEN METABOLIC PANEL: CPT | Mod: TXP | Performed by: PHYSICIAN ASSISTANT

## 2024-06-06 PROCEDURE — 1157F ADVNC CARE PLAN IN RCRD: CPT | Mod: CPTII,NTX,S$GLB, | Performed by: PHYSICIAN ASSISTANT

## 2024-06-06 PROCEDURE — 1125F AMNT PAIN NOTED PAIN PRSNT: CPT | Mod: CPTII,NTX,S$GLB, | Performed by: PHYSICIAN ASSISTANT

## 2024-06-06 PROCEDURE — 99214 OFFICE O/P EST MOD 30 MIN: CPT | Mod: 25,NTX,S$GLB, | Performed by: PHYSICIAN ASSISTANT

## 2024-06-06 PROCEDURE — 85025 COMPLETE CBC W/AUTO DIFF WBC: CPT | Mod: NTX | Performed by: PHYSICIAN ASSISTANT

## 2024-06-06 PROCEDURE — 3288F FALL RISK ASSESSMENT DOCD: CPT | Mod: CPTII,NTX,S$GLB, | Performed by: PHYSICIAN ASSISTANT

## 2024-06-06 PROCEDURE — 1160F RVW MEDS BY RX/DR IN RCRD: CPT | Mod: CPTII,NTX,S$GLB, | Performed by: PHYSICIAN ASSISTANT

## 2024-06-06 PROCEDURE — 3078F DIAST BP <80 MM HG: CPT | Mod: CPTII,NTX,S$GLB, | Performed by: PHYSICIAN ASSISTANT

## 2024-06-06 PROCEDURE — 94618 PULMONARY STRESS TESTING: CPT | Mod: NTX,S$GLB,, | Performed by: INTERNAL MEDICINE

## 2024-06-06 PROCEDURE — 1101F PT FALLS ASSESS-DOCD LE1/YR: CPT | Mod: CPTII,NTX,S$GLB, | Performed by: PHYSICIAN ASSISTANT

## 2024-06-06 PROCEDURE — 99999 PR PBB SHADOW E&M-EST. PATIENT-LVL V: CPT | Mod: PBBFAC,TXP,, | Performed by: PHYSICIAN ASSISTANT

## 2024-06-06 PROCEDURE — 1159F MED LIST DOCD IN RCRD: CPT | Mod: CPTII,NTX,S$GLB, | Performed by: PHYSICIAN ASSISTANT

## 2024-06-06 NOTE — PROCEDURES
Catrachito Kraft is a 68 y.o.  male patient, who presents for a 6 minute walk test ordered by MD Ken.  The diagnosis is Interstitial Lung Disease; Connective Tissue Disease.  The patient's BMI is 24.4 kg/m2.  Predicted distance (lower limit of normal) is 385.14 meters.      Test Results:    The test was completed without stopping.  The total time walked was 360 seconds.  During walking, the patient reported:  Dyspnea, Lightheadedness.  The patient used supplemental oxygen during testing.  Oxygen saturation was measured with forehead pulse oximetry probe.     06/06/2024---------Distance: 365.76 meters (1200 feet)     O2 Sat % Supplemental Oxygen Heart Rate Blood Pressure Tanvi Scale Comments   Pre-exercise  (Resting) 100 % 4 L/M 76 bpm 109/72 mmHg 0 SpO2 by forehead pulse oximetry.   During Exercise 84 % 4 L/M 98 bpm 119/81 mmHg 5-6    Post-exercise  (Recovery) 95 % 4 L/M  87 bpm        Recovery Time: 78 seconds    Performing nurse/tech: Leonela CHOU      PREVIOUS STUDY:   03/18/2024---------Distance: 396.24 meters (1300 feet)       O2 Sat % Supplemental Oxygen Heart Rate Blood Pressure Tanvi Scale Comments   Pre-exercise  (Resting) 98 % 4 L/M 87 bpm 119/79 mmHg 0 Forehead pulse oximetry probe used.   During Exercise 89 % 4 L/M 111 bpm 117/67 mmHg 3     Post-exercise  (Recovery) 97 % 4 L/M  97 bpm             CLINICAL INTERPRETATION:  Six minute walk distance is 365.76 meters (1200 feet) with heavy dyspnea.  During exercise, there was significant desaturation while breathing supplemental oxygen.  Blood pressure remained stable and Heart rate increased significantly with walking.  The patient reported non-pulmonary symptoms during exercise.  Since the previous study in March 2024, exercise capacity is unchanged.  Based upon age and body mass index, exercise capacity is less than predicted.

## 2024-06-06 NOTE — PROGRESS NOTES
ADVANCED LUNG DISEASE INITIAL EVALUATION                                                                                                                                             Reason for Visit:  Evaluation for CPFE    Referring Physician: Addy Rogers NP    History of Present Illness: Catrachito Kraft is a 68 y.o. male who is on 4L of oxygen.  He is on no assisted ventilation.  His New York Heart Association Class is III and a Karnofsky score of 70% - Cares for self: Unable to carry on normal activity or active work. He is not diabetic.    Requires Supplemental O2: Yes.  With exercise: Nasal cannula - 4 L/min.    Massive Hemoptysis: 0 occurrences  (Enter the number of times in the last year)    Exacerbations: 0 occurrences  (Enter the number of times in the last year)    Microbiology Infections: No    Patient presents today for routine follow up. Reports progression of his dyspnea since his last office visit. He reports poor quality of life. Has to take frequent breaks performing his ADLs due to dyspnea. No recent hospitalizations/exacerbations. Last hospitalized in early March for sepsis 2/2 UTI. He admits he is more inactive due to his anxiety with his breathing. Not currently enrolled in pulmonary rehab. On MMF and OFEV and tolerating well. Underwent RHC and started on Adcirca/Tyvaso for his PH. States he had to stop his CCB for raynaud's due to hypotension.     PER INITIAL HPI:     Patient is a former smoker with a 62 pack year history and quit in 2005. He has a history of GERD which is well controlled on PPI and H2B therapy. He was diagnosed with prostate cancer in 2019 and subsequently underwent radical protatectomy and 38 rounds of radiation. Since then, he has numerous episodes of melena, BRBPR, urinary incontinence/retention and UTIs. He states he was maintained on macrobid therapy for an entire year due to need for indwelling catheter and colonization. He states he used to cycle to stay active prior  to 2019, but has been unable to do it due to pain. He takes CBD gummies to help with his chronic rectal/urinary pain. He will occasionally use narcotics for pain relief, but often has more bleeding with Bms due to straining. +CREST, as he notes raynauds, some skin tightening, and esophageal dysmotility associated with his scleroderma.     Patient states he was instructed to start oxygen to be used with exertion, but only just recently started using 4L. He states his dyspnea was slowly progressive after his initial diagnosis, but has worsening rapidly over the last few months. He now requires frequent breaks while performing his ADLs. He admits to a more sedentary lifestyle since receiving his diagnosis and states he is scared to become short of breath. Patient presents to the clinic today with his wife. He currently lives in [place]. He previously worked offshore, as a , and has a history of occupational exposures.       Past Medical History:   Diagnosis Date    Abdominal aortic aneurysm without rupture     Abdominal pain 2020    Asthma     Colon polyps     COPD (chronic obstructive pulmonary disease)     Elevated cholesterol     GERD (gastroesophageal reflux disease)     Hematochezia     Hiatal hernia     History of radiation therapy     Hypercholesteremia     Hypertension     PONV (postoperative nausea and vomiting)     x1    Presence of indwelling urinary catheter 11/17/2020    Proctitis, radiation     Prostate cancer 2019    radiation    Rectal bleeding     Rectal bleeding 2020    due to radiation treatment for prostate cancer,  having laser procedures to treat     Reflux esophagitis     Renal disorder     uti    Systemic sclerosis     Urinary incontinence     Vertigo     Wears glasses        Past Surgical History:   Procedure Laterality Date    APPENDECTOMY      COLONOSCOPY N/A 12/4/2017    Procedure: HIGH RISK SCREENING COLONOSCOPY;  Surgeon: Lorne Hair MD;  Location: [facility];   Service: Endoscopy;  Laterality: N/A;    COLONOSCOPY N/A 1/24/2020    Procedure: COLONOSCOPY;  Surgeon: Lorne Hair MD;  Location: [facility] ENDO;  Service: Endoscopy;  Laterality: N/A;    COLONOSCOPY N/A 9/21/2023    Procedure: COLONOSCOPY;  Surgeon: Lorne Hair MD;  Location: [facility] ENDO;  Service: Endoscopy;  Laterality: N/A;    CYSTOSCOPY  12/14/2020    Procedure: CYSTOSCOPY;  Surgeon: Aki El MD;  Location: [facility] OR;  Service: Urology;;    CYSTOSCOPY W/ RETROGRADES Bilateral 3/11/2020    Procedure: CYSTOSCOPY WITH RETROGRADE PYELOGRAM;  Surgeon: Aki El MD;  Location: [facility] OR;  Service: Urology;  Laterality: Bilateral;    CYSTOSCOPY W/ RETROGRADES Bilateral 6/16/2022    Procedure: CYSTOSCOPY WITH RETROGRADE PYELOGRAM;  Surgeon: Aki El MD;  Location: [facility] OR;  Service: Urology;  Laterality: Bilateral;    DILATION OF URETHRA N/A 3/11/2020    Procedure: DILATION, URETHRAL STRICTURE;  Surgeon: Aki El MD;  Location: [facility] OR;  Service: Urology;  Laterality: N/A;    DILATION OF URETHRA  11/20/2020    Procedure: DILATION, URETHRA;  Surgeon: Aki El MD;  Location: [facility] OR;  Service: Urology;;    EYE SURGERY Bilateral     cataract    FLEXIBLE SIGMOIDOSCOPY N/A 2/14/2020    Procedure: SIGMOIDOSCOPY, FLEXIBLE/apc;  Surgeon: Amol Telles MD;  Location: [facility] (2ND FLR);  Service: Endoscopy;  Laterality: N/A;  out-pt blood work 2/4/20-tb    FLEXIBLE SIGMOIDOSCOPY N/A 6/26/2020    Procedure: SIGMOIDOSCOPY, FLEXIBLE/apc;  Surgeon: Amol Telles MD;  Location: [facility] ENDO (2ND FLR);  Service: Endoscopy;  Laterality: N/A;  Covid-19 test 6/24/20 at [facility]  -     FLEXIBLE SIGMOIDOSCOPY N/A 8/12/2020    Procedure: SIGMOIDOSCOPY, FLEXIBLE;  Surgeon: Amol Telles MD;  Location: [facility] ENDO (2ND FLR);  Service: Endoscopy;  Laterality: N/A;  Covid-19 test 8/9/20 at [facility]  - pg    PROSTATE BIOPSY      RADICAL RETROPUBIC PROSTATECTOMY N/A  4/8/2019    Procedure: PROSTATECTOMY-RADICAL-RETROPUBIC using Harmonic scalpel;  Surgeon: Aki El MD;  Location: [facility] OR;  Service: Urology;  Laterality: N/A;    RIGHT HEART CATHETERIZATION Right 4/19/2024    Procedure: INSERTION, CATHETER, RIGHT HEART;  Surgeon: Inga Durán MD;  Location: [facility] CATH LAB;  Service: Cardiology;  Laterality: Right;       Allergies: Adhesive tape-silicones    Current Outpatient Medications   Medication Sig    albuterol (PROVENTIL/VENTOLIN HFA) 90 mcg/actuation inhaler Inhale 2 puffs into the lungs every 6 (six) hours as needed for Shortness of Breath or Wheezing.    amLODIPine (NORVASC) 2.5 MG tablet Take 2.5 mg by mouth once daily.    aspirin (ECOTRIN) 81 MG EC tablet Take 81 mg by mouth once daily.    atorvastatin (LIPITOR) 40 MG tablet Take 40 mg by mouth every evening.     cholecalciferol, vitamin D3, 125 mcg (5,000 unit) Tab Vitamin D3 125 mcg (5,000 unit) tablet, [RxNorm: 622826]    ciprofloxacin HCl (CIPRO) 500 MG tablet Take 1 tablet (500 mg total) by mouth every evening.    cyclobenzaprine (FLEXERIL) 10 MG tablet Take 10 mg by mouth 3 (three) times daily as needed for Muscle spasms.    esomeprazole (NEXIUM) 40 MG capsule Take 40 mg by mouth 2 (two) times daily before meals.    fluorouraciL (EFUDEX) 5 % cream APPLY 1 APPLICATION ON THE SKIN NIGHTLY FOR 1 - 2 WEEKS.    fluticasone-umeclidin-vilanter (TRELEGY ELLIPTA) 100-62.5-25 mcg DsDv Inhale 1 puff into the lungs once daily.    HYDROcodone-acetaminophen (NORCO) 5-325 mg per tablet Take 1 tablet by mouth every 6 (six) hours as needed for Pain.    meclizine (ANTIVERT) 25 mg tablet Take 1 tablet (25 mg total) by mouth 3 (three) times daily as needed.    mycophenolate (CELLCEPT) 500 mg Tab Take 1,500 mg by mouth 2 (two) times daily.    NIFEdipine (ADALAT CC) 30 MG TbSR Take 30 mg by mouth once daily. On Hold    nintedanib (OFEV) 100 mg Cap Take 1 tablet by mouth 2 (two) times a day.    oxybutynin (DITROPAN) 5 MG Tab  Take 5 mg by mouth daily as needed.    tadalafil (ADCIRCA) 20 mg Tab Take 1 tablet (20 mg total) by mouth once daily. Take 1 tablet (20 mg) once daily for 2 weeks, then increase to 2 tablets, once daily.     No current facility-administered medications for this visit.     Facility-Administered Medications Ordered in Other Visits   Medication    0.9%  NaCl infusion       Immunization History   Administered Date(s) Administered    COVID-19, MRNA, LN-S, PF (Pfizer) (Purple Cap) 2021, 2021    Influenza - Quadrivalent - High Dose - PF (65 years and older) 10/27/2022, 10/14/2023    RSVpreF (Arexvy) 10/14/2023     Family History:    Family History   Problem Relation Name Age of Onset    Heart disease Mother      COPD Mother      Hyperlipidemia Mother      Kidney disease Mother      Heart disease Father      Kidney disease Father      Cancer Father          prostate    Colon polyps Father      Prostate cancer Father      Bladder Cancer Father       Social History     Substance and Sexual Activity   Alcohol Use Yes    Comment: once or twice a year      Social History     Substance and Sexual Activity   Drug Use Yes    Types: Marijuana    Comment: Marijuana - as needed - scare him when he takes it - doesn't feel the stress before he loses his breath.      Social History     Socioeconomic History    Marital status:    Tobacco Use    Smoking status: Former     Current packs/day: 0.00     Average packs/day: 2.5 packs/day for 25.0 years (62.5 ttl pk-yrs)     Types: Cigarettes     Start date:      Quit date:      Years since quittin.4     Passive exposure: Past    Smokeless tobacco: Never   Substance and Sexual Activity    Alcohol use: Yes     Comment: once or twice a year    Drug use: Yes     Types: Marijuana     Comment: Marijuana - as needed - scare him when he takes it - doesn't feel the stress before he loses his breath.     Social Determinants of Health     Financial Resource Strain: Low Risk   (3/4/2024)    Overall Financial Resource Strain (CARDIA)     Difficulty of Paying Living Expenses: Not hard at all   Food Insecurity: No Food Insecurity (3/4/2024)    Hunger Vital Sign     Worried About Running Out of Food in the Last Year: Never true     Ran Out of Food in the Last Year: Never true   Transportation Needs: No Transportation Needs (3/4/2024)    PRAPARE - Transportation     Lack of Transportation (Medical): No     Lack of Transportation (Non-Medical): No   Physical Activity: Insufficiently Active (11/13/2023)    Exercise Vital Sign     Days of Exercise per Week: 2 days     Minutes of Exercise per Session: 10 min   Stress: No Stress Concern Present (3/3/2024)    [employer] - Occupational Stress Questionnaire     Feeling of Stress : Only a little   Housing Stability: Low Risk  (3/4/2024)    Housing Stability Vital Sign     Unable to Pay for Housing in the Last Year: No     Number of Places Lived in the Last Year: 1     Unstable Housing in the Last Year: No     Review of Systems   Constitutional:  Negative for chills, diaphoresis, fever, malaise/fatigue and weight loss.   HENT:  Negative for congestion, ear discharge, ear pain, hearing loss, nosebleeds, sinus pain, sore throat and tinnitus.    Eyes:  Negative for blurred vision, double vision, photophobia, pain, discharge and redness.   Respiratory:  Positive for shortness of breath. Negative for cough, hemoptysis, sputum production, wheezing and stridor.    Cardiovascular:  Negative for chest pain, palpitations, orthopnea, claudication, leg swelling and PND.   Gastrointestinal:  Negative for abdominal pain, blood in stool, constipation, diarrhea, heartburn, melena, nausea and vomiting.   Genitourinary:  Negative for dysuria, flank pain, frequency, hematuria and urgency.   Musculoskeletal:  Negative for back pain, falls, joint pain, myalgias and neck pain.   Skin:  Negative for itching and rash.   Neurological:  Negative for  "dizziness, tingling, tremors, sensory change, speech change, focal weakness, seizures, loss of consciousness, weakness and headaches.   Endo/Heme/Allergies:  Negative for environmental allergies and polydipsia. Does not bruise/bleed easily.   Psychiatric/Behavioral:  Negative for depression, hallucinations, memory loss, substance abuse and suicidal ideas. The patient is not nervous/anxious and does not have insomnia.      Vitals  /75 (BP Location: Right arm, Patient Position: Sitting, BP Method: Medium (Automatic))   Pulse 75   Resp 20   Ht 5' 6" (1.676 m)   Wt 68.6 kg (151 lb 3.8 oz)   SpO2 98% Comment: 4 liters pulse  BMI 24.41 kg/m²   Physical Exam  Vitals and nursing note reviewed.   Constitutional:       General: He is not in acute distress.     Appearance: He is well-developed. He is not diaphoretic.   HENT:      Head: Normocephalic and atraumatic.      Nose: Nose normal.      Mouth/Throat:      Pharynx: No oropharyngeal exudate.   Eyes:      General: No scleral icterus.     Conjunctiva/sclera: Conjunctivae normal.      Pupils: Pupils are equal, round, and reactive to light.   Neck:      Thyroid: No thyromegaly.      Vascular: No JVD.   Cardiovascular:      Rate and Rhythm: Normal rate and regular rhythm.      Heart sounds: Normal heart sounds. No murmur heard.     No friction rub. No gallop.   Pulmonary:      Effort: Pulmonary effort is normal. No respiratory distress.      Breath sounds: No stridor. Rales present. No wheezing.   Abdominal:      General: Bowel sounds are normal. There is no distension.      Palpations: Abdomen is soft.      Tenderness: There is no abdominal tenderness.   Musculoskeletal:         General: Normal range of motion.      Cervical back: Normal range of motion and neck supple.   Skin:     General: Skin is warm and dry.      Findings: No erythema.   Neurological:      Mental Status: He is alert and oriented to person, place, and time.      Cranial Nerves: No cranial nerve " deficit.   Psychiatric:         Judgment: Judgment normal.       Labs:  Hospital Outpatient Visit on 06/06/2024   Component Date Value    Pre FVC 06/06/2024 3.91     PRE FEV5 06/06/2024 2.97     Pre FEV1 06/06/2024 3.34     Pre FEV1 FVC 06/06/2024 85.35     Pre FEF 25 75 06/06/2024 5.04 (H)     Pre PEF 06/06/2024 11.07 (H)     Pre  06/06/2024 5.71     Pre DLCO 06/06/2024 5.64 (L)     DLCOVA PRE 06/06/2024 1.23 (L)     VA PRE 06/06/2024 4.57 (L)     IVC PRE 06/06/2024 3.75     Pre TLC 06/06/2024 5.66     VC PRE 06/06/2024 3.91     PRE IC 06/06/2024 3.15     Pre FRC PL 06/06/2024 2.50     Pre ERV 06/06/2024 0.75     Pre RV 06/06/2024 1.75 (L)     RVTLC PRE 06/06/2024 30.89 (L)     Raw PRE 06/06/2024 3.76 (H)     sGaw PRE 06/06/2024 0.07 (L)     FVC Ref 06/06/2024 3.76     FVC LLN 06/06/2024 2.81     FVC Pre Ref 06/06/2024 103.9     FEV05 REF 06/06/2024 2.32     FEV05 LLN 06/06/2024 1.18     PRE FEV05 REF 06/06/2024 128.0     FEV1 Ref 06/06/2024 2.89     FEV1 LLN 06/06/2024 2.11     FEV1 Pre Ref 06/06/2024 115.3     FEV1 FVC Ref 06/06/2024 77     FEV1 FVC LLN 06/06/2024 64     FEV1 FVC Pre Ref 06/06/2024 110.8     FEF 25 75 Ref 06/06/2024 2.32     FEF 25 75 LLN 06/06/2024 1.04     FEF 25 75 Pre Ref 06/06/2024 217.6     PEF Ref 06/06/2024 7.77     PEF LLN 06/06/2024 5.71     PEF Pre Ref 06/06/2024 142.5     TLC Ref 06/06/2024 6.31     TLC LLN 06/06/2024 5.16     TLC ULN 06/06/2024 7.47     TLC Pre Ref 06/06/2024 89.6     VC Ref 06/06/2024 3.76     VC LLN 06/06/2024 2.81     VC ULN 06/06/2024 4.73     VC Pre Ref 06/06/2024 103.9     REF IC 06/06/2024 2.70     LLN IC 06/06/2024 -8459321.30     ULN IC 06/06/2024 88984318.70     PRE REF IC 06/06/2024 116.7     FRCpleth Ref 06/06/2024 3.44     FRCpleth LLN 06/06/2024 2.45     FRC PLETH ULN 06/06/2024 4.42     FRCpleth PreRef 06/06/2024 72.8     ERV Ref 06/06/2024 1.00     ERV LLN 06/06/2024 -71728.00     ERV ULN 06/06/2024 44002.00     ERV Pre Ref 06/06/2024 75.7      RV Ref 06/06/2024 2.44     RV LLN 06/06/2024 1.77     RV ULN 06/06/2024 3.11     RV Pre Ref 06/06/2024 71.6     RVTLC Ref 06/06/2024 40     RVTLC LLN 06/06/2024 31     RV TLC ULN 06/06/2024 49     RVTLC Pre Ref 06/06/2024 77.0     Raw Ref 06/06/2024 3.06     Raw Pre Ref 06/06/2024 122.9     sGaw Ref 06/06/2024 0.08     sGaw Pre Ref 06/06/2024 89.6     DLCO Single Breath Ref 06/06/2024 24.40     DLCO Single Breath LLN 06/06/2024 17.48     DLCO SINGLEBREATH ULN 06/06/2024 31.33     DLCO Single Breath Pre R* 06/06/2024 23.1     DLCOc Single Breath Ref 06/06/2024 24.40     DLCOc Single Breath LLN 06/06/2024 17.48     DLCOC SINGLEBREATH ULN 06/06/2024 31.33     DLCOVA Ref 06/06/2024 3.86     DLCOVA LLN 06/06/2024 2.56     DLCOVA ULN 06/06/2024 5.17     DLCOVA Pre Ref 06/06/2024 31.9     DLCOc SBVA Ref 06/06/2024 3.86     DLCOc SBVA LLN 06/06/2024 2.56     DLCOCSBVA ULN 06/06/2024 5.17     VA Single Breath Ref 06/06/2024 6.16     VA Single Breath LLN 06/06/2024 6.16     VA SINGLEBREATH ULN 06/06/2024 6.16     VA Single Breath Pre Ref 06/06/2024 74.1     IVC Single Breath Ref 06/06/2024 3.76     IVC Single Breath LLN 06/06/2024 2.81     IVC SINGLEBREATH ULN 06/06/2024 4.73     IVC Single Breath Pre Ref 06/06/2024 99.6            6/6/2024    12:52 PM 1/29/2024     1:32 PM   Pulmonary Function Tests   FVC 3.91 liters 3.89 liters   FEV1 3.34 liters 3.25 liters   TLC (liters) 5.66 liters 5 liters   DLCO (ml/mmHg sec) 5.64 ml/mmHg sec 6.02 ml/mmHg sec   FVC% 103.9 103   FEV1% 115.3 111.9   FEF 25-75 5.04 4.46   FEF 25-75% 217.6 191.1   TLC% 89.6 79.3   RV 1.75 1.12   RV% 71.6 45.8   DLCO% 23.1 24.7         6/6/2024    12:27 PM 3/18/2024    12:12 PM 1/29/2024    11:37 AM 11/8/2023     2:33 PM 12/5/2022     2:16 PM 6/21/2022     3:47 PM   6MW   6MWT Status completed without stopping completed without stopping completed without stopping completed without stopping completed without stopping completed with stops   Patient Reported  Dyspnea;Lightheadedness Lightheadedness Dyspnea;Lightheadedness Dyspnea Calf pain;Dizziness;Dyspnea;Leg pain;Lightheadedness;Other (Comment) Dyspnea;Lightheadedness   Was O2 used? Yes Yes Yes Yes No Yes   Delivery Method Cannula;Continuous Flow;Pull Tank Cannula;Pull Tank;Continuous Flow Cannula;Pull Tank;Continuous Flow Demand Flow  Demand Flow;Cannula   6MW Distance walked (feet) 1200 feet 1300 feet 1250 feet 1550 feet 1100 feet 700 feet   Distance walked (meters) 365.76 meters 396.24 meters 381 meters 472.44 meters 335.28 meters 213.36 meters   Did patient stop?  No No No No Yes   Oxygen Saturation 100 % 98 % 99 % 95 % 91 % 97 %   Supplemental Oxygen 4 L/M 4 L/M 4 L/M Room Air Room Air Room Air   Heart Rate 76 bpm 87 bpm 85 bpm 75 bpm 91 bpm 93 bpm   Blood Pressure 109/72 119/79 123/81 127/78 134/94 129/67   Tanvi Dyspnea Rating  nothing at all nothing at all very light light nothing at all very light   Oxygen Saturation 84 % 89 % 87 % 93 % 93 % 97 %   Supplemental Oxygen 4 L/M 4 L/M 4 L/M 4 L/M Room Air 3 L/M   Heart Rate 98 bpm 111 bpm 128 bpm 102 bpm 128 bpm 106 bpm   Blood Pressure 119/81 117/67 135/86 130/75 139/83 124/68   Tanvi Dyspnea Rating  heavy moderate heavy heavy moderate heavy   Recovery Time (seconds) 78 seconds 70 seconds 92 seconds 240 seconds 240 seconds    Oxygen Saturation 95 % 97 % 98 % 97 % 98 % 98 %   Supplemental Oxygen Room Air 4 L/M 4 L/M Room Air Room Air Room Air   Heart Rate 87 bpm 97 bpm 92 bpm 83 bpm 102 bpm 93 bpm       Imaging:  Results for orders placed during the hospital encounter of 01/09/24    X-Ray Chest PA And Lateral    Narrative  EXAMINATION:  XR CHEST PA AND LATERAL    CLINICAL HISTORY:  Other forms of dyspnea    TECHNIQUE:  Chest 2 views    COMPARISON:  10/16/2023    FINDINGS:  Stable cardiomediastinal contours.  Unremarkable pulmonary vasculature.  Hypoinflation of the lungs.  Interstitial prominence in the pulmonary periphery intervally increased at the right mid to  lower lung zone since the prior exam associated with linear opacities.  No acute osseous change.    Impression  Peripheral interstitial prominence appears to represent a chronic finding and suggests a component of chronic interstitial lung disease.  There has been interval increased focal peripheral opacity at the right mid to lower lung zone associated with linear opacities which may indicate progressed focal fibrotic change/scarring, atelectasis, or pneumonitis.      Electronically signed by: Hermilo Odell MD  Date:    01/09/2024  Time:    09:24          Cardiodiagnostics:  Results for orders placed during the hospital encounter of 11/14/23    2D echo with color flow doppler and bubble contrast    Narrative  Transthoracic Echocardiogram Report    Patient Name  : DENISHA CRUZ  [address] [address]    Phone: [phone]    Interpreting Physician: UMANG MCKEON MD  Demographics:  Name:  DENISHA CRUZ   YOB: 1955  Age/Sex:  68, Male   Height: 5 ft 7 in  Weight:   160 pounds    BSA: 1.86 cc/m?  BMI:      25.1   Date of Study: 11/14/2023  Medical Record No:   [id number]   Location: OP  Referring Physician:   MARTHA CONNER   Technologist: Christy Kelly  Study:   Trans Thoracic Echocardiogram  Study Quality:   Good  Procedure  Type: Adult TTE (Date Study Ordered : 11/14/2023)  Components: 2D,Color Flow Doppler,Doppler,M-mode,TDI(Tissue Doppler  Imaging),Bubble  Referral diagnosis  ANN  Hemodynamics  Heart rate: 80 beats/min  Blood pressure:  112/77 mmHg  ECG:    Final Impressions  1. The study quality is good.  2. Global left ventricular systolic function is normal. The left  ventricular ejection fraction is 55-60%.  3. A bubble study was performed to rule out patent foramen ovale. The  bubble study was negative, ruling out patent foramen ovale.  4. Mild calcification of the mitral valve is noted. Mild (1+) mitral  regurgitation.  5. Trace aortic  regurgitation. Trace tricuspid regurgitation. Trace  pulmonic regurgitation.  6. The pulmonary artery systolic pressure is 29 mmHg.    Intra-modality comparison (Echocardiogram)  This echocardiogram when compared with the latest echocardiogram-TTE  ([facility]) dated 12/6/2022 shows:  1. Ejection fraction essentially remained unchanged (55% previous  study, 68% current study).  2. Mitral valve area by pressure halftime has decreased from 4.9 cm?  to 3.7 cm?.    Findings    Left Atrium  The left atrium is normal in size. Left atrial diameter is 3.4 cms.    Right Atrium  The right atrium is normal in size. Right atrial diameter is 3.3 cms.    Left Ventricle  The left ventricle is normal in size. Left ventricular diastolic  dimension is 4.3 cms. Left ventricular systolic dimension is 2.7 cms.  Left ventricular diastolic septal thickness is 1 cm. Left ventricular  diastolic postero basal free wall thickness is 1.2 cms. Global left  ventricular systolic function is normal. The left ventricular  fractional shortening is 37.2%. The left ventricular ejection fraction  is 68%. Left ventricular diastolic function is abnormal (stage I  impaired relaxation). Left ventricular outflow tract diameter is 2.1  cms. Left ventricular outflow tract VTI is 16.8 cms. Left ventricular  outflow tract mean velocity is 0.6 m/s. Left ventricular mean gradient  is 2 mmHg.    Right Ventricle  Right ventricular diastolic dimension is 3.8 cms. Right ventricular  systolic pressure is 29 mmHg.    Atrial Septum  A bubble study was performed to rule out patent foramen ovale. A four  chamber image was obtained and a bolus of agitated saline was injected  into an antecubital vein. Opacification of the right heart chambers  was visualized. No bubbles were visualized in the left heart chambers  at rest or with valsalva. The bubble study was negative, ruling out  patent foramen ovale.    Ventricular Septum  The ventricular septum is normal.    Pulmonary  Artery  The pulmonary artery systolic pressure is 29 mmHg.    Pulmonary Vein  The pulmonary vein appears normal.    IVC  The inferior vena cava is normal.    Pulmonic Valve  Evidence of pulmonic regurgitation is noted. Trace pulmonic  regurgitation. The peak velocity is 0.8 m/s. The mean velocity is 0.6  m/s. The peak trans pulmonic gradient is 2 mmHg. The mean trans  pulmonic gradient is 2 mmHg.    Tricuspid Valve  Evidence of tricuspid regurgitation is present. Trace tricuspid  regurgitation. The peak tricuspid regurgitant velocity is 2.2 m/s. The  peak trans tricuspid gradient is 19 mmHg.    Mitral Valve  Mild calcification of the mitral valve is noted. Mitral regurgitation  is noted. Mild (1+) mitral regurgitation. The pressure half time is 60  ms. The deceleration time is 243 ms. The E velocity is 0.3 m/s. The A  velocity is 0.7 m/s.    Aortic Valve  The aortic valve is tricuspid. Noted evidence of aortic regurgitation.  Trace aortic regurgitation. The trans-aortic peak velocity is 1.6 m/s.  The trans-aortic peak gradient is 10.9 mmHg. The trans-aortic mean  velocity is 1.1 m/s. The trans-aortic mean gradient is 6 mmHg. Aortic  valve VTI measures 29.3 cms. Mild calcification of the aortic valve is  noted with adequate cuspal excursion.  LVOT Diameter is 2.1 cms.    Aorta  Aortic root diameter is 3.3 cms. Ascending aorta diameter is 3.6 cms.  The aortic root appears normal.    Pericardium  The pericardium is normal in appearance.    Measurements  Left Atrium  Diameter   3.4cm(s)  Volume   28ml  Volume Index   15.1ml/m?    Right Atrium  Diameter   3.3cm(s)    Left Ventricle  End Diastolic Dimension   4.3cm(s)  End Systolic Dimension   2.7cm(s)  Posterior Basal Free Wall Thickness   1.2cm(s)  End Diastolic Septal Thickness   1cm(s)  Ejection Fraction   68%  Fractional Shortening   37.2    Right Ventricle  Diastolic Diameter   3.8cm(s)    Pulmonic Valve  Peak Pulmonic Velocity   0.8m/s  Mean Pulmonic Velocity    0.6m/s  Peak Trans Pulmonic Gradient   2mmHg  Mean Trans Pulmonic Gradient   2mmHg    Tricuspid Valve  Peak Tricuspid Regurgitant Velocity   2.2m/s  Peak Tricuspid Regurgitant Gradient   19mmHg  Pulmonary Artery Systolic Pressure  29mmHg    Mitral Valve  Pressure Half Time   60ms  Deceleration Time   243ms  A Velocity   0.7m/s  E Velocity   0.3m/s  Area By Pressure Half Time   3.7cm?    Aortic Valve  Trans Aortic Peak Velocity   1.6m/s  Trans Aortic Mean Velocity   1.1m/s  Trans Aortic Peak Gradient   10.9mmHg  Trans Aortic Mean Gradient   6mmHg  Aortic Valve VTI   29.3cm(s)  LVOT Diameter   2.1cm(s)        Electronically Authenticated by  UMANG MCKEON MD  11/14/2023 , 14:58    No results found for this or any previous visit (from the past 2160 hour(s)).   chest  Assessment:  1. ILD (interstitial lung disease)    2. Chronic respiratory failure with hypoxia    3. Systemic sclerosis with lung involvement    4. Pulmonary hypertension        Plan:      CPFE, with history of scleroderma. Mixed pattern on PFTs. CT chest with diffuse mosaicism, bibasilar predominant fibrosis, traction bronchiectasis and some peripheral honeycombing. OFEV and cellcept( has chronic GI complains mostly from scleroderma and radiation proctitis rather than therpay) . Continue current therapy.     Follows with rheumatology for  scleroderma with +CREST. Continue MMF 1500 mg BID.    Discussed the importance of compliance with oxygen supplementation. Needs >4L with exertion.    Encourage compliance with trelegy     Meets indications for lung transplant workup. Recommend referral to OSH given history of scleroderma with esophageal dysmotility. He is aware his infection history may become a barrier to transplant workup.     RTC in 3 months or sooner if needed. Repeat PFTs and 6MWT at each visit.       Ximena Gonzalez PA-C  [facility]

## 2024-06-06 NOTE — TELEPHONE ENCOUNTER
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     Patient states that he stopped the Tadalafil for one week and then restarted it on 5/29 and has not had any changes to his vision since restarting. Patient has not yet followed up with an Ophthalmologist but would like to continue on the medication "since I haven't had any issues anymore".     Discussed this with Laney Titus NP. Plan to keep patient on Tadalafil 20 mg daily. Do not increase to 40 mg. If patient experiences recurrent vision changes patient is to stop Tadalafil immediately without any plan to restart.     Patient verbalized understanding of this plan and able to repeat instructions back.   "

## 2024-06-06 NOTE — LETTER
June 6, 2024        Camelia Simon  [address]  [facility] [address]  Phone: [phone]  Fax: [phone]             Scott Margot - Transplant [facility]  [address]  [facility] [address]  Phone: [phone]   Patient: Catrachito Kraft   MR Number: [id number]   YOB: 1955   Date of Visit: 6/6/2024       Dear Dr. Camelia Simon    Thank you for referring Catrachito Kraft to me for evaluation. Attached you will find relevant portions of my assessment and plan of care.    If you have questions, please do not hesitate to call me. I look forward to following Catrachito Kraft along with you.    Sincerely,    MARINO Weaverosure    If you would like to receive this communication electronically, please contact [email or web address] or [phone] to request [employer] Link access.    [employer] Link is a tool which provides read-only access to select patient information with whom you have a relationship. Its easy to use and provides real time access to review your patients record including encounter summaries, notes, results, and demographic information.    If you feel you have received this communication in error or would no longer like to receive these types of communications, please e-mail [email or web address]

## 2024-06-11 ENCOUNTER — TELEPHONE (OUTPATIENT)
Dept: TRANSPLANT | Facility: CLINIC | Age: 69
End: 2024-06-11
Payer: COMMERCIAL

## 2024-06-11 ENCOUNTER — PATIENT MESSAGE (OUTPATIENT)
Dept: TRANSPLANT | Facility: CLINIC | Age: 69
End: 2024-06-11
Payer: COMMERCIAL

## 2024-06-11 NOTE — TELEPHONE ENCOUNTER
Patient Name: Catrachito Kraft   Patient : 1955  Patient Address: [address] [place] [address].[place] [address]  Patient phone number: [phone]    Medication/Treatment ordered: Tyvaso-PAH  (SOC 24)     Current dose/pump rate: 3 breathes QID will increase tomorrow to 4 breaths      Titration orders:  Start with 3 breaths 4 times a day (if 3 breaths are not tolerated, use 1 to 2 breaths).  Increase by additional 1 to 3 breaths per week, if tolerated, until the target dose of 9 - 12 breaths 4 times a day.     Side effects/complaints: Denies any side effects.     Physical Assessment: Patient is A&O x 4.  Patient uses supplemental oxygen of 2l/m prn.  No edema noted.  Patient ambulates without assistive devices.  Patient states appetite is decreased, and has a hard time eating due to colon issues. He eats small meals and that aids in stomach issues..  Nutritional coaching provided.  Patient reports SOB walking room to room and fatigue. Since starting Tyvaso, Catrachito says he is able to walk more with less SOB.     Patient teaching complete.  Patient able to verbalize possible side effect, dosing, and titration schedule to CNSS.       Vitals:  B/P 101/67, HR 84    Also, issues with Tyvaso Nebulizer will not stop beeping. We tried plugging it in, checking for all green lights on cord and back of machine. Shutting machine off. Turning it to program and run. Adding water. nothing is making the beeping stop. Patient is calling pharmacy to request another one. His second machine is working fine.     Weight: 150 lbs (SOC Wgt. 150 ), Height: 5'7     Plans for next RN visit and by whom: Next CNSS tele-conference visit  24    LISSETT Mendez, RN ?Nurse Clinical Educator, [facility]  c [phone]

## 2024-06-20 ENCOUNTER — PATIENT MESSAGE (OUTPATIENT)
Dept: TRANSPLANT | Facility: CLINIC | Age: 69
End: 2024-06-20
Payer: COMMERCIAL

## 2024-06-24 ENCOUNTER — TELEPHONE (OUTPATIENT)
Dept: TRANSPLANT | Facility: CLINIC | Age: 69
End: 2024-06-24
Payer: COMMERCIAL

## 2024-06-24 DIAGNOSIS — J84.9 ILD (INTERSTITIAL LUNG DISEASE): Primary | ICD-10-CM

## 2024-06-24 DIAGNOSIS — J96.11 CHRONIC RESPIRATORY FAILURE WITH HYPOXIA: ICD-10-CM

## 2024-06-24 NOTE — TELEPHONE ENCOUNTER
Krissy with Mr. [facility] @ [facility] office will call him to schedule

## 2024-06-24 NOTE — PROGRESS NOTES
Per patient request, referral to [facility] palliative care sent. Patient seen / per Dr. Gonzales.

## 2024-06-25 ENCOUNTER — TELEPHONE (OUTPATIENT)
Dept: TRANSPLANT | Facility: CLINIC | Age: 69
End: 2024-06-25
Payer: COMMERCIAL

## 2024-06-25 NOTE — TELEPHONE ENCOUNTER
Per the patient's request, lung transplant referrals were sent this morning to [facility] and [facility], with receipt confirmation noted for both faxes. Updated the patient via portal message.

## 2024-06-26 ENCOUNTER — PATIENT MESSAGE (OUTPATIENT)
Dept: TRANSPLANT | Facility: CLINIC | Age: 69
End: 2024-06-26
Payer: COMMERCIAL

## 2024-07-02 PROBLEM — Z71.89 COUNSELING REGARDING ADVANCE CARE PLANNING AND GOALS OF CARE: Status: ACTIVE | Noted: 2024-07-02

## 2024-07-02 PROBLEM — R06.00 DYSPNEA: Status: ACTIVE | Noted: 2024-07-02

## 2024-07-02 PROBLEM — F41.9 ANXIETY: Status: ACTIVE | Noted: 2024-07-02

## 2024-07-10 DIAGNOSIS — I27.9 CHRONIC PULMONARY HEART DISEASE: ICD-10-CM

## 2024-07-10 DIAGNOSIS — I27.20 PULMONARY HYPERTENSION: Primary | ICD-10-CM

## 2024-07-10 DIAGNOSIS — Z79.899 POLYPHARMACY: ICD-10-CM

## 2024-07-10 DIAGNOSIS — R06.02 SHORTNESS OF BREATH: ICD-10-CM

## 2024-07-11 ENCOUNTER — TELEPHONE (OUTPATIENT)
Dept: TRANSPLANT | Facility: CLINIC | Age: 69
End: 2024-07-11
Payer: COMMERCIAL

## 2024-07-11 ENCOUNTER — PATIENT MESSAGE (OUTPATIENT)
Dept: TRANSPLANT | Facility: CLINIC | Age: 69
End: 2024-07-11
Payer: COMMERCIAL

## 2024-07-11 NOTE — TELEPHONE ENCOUNTER
----- Message from Adenike Luna RN sent at 7/10/2024 10:59 AM CDT -----  Regarding: PH appt request  Good Morning,     Please schedule patient for PH clinic visit with labs, walk, and ECHO in September.     Thanks,  Adenike

## 2024-07-15 ENCOUNTER — PATIENT MESSAGE (OUTPATIENT)
Dept: TRANSPLANT | Facility: CLINIC | Age: 69
End: 2024-07-15
Payer: COMMERCIAL

## 2024-07-19 ENCOUNTER — TELEPHONE (OUTPATIENT)
Dept: TRANSPLANT | Facility: CLINIC | Age: 69
End: 2024-07-19
Payer: COMMERCIAL

## 2024-07-19 DIAGNOSIS — R91.8 PULMONARY NODULES: Primary | ICD-10-CM

## 2024-07-19 NOTE — TELEPHONE ENCOUNTER
Returned call to SIS Rhodes at [facility] but their transplant call system was non-operational at the time. The phone message asked that I try again at a later time.    1202: Called [facility] again and was able to speak to Meagan Morrison NP, who reported the patient was seen by providers at the [facility] lung transplant program and they want to do further evaluation of lung nodules, noted on current imaging, with a PET scan. SIS Morrison asked if the PET scan could be done closer to the patient's home in [place], [place] to prevent travel back to [place] just to get this test done. To facilitate local testing, a [place] provider would need to place and schedule the order. Asked SIS Morrison to fax us the order for the PET scan and I will ask the patient's pulmonary provider, Ximena Gonzalez PA-C, if she would be willing to order the test. Provided our office fax number. SIS Morrison stated she will send the order today.     7/23/24: Called [facility] to speak with Meagan Morrison NP, as we have not received the faxed order for the PET scan. Left a message with the  asking for a return call from the provider.    Received the order request from Meagan Morrison NP and reviewed it with Ximena Gonzalez PA-C, who agreed to order the PET scan so patient can have it done locally, in [place].   Communicated with patient via portal messages. He asked for the PET scan to be scheduled in the [place] area, where he lives. Contacted [facility] and spoke with Marlee in central scheduling. The order for the PET scan was updated to match the [facility] scheduling system and then scheduled on 7/31/24 at 1045.  Sent patient a portal message to update him. Also called patient but received no answer. Left a voice message asking him to check the portal message I sent him. Patient replied via the portal and accepted the PET scan appointment on 7/31/24.      ----- Message from Becki Wong sent at  7/19/2024  9:33 AM CDT -----  Regarding: Consult/Advisory  Contact: [phone]  CONSULT/ADVISORY    Name of Caller: SIS Rhodes /Brad [facility]    Contact Preference:  [phone]    Nature of Call:  Requesting to see if pt can have CT/PET Scan done in [place] vs [place]. States if an order is needed, they can fax it over.  States pt called on yesterday.

## 2024-07-23 ENCOUNTER — PATIENT MESSAGE (OUTPATIENT)
Dept: TRANSPLANT | Facility: CLINIC | Age: 69
End: 2024-07-23
Payer: COMMERCIAL

## 2024-07-23 DIAGNOSIS — C61 PROSTATE CANCER: ICD-10-CM

## 2024-07-23 DIAGNOSIS — R91.1 PULMONARY NODULE: Primary | ICD-10-CM

## 2024-07-23 DIAGNOSIS — N31.9 NEUROGENIC BLADDER: Primary | ICD-10-CM

## 2024-07-30 ENCOUNTER — PATIENT MESSAGE (OUTPATIENT)
Dept: TRANSPLANT | Facility: CLINIC | Age: 69
End: 2024-07-30
Payer: COMMERCIAL

## 2024-07-30 ENCOUNTER — TELEPHONE (OUTPATIENT)
Dept: TRANSPLANT | Facility: CLINIC | Age: 69
End: 2024-07-30
Payer: COMMERCIAL

## 2024-07-30 NOTE — TELEPHONE ENCOUNTER
1506: Returned call to patient's wife and she stated I had satisfactorily addressed her questions in my response to the portal message she had sent. Emphasized to her that I will update her once I receive a return call from the [facility] . Mrs. Kraft verbalized her understanding.     1526: Received a return call from Terra, the patient's transplant case manager at [facility]. Terra reported that the PET scan, requested by the [facility] transplant team and ordered by Dr. Gonzales so it could be done closer to the patient's home in [place], was denied approval by the [facility] medical director. Since the PET scan is being requested by the [facility] lung transplant team, coverage for the imaging falls under the patient's transplant benefits and must be done at the actual transplant facility.    1529: [facility]. Left a message to update her about the denied coverage for the PET scan to be done in [place]. Provided my telephone number and asked her to call me back with any questions.    1539: Called Mrs. Kraft and explained that the PET scan will have to be done at [facility] per [facility]'s transplant insurance contract. Also informed her that I left a message for Mr. Kraft's coordinator Meagan Morrison to update her about the situation. Mrs. Kraft verbalized her understanding and denied having any additional needs or questions at this time.     1542: Updated Ximena Gonzalez PA-C about the situation.           ----- Message from Vanessa Messer sent at 7/30/2024  2:24 PM CDT -----  Regarding: Consult/Advisory  Contact: :Julianne wife     Consult/Advisory     Name Of Caller:Julianne wife        Contact Preference:[phone]     Nature of call:Patients wife is calling to speak to Ximena about CT Scan. Requesting a call back

## 2024-07-30 NOTE — TELEPHONE ENCOUNTER
Discussed with wife and Mariajose Nuñez.     ----- Message from Ximena Ness sent at 7/30/2024  1:41 PM CDT -----  Regarding: Questions        Name Of Caller:   Julianne (Pt's Spouse)      Contact Preference:   [phone]      Nature of call:   Pt's Wife states [employer] informed her they didn't receive the prior authorizations for her husbands PET scan and injection. She has some questions.

## 2024-07-31 ENCOUNTER — TELEPHONE (OUTPATIENT)
Dept: TRANSPLANT | Facility: CLINIC | Age: 69
End: 2024-07-31
Payer: COMMERCIAL

## 2024-07-31 NOTE — TELEPHONE ENCOUNTER
1430: Notified by Dr. Gonzales that she completed a [id number] discussion with a [employer] provider and the PET scan was approved by that provider.  1440: Received a call from Terra, the [employer] transplant case manager, who reported that her [employer] provider approved the PET scan to be done at [facility]. The authorization number for the test is [id number].   Sent an Epic message to Yessi Usand with the [facility] pre-services department to update her about the prior authorization approval.  Called patient's wife and updated her about the approval for her 's PET scan. Explained that the test can be done at [facility] as scheduled on 8/6/24. Informed her that Dr. Gonzales wants the PET scan done, regardless of the patient's transplant status with [place] [facility] [facility], to further evaluate his pulmonary nodule. Mrs. Kraft verbalized her understanding and all questions at this time were answered to her satisfaction.

## 2024-08-06 ENCOUNTER — PATIENT MESSAGE (OUTPATIENT)
Dept: TRANSPLANT | Facility: CLINIC | Age: 69
End: 2024-08-06
Payer: COMMERCIAL

## 2024-08-07 ENCOUNTER — TELEPHONE (OUTPATIENT)
Dept: TRANSPLANT | Facility: CLINIC | Age: 69
End: 2024-08-07
Payer: COMMERCIAL

## 2024-08-07 ENCOUNTER — PATIENT MESSAGE (OUTPATIENT)
Dept: TRANSPLANT | Facility: CLINIC | Age: 69
End: 2024-08-07
Payer: COMMERCIAL

## 2024-08-09 ENCOUNTER — TELEPHONE (OUTPATIENT)
Dept: TRANSPLANT | Facility: CLINIC | Age: 69
End: 2024-08-09
Payer: COMMERCIAL

## 2024-08-12 PROBLEM — R63.4 WEIGHT LOSS, UNINTENTIONAL: Status: ACTIVE | Noted: 2024-08-12

## 2024-08-12 PROBLEM — J96.20 ACUTE ON CHRONIC RESPIRATORY FAILURE: Status: ACTIVE | Noted: 2024-08-12

## 2024-08-12 PROBLEM — I21.4 NSTEMI (NON-ST ELEVATED MYOCARDIAL INFARCTION): Status: ACTIVE | Noted: 2024-08-12

## 2024-08-13 ENCOUNTER — PATIENT MESSAGE (OUTPATIENT)
Dept: TRANSPLANT | Facility: CLINIC | Age: 69
End: 2024-08-13
Payer: COMMERCIAL

## 2024-08-14 ENCOUNTER — PATIENT MESSAGE (OUTPATIENT)
Dept: TRANSPLANT | Facility: CLINIC | Age: 69
End: 2024-08-14
Payer: COMMERCIAL

## 2024-08-15 RX ORDER — TADALAFIL 20 MG/1
20 TABLET ORAL DAILY
Qty: 30 TABLET | Refills: 11 | Status: SHIPPED | OUTPATIENT
Start: 2024-08-15

## 2024-08-19 ENCOUNTER — TELEPHONE (OUTPATIENT)
Dept: TRANSPLANT | Facility: CLINIC | Age: 69
End: 2024-08-19
Payer: COMMERCIAL

## 2024-08-20 ENCOUNTER — TELEPHONE (OUTPATIENT)
Dept: TRANSPLANT | Facility: CLINIC | Age: 69
End: 2024-08-20
Payer: COMMERCIAL

## 2024-08-22 ENCOUNTER — PATIENT MESSAGE (OUTPATIENT)
Dept: SURGERY | Facility: CLINIC | Age: 69
End: 2024-08-22
Payer: COMMERCIAL

## 2024-08-27 ENCOUNTER — TELEPHONE (OUTPATIENT)
Dept: TRANSPLANT | Facility: CLINIC | Age: 69
End: 2024-08-27
Payer: COMMERCIAL

## 2024-08-27 NOTE — TELEPHONE ENCOUNTER
"Per documentation found in Care Everywhere, patient has been declined for lung transplant evaluation at [facility] due to being "HIGH RISK FOR OPERATIVE AND POSTOPERATIVE COMPLICATIONS GIVEN CURRENT CO-MORBIDITIES." Per the documentation of the lung transplant coordinator at [facility], the patient has been informed about this decision. Ximena Gonzalez PA-C and Dr. Gonzales were notified about the status of the referral.  "

## 2024-08-28 ENCOUNTER — PATIENT MESSAGE (OUTPATIENT)
Dept: SURGERY | Facility: CLINIC | Age: 69
End: 2024-08-28
Payer: COMMERCIAL

## 2024-08-30 ENCOUNTER — PATIENT MESSAGE (OUTPATIENT)
Dept: TRANSPLANT | Facility: CLINIC | Age: 69
End: 2024-08-30
Payer: COMMERCIAL

## 2024-09-03 ENCOUNTER — PATIENT MESSAGE (OUTPATIENT)
Dept: TRANSPLANT | Facility: CLINIC | Age: 69
End: 2024-09-03
Payer: COMMERCIAL

## 2024-09-04 ENCOUNTER — PATIENT MESSAGE (OUTPATIENT)
Dept: TRANSPLANT | Facility: CLINIC | Age: 69
End: 2024-09-04
Payer: COMMERCIAL

## 2024-09-13 ENCOUNTER — TELEPHONE (OUTPATIENT)
Dept: TRANSPLANT | Facility: CLINIC | Age: 69
End: 2024-09-13
Payer: COMMERCIAL

## 2024-09-13 NOTE — TELEPHONE ENCOUNTER
BACILIO for  Swapnil - reminding about appts on 09/16    Last Appt:  9/13/2022  Next Appt:  3/21/2023  Med verified in Epic

## 2024-09-16 ENCOUNTER — HOSPITAL ENCOUNTER (OUTPATIENT)
Dept: PULMONOLOGY | Facility: CLINIC | Age: 69
Discharge: HOME | End: 2024-09-16
Payer: COMMERCIAL

## 2024-09-16 ENCOUNTER — OFFICE VISIT (OUTPATIENT)
Dept: TRANSPLANT | Facility: CLINIC | Age: 69
End: 2024-09-16
Payer: COMMERCIAL

## 2024-09-16 ENCOUNTER — LAB VISIT (OUTPATIENT)
Dept: LAB | Facility: HOSPITAL | Age: 69
End: 2024-09-16
Payer: COMMERCIAL

## 2024-09-16 ENCOUNTER — OFFICE VISIT (OUTPATIENT)
Dept: SURGERY | Facility: CLINIC | Age: 69
End: 2024-09-16
Payer: COMMERCIAL

## 2024-09-16 VITALS
WEIGHT: 142 LBS | TEMPERATURE: 97 F | RESPIRATION RATE: 24 BRPM | HEART RATE: 76 BPM | SYSTOLIC BLOOD PRESSURE: 110 MMHG | BODY MASS INDEX: 22.82 KG/M2 | HEIGHT: 66 IN | DIASTOLIC BLOOD PRESSURE: 71 MMHG

## 2024-09-16 VITALS — HEIGHT: 66 IN | WEIGHT: 142 LBS | BODY MASS INDEX: 22.82 KG/M2

## 2024-09-16 VITALS
OXYGEN SATURATION: 94 % | HEART RATE: 75 BPM | DIASTOLIC BLOOD PRESSURE: 70 MMHG | SYSTOLIC BLOOD PRESSURE: 116 MMHG | WEIGHT: 141.31 LBS | HEIGHT: 66 IN | BODY MASS INDEX: 22.71 KG/M2

## 2024-09-16 VITALS
SYSTOLIC BLOOD PRESSURE: 82 MMHG | BODY MASS INDEX: 22.89 KG/M2 | DIASTOLIC BLOOD PRESSURE: 54 MMHG | WEIGHT: 142.44 LBS | RESPIRATION RATE: 20 BRPM | HEART RATE: 90 BPM | HEIGHT: 66 IN

## 2024-09-16 DIAGNOSIS — J84.10 COMBINED PULMONARY FIBROSIS AND EMPHYSEMA (CPFE): ICD-10-CM

## 2024-09-16 DIAGNOSIS — J43.9 COMBINED PULMONARY FIBROSIS AND EMPHYSEMA (CPFE): ICD-10-CM

## 2024-09-16 DIAGNOSIS — K62.7 CHRONIC RADIATION PROCTITIS: Primary | ICD-10-CM

## 2024-09-16 DIAGNOSIS — I27.20 PULMONARY HYPERTENSION: ICD-10-CM

## 2024-09-16 DIAGNOSIS — M34.81 SYSTEMIC SCLEROSIS WITH LUNG INVOLVEMENT: ICD-10-CM

## 2024-09-16 DIAGNOSIS — I27.20 PULMONARY HYPERTENSION: Primary | ICD-10-CM

## 2024-09-16 DIAGNOSIS — I27.9 CHRONIC PULMONARY HEART DISEASE: ICD-10-CM

## 2024-09-16 DIAGNOSIS — K52.9 COLITIS: ICD-10-CM

## 2024-09-16 DIAGNOSIS — J96.21 ACUTE ON CHRONIC RESPIRATORY FAILURE WITH HYPOXIA: ICD-10-CM

## 2024-09-16 DIAGNOSIS — J84.9 ILD (INTERSTITIAL LUNG DISEASE): ICD-10-CM

## 2024-09-16 DIAGNOSIS — R06.02 SHORTNESS OF BREATH: ICD-10-CM

## 2024-09-16 DIAGNOSIS — J84.9 ILD (INTERSTITIAL LUNG DISEASE): Primary | ICD-10-CM

## 2024-09-16 DIAGNOSIS — Z79.899 POLYPHARMACY: ICD-10-CM

## 2024-09-16 DIAGNOSIS — K62.7 RADIATION PROCTITIS: ICD-10-CM

## 2024-09-16 DIAGNOSIS — J96.11 CHRONIC RESPIRATORY FAILURE WITH HYPOXIA: ICD-10-CM

## 2024-09-16 LAB
ALBUMIN SERPL BCP-MCNC: 3.5 G/DL (ref 3.5–5.2)
ALBUMIN SERPL BCP-MCNC: 3.5 G/DL (ref 3.5–5.2)
ALP SERPL-CCNC: 162 U/L (ref 55–135)
ALP SERPL-CCNC: 162 U/L (ref 55–135)
ALT SERPL W/O P-5'-P-CCNC: 13 U/L (ref 10–44)
ALT SERPL W/O P-5'-P-CCNC: 13 U/L (ref 10–44)
ANION GAP SERPL CALC-SCNC: 11 MMOL/L (ref 8–16)
ANION GAP SERPL CALC-SCNC: 11 MMOL/L (ref 8–16)
AST SERPL-CCNC: 17 U/L (ref 10–40)
AST SERPL-CCNC: 17 U/L (ref 10–40)
BASOPHILS # BLD AUTO: 0.03 K/UL (ref 0–0.2)
BASOPHILS # BLD AUTO: 0.03 K/UL (ref 0–0.2)
BASOPHILS NFR BLD: 0.4 % (ref 0–1.9)
BASOPHILS NFR BLD: 0.4 % (ref 0–1.9)
BILIRUB SERPL-MCNC: 0.7 MG/DL (ref 0.1–1)
BILIRUB SERPL-MCNC: 0.7 MG/DL (ref 0.1–1)
BNP SERPL-MCNC: 619 PG/ML (ref 0–99)
BUN SERPL-MCNC: 24 MG/DL (ref 8–23)
BUN SERPL-MCNC: 24 MG/DL (ref 8–23)
CALCIUM SERPL-MCNC: 9.7 MG/DL (ref 8.7–10.5)
CALCIUM SERPL-MCNC: 9.7 MG/DL (ref 8.7–10.5)
CHLORIDE SERPL-SCNC: 108 MMOL/L (ref 95–110)
CHLORIDE SERPL-SCNC: 108 MMOL/L (ref 95–110)
CO2 SERPL-SCNC: 20 MMOL/L (ref 23–29)
CO2 SERPL-SCNC: 20 MMOL/L (ref 23–29)
CREAT SERPL-MCNC: 1.1 MG/DL (ref 0.5–1.4)
CREAT SERPL-MCNC: 1.1 MG/DL (ref 0.5–1.4)
DIFFERENTIAL METHOD BLD: ABNORMAL
DIFFERENTIAL METHOD BLD: ABNORMAL
DLCO ADJ PRE: 5.14 ML/(MIN*MMHG) (ref 17.28–31.13)
DLCO SINGLE BREATH LLN: 17.28
DLCO SINGLE BREATH PRE REF: 20.4 %
DLCO SINGLE BREATH REF: 24.21
DLCOC SBVA LLN: 2.53
DLCOC SBVA PRE REF: 30.6 %
DLCOC SBVA REF: 3.83
DLCOC SINGLE BREATH LLN: 17.28
DLCOC SINGLE BREATH PRE REF: 21.2 %
DLCOC SINGLE BREATH REF: 24.21
DLCOCSBVAULN: 5.14
DLCOCSINGLEBREATHULN: 31.13
DLCOCSINGLEBREATHZSCORE: -4.53
DLCOSINGLEBREATHULN: 31.13
DLCOSINGLEBREATHZSCORE: -4.58
DLCOVA LLN: 2.53
DLCOVA PRE REF: 29.5 %
DLCOVA PRE: 1.13 ML/(MIN*MMHG*L) (ref 2.53–5.14)
DLCOVA REF: 3.83
DLCOVAULN: 5.14
DLVAADJ PRE: 1.17 ML/(MIN*MMHG*L) (ref 2.53–5.14)
EOSINOPHIL # BLD AUTO: 0.1 K/UL (ref 0–0.5)
EOSINOPHIL # BLD AUTO: 0.1 K/UL (ref 0–0.5)
EOSINOPHIL NFR BLD: 0.9 % (ref 0–8)
EOSINOPHIL NFR BLD: 0.9 % (ref 0–8)
ERVN2 LLN: -16449.02
ERVN2 PRE REF: 40.1 %
ERVN2 PRE: 0.39 L (ref -16449.02–16450.98)
ERVN2 REF: 0.98
ERVN2ULN: ABNORMAL
ERYTHROCYTE [DISTWIDTH] IN BLOOD BY AUTOMATED COUNT: 15.5 % (ref 11.5–14.5)
ERYTHROCYTE [DISTWIDTH] IN BLOOD BY AUTOMATED COUNT: 15.5 % (ref 11.5–14.5)
EST. GFR  (NO RACE VARIABLE): >60 ML/MIN/1.73 M^2
EST. GFR  (NO RACE VARIABLE): >60 ML/MIN/1.73 M^2
FEF 25 75 LLN: 1.32
FEF 25 75 PRE REF: 186 %
FEF 25 75 REF: 3.03
FEV05 LLN: 1.17
FEV05 REF: 2.3
FEV1 FVC LLN: 64
FEV1 FVC PRE REF: 113.4 %
FEV1 FVC REF: 77
FEV1 LLN: 2.1
FEV1 PRE REF: 117.9 %
FEV1 REF: 2.88
FEV1FVCZSCORE: 1.44
FEV1ZSCORE: 1.15
FRCN2 LLN: 2.46
FRCN2 PRE REF: 60.8 %
FRCN2 REF: 3.44
FRCN2ULN: 4.43
FVC LLN: 2.8
FVC PRE REF: 103.9 %
FVC REF: 3.75
FVCZSCORE: 0.25
GLUCOSE SERPL-MCNC: 73 MG/DL (ref 70–110)
GLUCOSE SERPL-MCNC: 73 MG/DL (ref 70–110)
HCT VFR BLD AUTO: 45.2 % (ref 40–54)
HCT VFR BLD AUTO: 45.2 % (ref 40–54)
HGB BLD-MCNC: 13.8 G/DL (ref 14–18)
HGB BLD-MCNC: 13.8 G/DL (ref 14–18)
ICN2REF: 2.69
IMM GRANULOCYTES # BLD AUTO: 0.07 K/UL (ref 0–0.04)
IMM GRANULOCYTES # BLD AUTO: 0.07 K/UL (ref 0–0.04)
IMM GRANULOCYTES NFR BLD AUTO: 0.9 % (ref 0–0.5)
IMM GRANULOCYTES NFR BLD AUTO: 0.9 % (ref 0–0.5)
IVC PRE: 3.46 L (ref 2.8–4.72)
IVC SINGLE BREATH LLN: 2.8
IVC SINGLE BREATH PRE REF: 92.3 %
IVC SINGLE BREATH REF: 3.75
IVCSINGLEBREATHULN: 4.72
LLN IC N2: -9999997.31
LYMPHOCYTES # BLD AUTO: 0.7 K/UL (ref 1–4.8)
LYMPHOCYTES # BLD AUTO: 0.7 K/UL (ref 1–4.8)
LYMPHOCYTES NFR BLD: 8.7 % (ref 18–48)
LYMPHOCYTES NFR BLD: 8.7 % (ref 18–48)
MAGNESIUM SERPL-MCNC: 1.6 MG/DL (ref 1.6–2.6)
MCH RBC QN AUTO: 27.5 PG (ref 27–31)
MCH RBC QN AUTO: 27.5 PG (ref 27–31)
MCHC RBC AUTO-ENTMCNC: 30.5 G/DL (ref 32–36)
MCHC RBC AUTO-ENTMCNC: 30.5 G/DL (ref 32–36)
MCV RBC AUTO: 90 FL (ref 82–98)
MCV RBC AUTO: 90 FL (ref 82–98)
MONOCYTES # BLD AUTO: 0.7 K/UL (ref 0.3–1)
MONOCYTES # BLD AUTO: 0.7 K/UL (ref 0.3–1)
MONOCYTES NFR BLD: 8.9 % (ref 4–15)
MONOCYTES NFR BLD: 8.9 % (ref 4–15)
NEUTROPHILS # BLD AUTO: 6.6 K/UL (ref 1.8–7.7)
NEUTROPHILS # BLD AUTO: 6.6 K/UL (ref 1.8–7.7)
NEUTROPHILS NFR BLD: 80.2 % (ref 38–73)
NEUTROPHILS NFR BLD: 80.2 % (ref 38–73)
NRBC BLD-RTO: 0 /100 WBC
NRBC BLD-RTO: 0 /100 WBC
PEF LLN: 5.62
PEF PRE REF: 120.8 %
PEF REF: 7.68
PHYSICIAN COMMENT: ABNORMAL
PLATELET # BLD AUTO: 295 K/UL (ref 150–450)
PLATELET # BLD AUTO: 295 K/UL (ref 150–450)
PMV BLD AUTO: 11.8 FL (ref 9.2–12.9)
PMV BLD AUTO: 11.8 FL (ref 9.2–12.9)
POTASSIUM SERPL-SCNC: 4 MMOL/L (ref 3.5–5.1)
POTASSIUM SERPL-SCNC: 4 MMOL/L (ref 3.5–5.1)
PRE DLCO: 4.94 ML/(MIN*MMHG) (ref 17.28–31.13)
PRE FEF 25 75: 5.63 L/S (ref 1.32–4.74)
PRE FET 100: 4.64 SEC
PRE FEV05 REF: 129.9 %
PRE FEV1 FVC: 87.28 % (ref 63.75–88.66)
PRE FEV1: 3.4 L (ref 2.1–3.62)
PRE FEV5: 2.99 L (ref 1.17–3.44)
PRE FRC N2: 2.09 L (ref 2.46–4.43)
PRE FVC: 3.9 L (ref 2.8–4.72)
PRE IC N2: 3.5 L (ref -9999997.31–#######.####)
PRE PEF: 9.27 L/S (ref 5.62–9.74)
PRE REF IC N2: 130.3 %
PROT SERPL-MCNC: 6.9 G/DL (ref 6–8.4)
PROT SERPL-MCNC: 6.9 G/DL (ref 6–8.4)
RBC # BLD AUTO: 5.02 M/UL (ref 4.6–6.2)
RBC # BLD AUTO: 5.02 M/UL (ref 4.6–6.2)
RVN2 LLN: 1.79
RVN2 PRE REF: 69 %
RVN2 PRE: 1.7 L (ref 1.79–3.14)
RVN2 REF: 2.46
RVN2TLCN2 LLN: 31.5
RVN2TLCN2 PRE REF: 75 %
RVN2TLCN2 PRE: 30.35 % (ref 31.5–49.46)
RVN2TLCN2 REF: 40.48
RVN2TLCN2ULN: 49.46
RVN2ULN: 3.14
SODIUM SERPL-SCNC: 139 MMOL/L (ref 136–145)
SODIUM SERPL-SCNC: 139 MMOL/L (ref 136–145)
TLCN2 LLN: 5.16
TLCN2 PRE REF: 88.6 %
TLCN2 PRE: 5.6 L (ref 5.16–7.47)
TLCN2 REF: 6.31
TLCN2ULN: 7.47
TLCN2ZSCORE: -1.03
ULN IC N2: ABNORMAL
VA PRE: 4.37 L (ref 6.16–6.16)
VA SINGLE BREATH LLN: 6.16
VA SINGLE BREATH PRE REF: 70.9 %
VA SINGLE BREATH REF: 6.16
VASINGLEBREATHULN: 6.16
VCMAXN2 LLN: 2.8
VCMAXN2 PRE REF: 103.9 %
VCMAXN2 PRE: 3.9 L (ref 2.8–4.72)
VCMAXN2 REF: 3.75
VCMAXN2ULN: 4.72
WBC # BLD AUTO: 8.23 K/UL (ref 3.9–12.7)
WBC # BLD AUTO: 8.23 K/UL (ref 3.9–12.7)

## 2024-09-16 PROCEDURE — 99999 PR PBB SHADOW E&M-EST. PATIENT-LVL V: CPT | Mod: PBBFAC,TXP,, | Performed by: NURSE PRACTITIONER

## 2024-09-16 PROCEDURE — 3078F DIAST BP <80 MM HG: CPT | Mod: CPTII,S$GLB,,

## 2024-09-16 PROCEDURE — 1101F PT FALLS ASSESS-DOCD LE1/YR: CPT | Mod: CPTII,S$GLB,, | Performed by: NURSE PRACTITIONER

## 2024-09-16 PROCEDURE — 1125F AMNT PAIN NOTED PAIN PRSNT: CPT | Mod: CPTII,S$GLB,, | Performed by: NURSE PRACTITIONER

## 2024-09-16 PROCEDURE — 1157F ADVNC CARE PLAN IN RCRD: CPT | Mod: CPTII,S$GLB,,

## 2024-09-16 PROCEDURE — 85025 COMPLETE CBC W/AUTO DIFF WBC: CPT | Performed by: PHYSICIAN ASSISTANT

## 2024-09-16 PROCEDURE — 3008F BODY MASS INDEX DOCD: CPT | Mod: CPTII,S$GLB,, | Performed by: COLON & RECTAL SURGERY

## 2024-09-16 PROCEDURE — 94010 BREATHING CAPACITY TEST: CPT | Mod: 59,S$GLB,, | Performed by: INTERNAL MEDICINE

## 2024-09-16 PROCEDURE — 3074F SYST BP LT 130 MM HG: CPT | Mod: CPTII,S$GLB,, | Performed by: NURSE PRACTITIONER

## 2024-09-16 PROCEDURE — 94618 PULMONARY STRESS TESTING: CPT | Mod: S$GLB,,, | Performed by: INTERNAL MEDICINE

## 2024-09-16 PROCEDURE — 36415 COLL VENOUS BLD VENIPUNCTURE: CPT | Performed by: PHYSICIAN ASSISTANT

## 2024-09-16 PROCEDURE — 1157F ADVNC CARE PLAN IN RCRD: CPT | Mod: CPTII,S$GLB,, | Performed by: NURSE PRACTITIONER

## 2024-09-16 PROCEDURE — 94729 DIFFUSING CAPACITY: CPT | Mod: S$GLB,,, | Performed by: INTERNAL MEDICINE

## 2024-09-16 PROCEDURE — 99214 OFFICE O/P EST MOD 30 MIN: CPT | Mod: S$GLB,,,

## 2024-09-16 PROCEDURE — 3008F BODY MASS INDEX DOCD: CPT | Mod: CPTII,S$GLB,,

## 2024-09-16 PROCEDURE — 3078F DIAST BP <80 MM HG: CPT | Mod: CPTII,S$GLB,, | Performed by: NURSE PRACTITIONER

## 2024-09-16 PROCEDURE — 1101F PT FALLS ASSESS-DOCD LE1/YR: CPT | Mod: CPTII,S$GLB,, | Performed by: COLON & RECTAL SURGERY

## 2024-09-16 PROCEDURE — 99999 PR PBB SHADOW E&M-EST. PATIENT-LVL V: CPT | Mod: PBBFAC,,, | Performed by: COLON & RECTAL SURGERY

## 2024-09-16 PROCEDURE — 3078F DIAST BP <80 MM HG: CPT | Mod: CPTII,S$GLB,, | Performed by: COLON & RECTAL SURGERY

## 2024-09-16 PROCEDURE — 99999 PR PBB SHADOW E&M-EST. PATIENT-LVL III: CPT | Mod: PBBFAC,,,

## 2024-09-16 PROCEDURE — 99213 OFFICE O/P EST LOW 20 MIN: CPT | Mod: S$GLB,,, | Performed by: COLON & RECTAL SURGERY

## 2024-09-16 PROCEDURE — 1157F ADVNC CARE PLAN IN RCRD: CPT | Mod: CPTII,S$GLB,, | Performed by: COLON & RECTAL SURGERY

## 2024-09-16 PROCEDURE — 3008F BODY MASS INDEX DOCD: CPT | Mod: CPTII,S$GLB,, | Performed by: NURSE PRACTITIONER

## 2024-09-16 PROCEDURE — 80053 COMPREHEN METABOLIC PANEL: CPT | Performed by: PHYSICIAN ASSISTANT

## 2024-09-16 PROCEDURE — 3288F FALL RISK ASSESSMENT DOCD: CPT | Mod: CPTII,S$GLB,, | Performed by: COLON & RECTAL SURGERY

## 2024-09-16 PROCEDURE — 1125F AMNT PAIN NOTED PAIN PRSNT: CPT | Mod: CPTII,S$GLB,, | Performed by: COLON & RECTAL SURGERY

## 2024-09-16 PROCEDURE — 83880 ASSAY OF NATRIURETIC PEPTIDE: CPT

## 2024-09-16 PROCEDURE — 1159F MED LIST DOCD IN RCRD: CPT | Mod: CPTII,S$GLB,, | Performed by: NURSE PRACTITIONER

## 2024-09-16 PROCEDURE — 83735 ASSAY OF MAGNESIUM: CPT

## 2024-09-16 PROCEDURE — 1126F AMNT PAIN NOTED NONE PRSNT: CPT | Mod: CPTII,S$GLB,,

## 2024-09-16 PROCEDURE — 94727 GAS DIL/WSHOT DETER LNG VOL: CPT | Mod: S$GLB,,, | Performed by: INTERNAL MEDICINE

## 2024-09-16 PROCEDURE — 99214 OFFICE O/P EST MOD 30 MIN: CPT | Mod: 25,S$GLB,, | Performed by: NURSE PRACTITIONER

## 2024-09-16 PROCEDURE — 3288F FALL RISK ASSESSMENT DOCD: CPT | Mod: CPTII,S$GLB,, | Performed by: NURSE PRACTITIONER

## 2024-09-16 PROCEDURE — 1101F PT FALLS ASSESS-DOCD LE1/YR: CPT | Mod: CPTII,S$GLB,,

## 2024-09-16 PROCEDURE — 3074F SYST BP LT 130 MM HG: CPT | Mod: CPTII,S$GLB,, | Performed by: COLON & RECTAL SURGERY

## 2024-09-16 PROCEDURE — 3288F FALL RISK ASSESSMENT DOCD: CPT | Mod: CPTII,S$GLB,,

## 2024-09-16 PROCEDURE — 3074F SYST BP LT 130 MM HG: CPT | Mod: CPTII,S$GLB,,

## 2024-09-16 PROCEDURE — 1160F RVW MEDS BY RX/DR IN RCRD: CPT | Mod: CPTII,S$GLB,, | Performed by: NURSE PRACTITIONER

## 2024-09-16 RX ORDER — FUROSEMIDE 20 MG/1
20 TABLET ORAL DAILY
Qty: 30 TABLET | Refills: 11 | Status: SHIPPED | OUTPATIENT
Start: 2024-09-16 | End: 2025-09-16

## 2024-09-16 RX ORDER — FUROSEMIDE 20 MG/1
20 TABLET ORAL 2 TIMES DAILY
Qty: 60 TABLET | Refills: 11 | Status: SHIPPED | OUTPATIENT
Start: 2024-09-16 | End: 2024-09-16

## 2024-09-16 NOTE — PROGRESS NOTES
Subjective:    Patient ID:  Catrachito Kraft is a 69 y.o. male who presents for follow-up of Pulmonary Hypertension.    HPI   Mr. Kraft was referred by Dr. العراقي ([facility]) with chronic hypoxemic respiratory failure secondary to progressive ILD due to systemic sclerosis (CREST) (on mycophenolate and OFEV) on home oxygen, COPD, prostate cancer with radiation induced neurogenic bladder, frequent UTI's, GERD and HF, AAA (no rupture, monitored), former smoker quit in 2005. Hospitalized 3 weeks ago fro sepsis and UTI.  Denies liver, kidney, thyroid disease.     Initial visit: Mr. Kraft is here with his wife. He is wearing 4L O2 via NC. He reports SOB, that has progressively worsened, such that he feels it with minimal exertion. He sometimes wears O2 at night. Uses 2 pillows to sleep and has not been tested for sleep apnea. He denies fluid retention, although sometimes feels full in his stomach. He endorses chest tightness and palpitations when he is SOB. Mr. Kraft reports some low BPs with accompanying symptoms of dizziness and nausea. He was recently switched from low dose nifedipine to amlodipine for his Raynaud's, but doesn't think the pressures are different - always seem low sometimes. He is very worried about his increased oxygen dependence and that he feels SOB with minimal effort.    Mr. Kraft is here for follow-up. He is not doing well. Was denied lung transplant ([place]) due to comorbidities and high risk. Was found to have AAA 4.6cm that needs to be addressed. He is currently awaiting an appointment for that. Breathing seems to be a little worse. Used 6L O2 for walk today and desatted to 89%. Tries to be active but gets SOB with little exertion. He is taking tadalafil 20 mg, once daily. He could not tolerate 40 mg 2/2 BP. He is also taking low dose amlodipine to help with his Raynaud's. Mr. Kraft was seen in the ER for ankle swelling and SOB. Given lasix, but not discharged on anything. Mr. Kraft is  currently in pain from constipation/colitis. He has prescribed pain medications for this, but is past due for his meds. He sees GI after this visit.   Patient denies chest pain, chest pressure, syncope, pre-syncope, LE edema.    6MWT:   9/16/2024   6MW    6MWT Status not completed    Patient Reported Lightheadedness;Dyspnea    Was O2 used? Yes    Delivery Method Cannula;Pull Tank;Continuous Flow    6MW Distance walked (feet) 600 feet    Distance walked (meters) 182.88 meters    Did patient stop? Yes    Oxygen Saturation 99 %    Supplemental Oxygen 6 L/M    Heart Rate 80 bpm    Blood Pressure 96/68    Tanvi Dyspnea Rating  moderate    Oxygen Saturation 89 %    Supplemental Oxygen 6 L/M    Heart Rate 118 bpm    Blood Pressure 102/62    Tanvi Dyspnea Rating  very,very heavy    Recovery Time (seconds) 90 seconds    Oxygen Saturation 97 %    Supplemental Oxygen 6 L/M    Heart Rate 94 bpm      ECHO ([facility]): 8/12/2024  Final Impressions   1. The study quality is good.   2. Global left ventricular systolic function is normal. The left   ventricular ejection fraction is 67%.   3. Concentric left ventricular hypertrophy is present. It is mild to   moderate.    4. Severely dilated right ventricle with severely reduced function.   5. Flattened interventricular septum consistent with right ventricular   pressure overload.   6. The right atrium is severly enlarged.   7. Mild calcification of the mitral valve is noted. Mitral   regurgitation is noted. Trace mitral regurgitation.   8. Aortic valve sclerosis without significant stenosis.   9. Moderate to severe (3+) tricuspid regurgitation.   10. Mild-moderate (1-2+) pulmonic regurgitation.   11. The pulmonary artery systolic pressure is 75 mmHg.  Severe   pulmonary hypertension.   12. Left ventricular diastolic function is abnormal (stage I impaired   relaxation).    13. A trivial pericardial effusion is noted.       RHC: 4/19/2024  RA: 8/ 4/ 3 RV: 66/ 0/ 3 PA: 66/ 23/ 37 PWP: 6/ 6/ 5  .   Cardiac output was 3.65 by Love. Cardiac index is 2.03 L/min/m2.   O2 Sat: PA 63%.   Thermodilution CO/CI 3.64/2.03  Mildly reduced CO/CI on no pulmonary vasodilator therapy.  Low normal right and low left sided filling pressure.  Moderate pulmonary hypertension.  TPG 32 PVR 8.76  SVR 2126 (map 100)    Nitric oxide study, at 20 ppm, 40 ppm, 80 ppm:  20ppm: 54/19 mPAP 31  40ppm: 48/18 mPAP 28  80ppm: 45/18 mPAP 27    Patient with 10 mm Hg decrease with nitric oxide from baseline PA pressures.     ECHO: 11/14/2023  Final Impressions   1. The study quality is good.   2. Global left ventricular systolic function is normal. The left   ventricular ejection fraction is 55-60%.   3. A bubble study was performed to rule out patent foramen ovale. The   bubble study was negative, ruling out patent foramen ovale.    4. Mild calcification of the mitral valve is noted. Mild (1+) mitral   regurgitation.   5. Trace aortic regurgitation. Trace tricuspid regurgitation. Trace   pulmonic regurgitation.    6. The pulmonary artery systolic pressure is 29 mmHg.     Intra-modality comparison (Echocardiogram)   This echocardiogram when compared with the latest echocardiogram-TTE   ([facility]) dated 12/6/2022 shows:    1. Ejection fraction essentially remained unchanged (55% previous   study, 68% current study).    2. Mitral valve area by pressure halftime has decreased from 4.9 cm?   to 3.7 cm?.      STRESS ECHO:   1.Stress EKG is normal. 2.The heart rate recovery is normal. 1.This is probably a normal perfusion study. 2.This scan is suggestive of low to moderate risk for future cardiovascular events. 3.Small fixed perfusion abnormality of mild intensity in the inferior lateral region. Consider Diaphramatic attenuation4.The left ventricular cavity is noted to be normal on the stress study. The left ventricular ejection fraction was calculated to be 63% and left ventricular global function is normal. 5.The study quality is excellent.  "    V/Q Scan: not done  PFTs:       9/16/2024    11:57 AM 6/6/2024    12:52 PM 1/29/2024     1:32 PM   Pulmonary Function Tests   FVC 3.9 liters 3.91 liters 3.89 liters   FEV1 3.4 liters 3.34 liters 3.25 liters   TLC (liters) 5.6 liters 5.66 liters 5 liters   DLCO (ml/mmHg sec) 4.94 ml/mmHg sec 5.64 ml/mmHg sec 6.02 ml/mmHg sec   FVC% 103.9 103.9 103   FEV1% 117.9 115.3 111.9   FEF 25-75 5.63 5.04 4.46   FEF 25-75% 186 217.6 191.1   TLC% 88.6 89.6 79.3   RV 1.7 1.75 1.12   RV% 69 71.6 45.8   DLCO% 20.4 23.1 24.7           CTA  FINDINGS:  No CT evidence of pulmonary thromboembolism.  Stable mildly enlarged bilateral hilar and mediastinal lymph nodes.  Stable fibrosis and emphysema.  Stable 1.3 cm lobular left lower lobe nodule (axial 56).  Stable 7 mm right lower lobe nodule (axial 56).  No new suspicious nodule or mass.  No pleural or pericardial fluid.  Dilated fluid-filled esophagus consistent with scleroderma.     Impression:     1. No evidence of pulmonary thromboembolism.  2. Stable fibrosis, emphysema, and lymph node enlargement.  3. Stable bilateral lower lobe nodules.  4. Dilated fluid-filled esophagus, consistent with scleroderma.     Review of Systems   Constitutional: Positive for decreased appetite, malaise/fatigue and weight loss.   Cardiovascular:  Positive for dyspnea on exertion and leg swelling. Negative for chest pain.   Respiratory:  Positive for shortness of breath. Negative for wheezing.    Endocrine: Negative.    Hematologic/Lymphatic: Negative.    Musculoskeletal:         Pain, tingling in digits 2/2 raynaud's   Gastrointestinal:  Positive for bloating, abdominal pain, constipation and dysphagia.   Genitourinary: Negative.    Neurological: Negative.    Psychiatric/Behavioral: Negative.          Objective: /70 (BP Location: Left arm, Patient Position: Sitting, BP Method: Medium (Automatic))   Pulse 75   Ht 5' 6" (1.676 m)   Wt 64.1 kg (141 lb 5 oz)   SpO2 (!) 94%   BMI 22.81 kg/m²  "    Physical Exam  Constitutional:       Appearance: Normal appearance. He is ill-appearing.   HENT:      Head: Normocephalic.   Cardiovascular:      Rate and Rhythm: Normal rate and regular rhythm.      Pulses: Normal pulses.      Heart sounds: Normal heart sounds.   Pulmonary:      Comments: Wearing O2. Uses 6L with exertion.  Musculoskeletal:         General: Normal range of motion.      Cervical back: Normal range of motion.   Skin:     General: Skin is dry.      Capillary Refill: Capillary refill takes less than 2 seconds.   Neurological:      Mental Status: He is oriented to person, place, and time.   Psychiatric:         Mood and Affect: Mood normal.         Behavior: Behavior normal.           Lab Results   Component Value Date     (H) 09/16/2024     09/16/2024     09/16/2024    K 4.0 09/16/2024    K 4.0 09/16/2024    MG 1.6 09/16/2024     09/16/2024     09/16/2024    CO2 20 (L) 09/16/2024    CO2 20 (L) 09/16/2024    BUN 24 (H) 09/16/2024    BUN 24 (H) 09/16/2024    CREATININE 1.1 09/16/2024    CREATININE 1.1 09/16/2024    GLU 73 09/16/2024    GLU 73 09/16/2024    AST 17 09/16/2024    AST 17 09/16/2024    ALT 13 09/16/2024    ALT 13 09/16/2024    ALBUMIN 3.5 09/16/2024    ALBUMIN 3.5 09/16/2024    PROT 6.9 09/16/2024    PROT 6.9 09/16/2024    BILITOT 0.7 09/16/2024    BILITOT 0.7 09/16/2024       Magnesium   Date Value Ref Range Status   09/16/2024 1.6 1.6 - 2.6 mg/dL Final       Lab Results   Component Value Date    WBC 8.23 09/16/2024    WBC 8.23 09/16/2024    HGB 13.8 (L) 09/16/2024    HGB 13.8 (L) 09/16/2024    HCT 45.2 09/16/2024    HCT 45.2 09/16/2024    MCV 90 09/16/2024    MCV 90 09/16/2024     09/16/2024     09/16/2024       Lab Results   Component Value Date    INR 1.0 03/01/2024    INR 1.0 03/26/2019         Assessment:       1. Pulmonary hypertension    2. 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 Systemic sclerosis with lung involvement    5. Chronic pulmonary heart disease    6. Colitis         Plan:     Mr. Kraft has moderate pulmonary hypertension in the setting of scleroderma and CPFE. He was unable to tolerate Tyvaso and is currently only tolerating tadalafil 20mg, once daily. Not sure he would tolerate or benefit from additional PH meds, at this point. He is also reluctant to take more pills.    He also has an enlarging AAA. He would be very high risk for surgery, but is seeing the cardiologist in a couple of weeks. Will wait until this visit to determine his next steps and if we should pursue RHC.    Would like to add some diuretic to his regimen, but will need to be careful with his soft BP and constipation issues. Will have him take 20 mg lasix for a couple of days and then take as needed for weight gain.    Discussed oxygen use and advised him to wear with any exertion. Recommend sleeping with at least 2L O2, but will defer to his pulmonologist.    He is following with palliative care and would recommend anything that would help him be more comfortable.     Return to clinic in 6 months with labs, walk.    Ximena Titus, KAEL, BHUMI  [facility]pert[facility]

## 2024-09-16 NOTE — PROCEDURES
Catrachito Kraft is a 69 y.o.  male patient, who presents for a 6 minute walk test ordered by SIS Titus.  The diagnosis is Interstitial Lung Disease; Connective Tissue Disease; Pulmonary Hypertension.  The patient's BMI is 22.9 kg/m2.  Predicted distance (lower limit of normal) is 386.61 meters.      Test Results:    The test was not completed.  The patient stopped 2 times for a total of 173 seconds.  The total time walked was 187 seconds.  During walking, the patient reported:  Lightheadedness, Dyspnea.  The patient used a walker and supplemental oxygen during testing.  Oxygen saturation was measured with forehead pulse oximetry probe.     09/16/2024---------Distance: 182.88 meters (600 feet)     O2 Sat % Supplemental Oxygen Heart Rate Blood Pressure Tanvi Scale   Pre-exercise  (Resting) 99 % 6 L/M 80 bpm 96/68 mmHg 3   During Exercise 89 % 6 L/M 118 bpm 102/62 mmHg 9   Post-exercise  (Recovery) 97 % 6 L/M  94 bpm       Recovery Time: 90 seconds    Performing nurse/tech: [employer] CRT      PREVIOUS STUDY:   06/06/2024---------Distance: 365.76 meters (1200 feet)       O2 Sat % Supplemental Oxygen Heart Rate Blood Pressure Tanvi Scale Comments   Pre-exercise  (Resting) 100 % 4 L/M 76 bpm 109/72 mmHg 0 SpO2 by forehead pulse oximetry.   During Exercise 84 % 4 L/M 98 bpm 119/81 mmHg 5-6     Post-exercise  (Recovery) 95 % 4 L/M  87 bpm             CLINICAL INTERPRETATION:  Six minute walk distance is 182.88 meters (600 feet) with very, very heavy dyspnea.  During exercise, there was significant desaturation while breathing supplemental oxygen.  Blood pressure remained stable and Heart rate increased significantly with walking.  The patient reported non-pulmonary symptoms during exercise.  Significant exercise impairment is likely due to desaturation and subjective symptoms.  The patient did not complete the study, walking 187 seconds of the 360 second test.  Since the previous study in June 2024, exercise  capacity is significantly worse.  Based upon age and body mass index, exercise capacity is less than predicted.

## 2024-09-16 NOTE — LETTER
September 16, 2024        Camelia Simon  [address]  [address]  [place] [address]  Phone: [phone]  Fax: [phone]             [facility] - Transplant 1st Fl  [address]  [facility] [address]  Phone: [phone]   Patient: Catrachito Kraft   MR Number: [id number]   YOB: 1955   Date of Visit: 9/16/2024       Dear Dr. Camelia Simon    Thank you for referring Catrachito Kraft to me for evaluation. Attached you will find relevant portions of my assessment and plan of care.    If you have questions, please do not hesitate to call me. I look forward to following Catrachito Kraft along with you.    Sincerely,    Addy Rogers NP    Enclosure    If you would like to receive this communication electronically, please contact [email or web address] or [phone] to request [employer] Link access.    [employer] Link is a tool which provides read-only access to select patient information with whom you have a relationship. Its easy to use and provides real time access to review your patients record including encounter summaries, notes, results, and demographic information.    If you feel you have received this communication in error or would no longer like to receive these types of communications, please e-mail [email or web address]

## 2024-09-16 NOTE — PROGRESS NOTES
Colon and Rectal Surgery History and Physical    Patient name: Catrachito Kraft   YOB: 1955   MRN: [id number]    Subjective:       Patient ID: Catrachito Kraft is a 69 y.o. male.    Chief Complaint: Colitis W/c.diff    HPI  70 yo M with hx of radiation proctitis last seen by me 11/13/23.  He underwent radical prostatectomy in 2019 followed by what he says was 30 rounds of XRT.  He developed significant rectal bleeding afterwards due to radiation proctitis - managed with HBOT, which essentially resolved the bleeding. However, he then began to experiences significant tenesmus when rectum fills with stool and he has a BM. Pain is only present when this happens but is severe enough to need narcotic analgesics for relief.  +Constipation - using Linzess now with some relief, not using stool softener or other laxatives regularly. Colonoscopy in Sept did not show any evidence of rectal stricture.    I explained to the patient at that time that what he was experiencing was likely the long-term sequelae of chronic radiation proctitis with fibrosis of rectal wall and decreased compliance.  I prescribed compounded suppositories (valium/baclofen/lidocaine), advised that he take stool softeners regularly while taking narcotics, though he should really attempt to wean narcotics off if he can.  I also explained that the only real surgical option would be a diverting colostomy for symptomatic relief, which he did not want to consider at this point.      Of note, he has significant interstitial lung disease/pulmonary fibrosis, systemic sclerosis, and a AAA which is approaching criteria for endovascular intervention.    Last colonoscopy - 9/21/23 - mild proctitis, diverticulosis of sigmoid colon  +Family hx of colon cancer in uncles and grandfather    He returns today for follow-up.  He states that the suppositories have helped, requesting a refill.  He was seen in ED at [facility] 8/25/24 with complaints of LLQ pain and watery  diarrhea mixed with both red and dark blood.   CT scan showed findings suggesting of colitis involving the descending colon.  WBC 15.2K.  He was given 1 dose of rocephin IV and discharged home of cephalexin with instructions to follow-up with CRS.  He  c/o ongoing abdominal pain - worse when he eats. Watery diarrhea has resolved, he is now alternating between being constipated and experiencing diarrhea.  He is still experiencing intermittent rectal bleeding.  No F/C.  He c/o rectal pain when he has to push to have a BM.    CT A/P 8/25/24:  (Images personally reviewed.)  -Findings suggestive of descending colitis.  -Diffuse urinary bladder wall thickening, possibly secondary to under distention.  Cystitis not excluded.  -4.6 cm abdominal aortic aneurysm, unchanged.  -Bilateral renal cysts, including probable hemorrhagic/highly proteinaceous cysts of the left kidney.  A follow-up nonemergent renal ultrasound may be obtained for confirmation.    Of note, his lung disease seems to have progressed.  He is O2 dependent, very limited in what he can do physically.  He has been seen at multiple centers to be evaluated for lung transplant and has been turned down.    Review of patient's allergies indicates:  No Known Allergies      Past Medical History:   Diagnosis Date    Abdominal aortic aneurysm without rupture     Abdominal pain 2020    Asthma     Colon polyps     COPD (chronic obstructive pulmonary disease)     Elevated cholesterol     GERD (gastroesophageal reflux disease)     Hematochezia     Hiatal hernia     History of radiation therapy     Hypercholesteremia     Hypertension     PONV (postoperative nausea and vomiting)     x1    Presence of indwelling urinary catheter 11/17/2020    Proctitis, radiation     Prostate cancer 2019    radiation    Pulmonary fibrosis     Rectal bleeding     Rectal bleeding 2020    due to radiation treatment for prostate cancer,  having laser procedures to treat     Reflux esophagitis      Renal disorder     uti    Systemic sclerosis     Urinary incontinence     Vertigo     Wears glasses        Past Surgical History:   Procedure Laterality Date    APPENDECTOMY      COLONOSCOPY N/A 12/4/2017    Procedure: HIGH RISK SCREENING COLONOSCOPY;  Surgeon: Lorne Hair MD;  Location: [facility] ENDO;  Service: Endoscopy;  Laterality: N/A;    COLONOSCOPY N/A 1/24/2020    Procedure: COLONOSCOPY;  Surgeon: Lorne Hair MD;  Location: [facility] ENDO;  Service: Endoscopy;  Laterality: N/A;    COLONOSCOPY N/A 9/21/2023    Procedure: COLONOSCOPY;  Surgeon: Lorne Hair MD;  Location: [facility] ENDO;  Service: Endoscopy;  Laterality: N/A;    CYSTOSCOPY  12/14/2020    Procedure: CYSTOSCOPY;  Surgeon: Aki El MD;  Location: [facility] OR;  Service: Urology;;    CYSTOSCOPY W/ RETROGRADES Bilateral 3/11/2020    Procedure: CYSTOSCOPY WITH RETROGRADE PYELOGRAM;  Surgeon: Aki El MD;  Location: [facility] OR;  Service: Urology;  Laterality: Bilateral;    CYSTOSCOPY W/ RETROGRADES Bilateral 6/16/2022    Procedure: CYSTOSCOPY WITH RETROGRADE PYELOGRAM;  Surgeon: Aki El MD;  Location: [facility] OR;  Service: Urology;  Laterality: Bilateral;    DILATION OF URETHRA N/A 3/11/2020    Procedure: DILATION, URETHRAL STRICTURE;  Surgeon: Aki El MD;  Location: [facility] OR;  Service: Urology;  Laterality: N/A;    DILATION OF URETHRA  11/20/2020    Procedure: DILATION, URETHRA;  Surgeon: Aki El MD;  Location: [facility] OR;  Service: Urology;;    EYE SURGERY Bilateral     cataract    FLEXIBLE SIGMOIDOSCOPY N/A 2/14/2020    Procedure: SIGMOIDOSCOPY, FLEXIBLE/apc;  Surgeon: Amol Telles MD;  Location: [facility] ENDO (2ND FLR);  Service: Endoscopy;  Laterality: N/A;  out-pt blood work 2/4/20-tb    FLEXIBLE SIGMOIDOSCOPY N/A 6/26/2020    Procedure: SIGMOIDOSCOPY, FLEXIBLE/apc;  Surgeon: Amol Telles MD;  Location: [facility] ENDO (2ND FLR);  Service: Endoscopy;  Laterality: N/A;  Covid-19 test 6/24/20 at [facility]  -     FLEXIBLE SIGMOIDOSCOPY N/A 8/12/2020    Procedure: SIGMOIDOSCOPY, FLEXIBLE;  Surgeon: Amol Telles MD;  Location: [facility] ENDO (2ND FLR);  Service: Endoscopy;  Laterality: N/A;  Covid-19 test 8/9/20 at [facility]    PROSTATE BIOPSY      RADICAL RETROPUBIC PROSTATECTOMY N/A 4/8/2019    Procedure: PROSTATECTOMY-RADICAL-RETROPUBIC using Harmonic scalpel;  Surgeon: Aki El MD;  Location: [facility] OR;  Service: Urology;  Laterality: N/A;    RIGHT HEART CATHETERIZATION Right 4/19/2024    Procedure: INSERTION, CATHETER, RIGHT HEART;  Surgeon: Inga Durán MD;  Location: [facility] CATH LAB;  Service: Cardiology;  Laterality: Right;       Current Outpatient Medications   Medication Sig Dispense Refill    albuterol (PROVENTIL/VENTOLIN HFA) 90 mcg/actuation inhaler Inhale 2 puffs into the lungs every 6 (six) hours as needed for Shortness of Breath or Wheezing. 6.7 g 5    amLODIPine (NORVASC) 2.5 MG tablet Take 2.5 mg by mouth every other day.      aspirin (ECOTRIN) 81 MG EC tablet Take 81 mg by mouth once daily.      atorvastatin (LIPITOR) 40 MG tablet Take 40 mg by mouth once daily.      busPIRone (BUSPAR) 10 MG tablet Take 1 tablet (10 mg total) by mouth 3 (three) times daily. (Patient taking differently: Take 10 mg by mouth 2 (two) times daily.) 90 tablet 2    cholecalciferol, vitamin D3, 125 mcg (5,000 unit) Tab Vitamin D3 125 mcg (5,000 unit) tablet, [RxNorm: 087769]      esomeprazole (NEXIUM) 40 MG capsule Take 40 mg by mouth 2 (two) times daily before meals.      fluorouraciL (EFUDEX) 5 % cream APPLY 1 APPLICATION ON THE SKIN NIGHTLY FOR 1 - 2 WEEKS.      furosemide (LASIX) 20 MG tablet Take 1 tablet (20 mg total) by mouth once daily. 30 tablet 11    methenamine (HIPREX) 1 gram Tab Take 1 g by mouth 2 (two) times daily.      mycophenolate (CELLCEPT) 500 mg Tab Take 1,500 mg by mouth 2 (two) times daily.      nintedanib (OFEV) 100 mg Cap Take 1 tablet by mouth 2 (two) times a  day. 60 capsule 11    oxyCODONE-acetaminophen (PERCOCET) 7.5-325 mg per tablet Take 1 tablet by mouth every 4 (four) hours as needed (Dyspnea/Shortness of Breath). 180 tablet 0    SENNA 8.6 mg tablet Take 2 tablets by mouth 2 (two) times daily as needed for Constipation. 240 tablet 0    tadalafil (ADCIRCA) 20 mg Tab Take 1 tablet (20 mg total) by mouth once daily. 30 tablet 11    TRELEGY ELLIPTA 100-62.5-25 mcg DsDv INHALE 1 PUFF INTO THE LUNGS ONCE DAILY. 180 each 3    LIDOCAINE 2 %, VALIUM 5 MG, BACLOFEN 4 % SUPPOSITORY Place 1 suppository rectally 2 (two) times daily. 30 each 4    meclizine (ANTIVERT) 25 mg tablet Take 1 tablet (25 mg total) by mouth 3 (three) times daily as needed. (Patient not taking: Reported on 2024) 20 tablet 0    naloxone (NARCAN) 4 mg/actuation Spry 1 spray once. (Patient not taking: Reported on 2024)       No current facility-administered medications for this visit.     Facility-Administered Medications Ordered in Other Visits   Medication Dose Route Frequency Provider Last Rate Last Admin    0.9%  NaCl infusion   Intravenous Continuous PellLorne langley MD   Stopped at 23 0900       Family History   Problem Relation Name Age of Onset    Heart disease Mother      COPD Mother      Hyperlipidemia Mother      Kidney disease Mother      Heart disease Father      Kidney disease Father      Cancer Father          prostate    Colon polyps Father      Prostate cancer Father      Bladder Cancer Father      Colon cancer Father      Colon cancer Paternal Aunt         Social History     Socioeconomic History    Marital status:    Tobacco Use    Smoking status: Former     Current packs/day: 0.00     Average packs/day: 2.5 packs/day for 25.0 years (62.5 ttl pk-yrs)     Types: Cigarettes     Start date:      Quit date: 2005     Years since quittin.7     Passive exposure: Past    Smokeless tobacco: Never   Substance and Sexual Activity    Alcohol use: Yes     Comment:  once or twice a year    Drug use: Yes     Types: Marijuana     Comment: Marijuana - as needed - scare him when he takes it - doesn't feel the stress before he loses his breath.     Social Determinants of Health     Financial Resource Strain: Low Risk  (8/12/2024)    Overall Financial Resource Strain (CARDIA)     Difficulty of Paying Living Expenses: Not hard at all   Food Insecurity: No Food Insecurity (8/12/2024)    Hunger Vital Sign     Worried About Running Out of Food in the Last Year: Never true     Ran Out of Food in the Last Year: Never true   Transportation Needs: No Transportation Needs (8/12/2024)    TRANSPORTATION NEEDS     Transportation : No   Physical Activity: Insufficiently Active (11/13/2023)    Exercise Vital Sign     Days of Exercise per Week: 2 days     Minutes of Exercise per Session: 10 min   Stress: No Stress Concern Present (3/3/2024)    [employer] - Occupational Stress Questionnaire     Feeling of Stress : Only a little   Housing Stability: Low Risk  (8/12/2024)    Housing Stability Vital Sign     Unable to Pay for Housing in the Last Year: No     Homeless in the Last Year: No       Review of Systems   Constitutional:  Positive for activity change and fatigue. Negative for chills and fever.   HENT:  Negative for congestion and sinus pressure.    Eyes:  Negative for visual disturbance.   Respiratory:  Positive for shortness of breath. Negative for cough.    Cardiovascular:  Negative for chest pain and palpitations.   Gastrointestinal:  Positive for abdominal pain, anal bleeding, constipation, diarrhea and rectal pain. Negative for abdominal distention, blood in stool, nausea and vomiting.   Endocrine: Negative for cold intolerance and heat intolerance.   Genitourinary:  Negative for dysuria and frequency.        Urinary incontinence   Musculoskeletal:  Negative for arthralgias and back pain.   Skin:  Negative for rash.   Allergic/Immunologic: Negative for  "immunocompromised state.   Neurological:  Negative for dizziness, light-headedness and headaches.   Psychiatric/Behavioral:  Negative for confusion. The patient is not nervous/anxious.        Objective:    /77 (BP Location: Left arm, Patient Position: Sitting, BP Method: Small (Automatic))   Pulse 86   Ht 5' 7" (1.702 m)   Wt 72.6 kg (160 lb 0.9 oz)   BMI 25.07 kg/m²   Physical Exam  Constitutional:       Appearance: He is well-developed.   HENT:      Head: Normocephalic and atraumatic.   Eyes:      Conjunctiva/sclera: Conjunctivae normal.   Cardiovascular:      Heart sounds: Normal heart sounds.   Pulmonary:      Effort: Pulmonary effort is normal. No respiratory distress.   Abdominal:      General: There is no distension.      Palpations: Abdomen is soft. There is no mass.      Tenderness: There is no abdominal tenderness (mild diffuse tenderness). There is no guarding or rebound.      Hernia: A hernia is present.   Musculoskeletal:      Cervical back: Normal range of motion and neck supple.   Skin:     General: Skin is warm and dry.      Findings: No erythema.   Neurological:      Mental Status: He is alert and oriented to person, place, and time.       Laboratory Studies:  Complete Blood Count:  Lab Results   Component Value Date/Time    WBC 8.23 09/16/2024 09:25 AM    WBC 8.23 09/16/2024 09:25 AM    HGB 13.8 (L) 09/16/2024 09:25 AM    HGB 13.8 (L) 09/16/2024 09:25 AM    HCT 45.2 09/16/2024 09:25 AM    HCT 45.2 09/16/2024 09:25 AM    RBC 5.02 09/16/2024 09:25 AM    RBC 5.02 09/16/2024 09:25 AM     09/16/2024 09:25 AM     09/16/2024 09:25 AM       Basic Chemistry Panel:  Lab Results   Component Value Date/Time     09/16/2024 09:25 AM     09/16/2024 09:25 AM    K 4.0 09/16/2024 09:25 AM    K 4.0 09/16/2024 09:25 AM     09/16/2024 09:25 AM     09/16/2024 09:25 AM    CO2 20 (L) 09/16/2024 09:25 AM    CO2 20 (L) 09/16/2024 09:25 AM    BUN 24 (H) 09/16/2024 09:25 AM    " BUN 24 (H) 09/16/2024 09:25 AM    CREATININE 1.1 09/16/2024 09:25 AM    CREATININE 1.1 09/16/2024 09:25 AM    CALCIUM 9.7 09/16/2024 09:25 AM    CALCIUM 9.7 09/16/2024 09:25 AM       Lab Results   Component Value Date/Time    CRP 9.5 03/03/2024 06:40 AM           Assessment:       1. Chronic radiation proctitis    2. Colitis        -Hx of prostate CA s/p radical prostatectomy followed by XRT, now with symptomatic radiation proctitis  -Recent episode of watery diarrhea mixed with blood, CT demonstrated colitis involving descending colon.    Plan:   Renewed Rx for compounded suppositories.  Will schedule for colonoscopy to evaluate colitis - symptomatically is is somewhat better - diarrhea improving but still has abdominal pain when he eats.       Shelton Aguirre MD, FACS, FASCRS  Department of Colon & Rectal Surgery

## 2024-09-16 NOTE — PROGRESS NOTES
ADVANCED LUNG DISEASE/LUNG TRANSPLANT  FOLLOW UP VISIT                                                                                                                                            Reason for Visit:  Evaluation for CPFE    Referring Physician: Ximena Gonzalez P*    History of Present Illness: Catrachito Kraft is a 69 y.o. male who is on 4L of oxygen.  He is on no assisted ventilation.  His New York Heart Association Class is III and a Karnofsky score of 70% - Cares for self: Unable to carry on normal activity or active work. He is not diabetic.    Requires Supplemental O2: Yes.  With exercise: Nasal cannula - 4 L/min.    Massive Hemoptysis: 0 occurrences  (Enter the number of times in the last year)    Exacerbations: 0 occurrences  (Enter the number of times in the last year)    Microbiology Infections: No    Patient presents today for routine follow up. He was recently evaluated for lung transplant at [facility] and denied due to being too high of a risk/co-morbidities.  Reports continued progression of his dyspnea. Has to take frequent breaks performing his ADLs due to dyspnea. On MMF and OFEV and tolerating well. Underwent RHC and started on Adcirca/Tyvaso for his PH; follows with Ximena Titus NP.  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       PER INITIAL HPI:     Patient is a former smoker with a 62 pack year history and quit in 2005. He has a history of GERD which is well controlled on PPI and H2B therapy. He was diagnosed with prostate cancer in 2019 and subsequently underwent radical protatectomy and 38 rounds of radiation. Since then, he has numerous episodes of melena, BRBPR, urinary incontinence/retention and UTIs. He states he was maintained on macrobid therapy for an entire year due to need for indwelling catheter and colonization. He states he used to cycle to stay active prior to 2019, but has been unable to do it due to pain. He takes CBD gummies to help  with his chronic rectal/urinary pain. He will occasionally use narcotics for pain relief, but often has more bleeding with Bms due to straining. +CREST, as he notes raynauds, some skin tightening, and esophageal dysmotility associated with his scleroderma.     Patient states he was instructed to start oxygen to be used with exertion, but only just recently started using 4L. He states his dyspnea was slowly progressive after his initial diagnosis, but has worsening rapidly over the last few months. He now requires frequent breaks while performing his ADLs. He admits to a more sedentary lifestyle since receiving his diagnosis and states he is scared to become short of breath. Patient presents to the clinic today with his wife. He currently lives in [place]. He previously worked offshore, as a , and has a history of occupational exposures.       Past Medical History:   Diagnosis Date    Abdominal aortic aneurysm without rupture     Abdominal pain 2020    Asthma     Colon polyps     COPD (chronic obstructive pulmonary disease)     Elevated cholesterol     GERD (gastroesophageal reflux disease)     Hematochezia     Hiatal hernia     History of radiation therapy     Hypercholesteremia     Hypertension     PONV (postoperative nausea and vomiting)     x1    Presence of indwelling urinary catheter 11/17/2020    Proctitis, radiation     Prostate cancer 2019    radiation    Pulmonary fibrosis     Rectal bleeding     Rectal bleeding 2020    due to radiation treatment for prostate cancer,  having laser procedures to treat     Reflux esophagitis     Renal disorder     uti    Systemic sclerosis     Urinary incontinence     Vertigo     Wears glasses        Past Surgical History:   Procedure Laterality Date    APPENDECTOMY      COLONOSCOPY N/A 12/4/2017    Procedure: HIGH RISK SCREENING COLONOSCOPY;  Surgeon: Lorne Hair MD;  Location: [facility];  Service: Endoscopy;  Laterality: N/A;    COLONOSCOPY N/A  1/24/2020    Procedure: COLONOSCOPY;  Surgeon: Lorne Hair MD;  Location: [facility] ENDO;  Service: Endoscopy;  Laterality: N/A;    COLONOSCOPY N/A 9/21/2023    Procedure: COLONOSCOPY;  Surgeon: Lorne Hair MD;  Location: [facility] ENDO;  Service: Endoscopy;  Laterality: N/A;    CYSTOSCOPY  12/14/2020    Procedure: CYSTOSCOPY;  Surgeon: Aki El MD;  Location: [facility] OR;  Service: Urology;;    CYSTOSCOPY W/ RETROGRADES Bilateral 3/11/2020    Procedure: CYSTOSCOPY WITH RETROGRADE PYELOGRAM;  Surgeon: Aki El MD;  Location: [facility] OR;  Service: Urology;  Laterality: Bilateral;    CYSTOSCOPY W/ RETROGRADES Bilateral 6/16/2022    Procedure: CYSTOSCOPY WITH RETROGRADE PYELOGRAM;  Surgeon: Aki El MD;  Location: [facility] OR;  Service: Urology;  Laterality: Bilateral;    DILATION OF URETHRA N/A 3/11/2020    Procedure: DILATION, URETHRAL STRICTURE;  Surgeon: Aki El MD;  Location: [facility] OR;  Service: Urology;  Laterality: N/A;    DILATION OF URETHRA  11/20/2020    Procedure: DILATION, URETHRA;  Surgeon: Aki El MD;  Location: [facility] OR;  Service: Urology;;    EYE SURGERY Bilateral     cataract    FLEXIBLE SIGMOIDOSCOPY N/A 2/14/2020    Procedure: SIGMOIDOSCOPY, FLEXIBLE/apc;  Surgeon: Amol Telles MD;  [facility]  Service: Endoscopy;  Laterality: N/A;  out-pt blood work 2/4/20-tb    FLEXIBLE SIGMOIDOSCOPY N/A 6/26/2020    Procedure: SIGMOIDOSCOPY, FLEXIBLE/apc;  Surgeon: Amol Telles MD;  Location: [facility] (2ND FLR);  Service: Endoscopy;  Laterality: N/A;  Covid-19 test 6/24/20 at [facility] - pg    FLEXIBLE SIGMOIDOSCOPY N/A 8/12/2020    Procedure: SIGMOIDOSCOPY, FLEXIBLE;  Surgeon: Amol Telles MD;  Location: [facility] (2ND FLR);  Service: Endoscopy;  Laterality: N/A;  Covid-19 test 8/9/20 at [facility]  - pg    PROSTATE BIOPSY      RADICAL RETROPUBIC PROSTATECTOMY N/A 4/8/2019    Procedure: PROSTATECTOMY-RADICAL-RETROPUBIC  using Harmonic scalpel;  Surgeon: Aki El MD;  Location: [facility] OR;  Service: Urology;  Laterality: N/A;    RIGHT HEART CATHETERIZATION Right 4/19/2024    Procedure: INSERTION, CATHETER, RIGHT HEART;  Surgeon: Inga Durán MD;  Location: [facility] CATH LAB;  Service: Cardiology;  Laterality: Right;       Allergies: Patient has no known allergies.    Current Outpatient Medications   Medication Sig    albuterol (PROVENTIL/VENTOLIN HFA) 90 mcg/actuation inhaler Inhale 2 puffs into the lungs every 6 (six) hours as needed for Shortness of Breath or Wheezing.    amLODIPine (NORVASC) 2.5 MG tablet Take 2.5 mg by mouth every other day.    aspirin (ECOTRIN) 81 MG EC tablet Take 81 mg by mouth once daily.    atorvastatin (LIPITOR) 40 MG tablet Take 40 mg by mouth once daily.    busPIRone (BUSPAR) 10 MG tablet Take 1 tablet (10 mg total) by mouth 3 (three) times daily. (Patient taking differently: Take 10 mg by mouth 2 (two) times daily.)    cholecalciferol, vitamin D3, 125 mcg (5,000 unit) Tab Vitamin D3 125 mcg (5,000 unit) tablet, [RxNorm: 007053]    esomeprazole (NEXIUM) 40 MG capsule Take 40 mg by mouth 2 (two) times daily before meals.    fluorouraciL (EFUDEX) 5 % cream APPLY 1 APPLICATION ON THE SKIN NIGHTLY FOR 1 - 2 WEEKS.    methenamine (HIPREX) 1 gram Tab Take 1 g by mouth 2 (two) times daily.    mycophenolate (CELLCEPT) 500 mg Tab Take 1,500 mg by mouth 2 (two) times daily.    nintedanib (OFEV) 100 mg Cap Take 1 tablet by mouth 2 (two) times a day.    oxyCODONE-acetaminophen (PERCOCET) 7.5-325 mg per tablet Take 1 tablet by mouth every 4 (four) hours as needed (Dyspnea/Shortness of Breath).    SENNA 8.6 mg tablet Take 2 tablets by mouth 2 (two) times daily as needed for Constipation.    tadalafil (ADCIRCA) 20 mg Tab Take 1 tablet (20 mg total) by mouth once daily.    TRELEGY ELLIPTA 100-62.5-25 mcg DsDv INHALE 1 PUFF INTO THE LUNGS ONCE DAILY.    furosemide (LASIX) 20 MG tablet Take 1 tablet (20 mg total) by  mouth once daily. (Patient not taking: Reported on 2024)    meclizine (ANTIVERT) 25 mg tablet Take 1 tablet (25 mg total) by mouth 3 (three) times daily as needed. (Patient not taking: Reported on 2024)    naloxone (NARCAN) 4 mg/actuation Spry 1 spray once. (Patient not taking: Reported on 2024)     No current facility-administered medications for this visit.     Facility-Administered Medications Ordered in Other Visits   Medication    0.9%  NaCl infusion       Immunization History   Administered Date(s) Administered    COVID-19, MRNA, LN-S, PF (Pfizer) (Purple Cap) 2021, 2021    Influenza - Quadrivalent - High Dose - PF (65 years and older) 10/27/2022, 10/14/2023    RSVpreF (Arexvy) 10/14/2023     Family History:    Family History   Problem Relation Name Age of Onset    Heart disease Mother      COPD Mother      Hyperlipidemia Mother      Kidney disease Mother      Heart disease Father      Kidney disease Father      Cancer Father          prostate    Colon polyps Father      Prostate cancer Father      Bladder Cancer Father       Social History     Substance and Sexual Activity   Alcohol Use Yes    Comment: once or twice a year      Social History     Substance and Sexual Activity   Drug Use Yes    Types: Marijuana    Comment: Marijuana - as needed - scare him when he takes it - doesn't feel the stress before he loses his breath.      Social History     Socioeconomic History    Marital status:    Tobacco Use    Smoking status: Former     Current packs/day: 0.00     Average packs/day: 2.5 packs/day for 25.0 years (62.5 ttl pk-yrs)     Types: Cigarettes     Start date:      Quit date: 2005     Years since quittin.7     Passive exposure: Past    Smokeless tobacco: Never   Substance and Sexual Activity    Alcohol use: Yes     Comment: once or twice a year    Drug use: Yes     Types: Marijuana     Comment: Marijuana - as needed - scare him when he takes it - doesn't feel the  stress before he loses his breath.     Social Determinants of Health     Financial Resource Strain: Low Risk  (8/12/2024)    Overall Financial Resource Strain (CARDIA)     Difficulty of Paying Living Expenses: Not hard at all   Food Insecurity: No Food Insecurity (8/12/2024)    Hunger Vital Sign     Worried About Running Out of Food in the Last Year: Never true     Ran Out of Food in the Last Year: Never true   Transportation Needs: No Transportation Needs (8/12/2024)    TRANSPORTATION NEEDS     Transportation : No   Physical Activity: Insufficiently Active (11/13/2023)    Exercise Vital Sign     Days of Exercise per Week: 2 days     Minutes of Exercise per Session: 10 min   Stress: No Stress Concern Present (3/3/2024)    [employer] - Occupational Stress Questionnaire     Feeling of Stress : Only a little   Housing Stability: Low Risk  (8/12/2024)    Housing Stability Vital Sign     Unable to Pay for Housing in the Last Year: No     Homeless in the Last Year: No     Review of Systems   Constitutional:  Negative for chills, diaphoresis, fever, malaise/fatigue and weight loss.   HENT:  Negative for congestion, ear discharge, ear pain, hearing loss, nosebleeds, sinus pain, sore throat and tinnitus.    Eyes:  Negative for blurred vision, double vision, photophobia, pain, discharge and redness.   Respiratory:  Positive for shortness of breath. Negative for cough, hemoptysis, sputum production, wheezing and stridor.    Cardiovascular:  Negative for chest pain, palpitations, orthopnea, claudication, leg swelling and PND.   Gastrointestinal:  Negative for abdominal pain, blood in stool, constipation, diarrhea, heartburn, melena, nausea and vomiting.        Radiation proctitis pain   Genitourinary:  Negative for dysuria, flank pain, frequency, hematuria and urgency.   Musculoskeletal:  Negative for back pain, falls, joint pain, myalgias and neck pain.   Skin:  Negative for itching and rash.  "  Neurological:  Negative for dizziness, tingling, tremors, sensory change, speech change, focal weakness, seizures, loss of consciousness, weakness and headaches.   Endo/Heme/Allergies:  Negative for environmental allergies and polydipsia. Does not bruise/bleed easily.   Psychiatric/Behavioral:  Negative for depression, hallucinations, memory loss, substance abuse and suicidal ideas. The patient is not nervous/anxious and does not have insomnia.      Vitals  /71 (BP Location: Right arm, Patient Position: Sitting, BP Method: Medium (Automatic))   Pulse 76   Temp 97 °F (36.1 °C)   Resp (!) 24   Ht 5' 6" (1.676 m)   Wt 64.4 kg (141 lb 15.6 oz)   BMI 22.92 kg/m²   Physical Exam  Vitals and nursing note reviewed.   Constitutional:       General: He is not in acute distress (proctitis).     Appearance: He is well-developed. He is ill-appearing. He is not diaphoretic.      Interventions: Nasal cannula in place.   HENT:      Head: Normocephalic and atraumatic.      Nose: Nose normal.      Mouth/Throat:      Pharynx: No oropharyngeal exudate.   Eyes:      General: No scleral icterus.     Conjunctiva/sclera: Conjunctivae normal.      Pupils: Pupils are equal, round, and reactive to light.   Neck:      Thyroid: No thyromegaly.      Vascular: No JVD.   Cardiovascular:      Rate and Rhythm: Normal rate and regular rhythm.      Heart sounds: Normal heart sounds. No murmur heard.     No friction rub. No gallop.   Pulmonary:      Effort: Pulmonary effort is normal. No respiratory distress.      Breath sounds: No stridor. Rales present. No wheezing.   Abdominal:      General: Bowel sounds are normal. There is no distension.      Palpations: Abdomen is soft.      Tenderness: There is no abdominal tenderness.   Genitourinary:     Comments: Proctitis pain  Musculoskeletal:         General: Normal range of motion.      Cervical back: Normal range of motion and neck supple.   Skin:     General: Skin is warm and dry.      " Findings: No erythema.   Neurological:      Mental Status: He is alert and oriented to person, place, and time.      Cranial Nerves: No cranial nerve deficit.   Psychiatric:         Judgment: Judgment normal.         Labs:  Lab Visit on 09/16/2024   Component Date Value    WBC 09/16/2024 8.23     RBC 09/16/2024 5.02     Hemoglobin 09/16/2024 13.8 (L)     Hematocrit 09/16/2024 45.2     MCV 09/16/2024 90     MCH 09/16/2024 27.5     MCHC 09/16/2024 30.5 (L)     RDW 09/16/2024 15.5 (H)     Platelets 09/16/2024 295     MPV 09/16/2024 11.8     Immature Granulocytes 09/16/2024 0.9 (H)     Gran # (ANC) 09/16/2024 6.6     Immature Grans (Abs) 09/16/2024 0.07 (H)     Lymph # 09/16/2024 0.7 (L)     Mono # 09/16/2024 0.7     Eos # 09/16/2024 0.1     Baso # 09/16/2024 0.03     nRBC 09/16/2024 0     Gran % 09/16/2024 80.2 (H)     Lymph % 09/16/2024 8.7 (L)     Mono % 09/16/2024 8.9     Eosinophil % 09/16/2024 0.9     Basophil % 09/16/2024 0.4     Differential Method 09/16/2024 Automated     Sodium 09/16/2024 139     Potassium 09/16/2024 4.0     Chloride 09/16/2024 108     CO2 09/16/2024 20 (L)     Glucose 09/16/2024 73     BUN 09/16/2024 24 (H)     Creatinine 09/16/2024 1.1     Calcium 09/16/2024 9.7     Total Protein 09/16/2024 6.9     Albumin 09/16/2024 3.5     Total Bilirubin 09/16/2024 0.7     Alkaline Phosphatase 09/16/2024 162 (H)     AST 09/16/2024 17     ALT 09/16/2024 13     eGFR 09/16/2024 >60.0     Anion Gap 09/16/2024 11     Sodium 09/16/2024 139     Potassium 09/16/2024 4.0     Chloride 09/16/2024 108     CO2 09/16/2024 20 (L)     Glucose 09/16/2024 73     BUN 09/16/2024 24 (H)     Creatinine 09/16/2024 1.1     Calcium 09/16/2024 9.7     Total Protein 09/16/2024 6.9     Albumin 09/16/2024 3.5     Total Bilirubin 09/16/2024 0.7     Alkaline Phosphatase 09/16/2024 162 (H)     AST 09/16/2024 17     ALT 09/16/2024 13     eGFR 09/16/2024 >60.0     Anion Gap 09/16/2024 11     WBC 09/16/2024 8.23     RBC 09/16/2024 5.02      Hemoglobin 09/16/2024 13.8 (L)     Hematocrit 09/16/2024 45.2     MCV 09/16/2024 90     MCH 09/16/2024 27.5     MCHC 09/16/2024 30.5 (L)     RDW 09/16/2024 15.5 (H)     Platelets 09/16/2024 295     MPV 09/16/2024 11.8     Immature Granulocytes 09/16/2024 0.9 (H)     Gran # (ANC) 09/16/2024 6.6     Immature Grans (Abs) 09/16/2024 0.07 (H)     Lymph # 09/16/2024 0.7 (L)     Mono # 09/16/2024 0.7     Eos # 09/16/2024 0.1     Baso # 09/16/2024 0.03     nRBC 09/16/2024 0     Gran % 09/16/2024 80.2 (H)     Lymph % 09/16/2024 8.7 (L)     Mono % 09/16/2024 8.9     Eosinophil % 09/16/2024 0.9     Basophil % 09/16/2024 0.4     Differential Method 09/16/2024 Automated     BNP 09/16/2024 619 (H)     Magnesium 09/16/2024 1.6    Hospital Outpatient Visit on 09/16/2024   Component Date Value    Pre FVC 09/16/2024 3.90     PRE FEV5 09/16/2024 2.99     Pre FEV1 09/16/2024 3.40     Pre FEV1 FVC 09/16/2024 87.28     Pre FEF 25 75 09/16/2024 5.63 (H)     Pre PEF 09/16/2024 9.27     Pre  09/16/2024 4.64     Pre DLCO 09/16/2024 4.94 (L)     DLCO ADJ PRE 09/16/2024 5.14 (L)     DLCOVA PRE 09/16/2024 1.13 (L)     DLVAAdj PRE 09/16/2024 1.17 (L)     VA PRE 09/16/2024 4.37 (L)     IVC PRE 09/16/2024 3.46     TLCN2 PRE 09/16/2024 5.60     VCMAXN2 PRE 09/16/2024 3.90     PRE IC N2 09/16/2024 3.50     Pre FRC N2 09/16/2024 2.09 (L)     ERVN2 PRE 09/16/2024 0.39     RVN2 PRE 09/16/2024 1.70 (L)     [id number] PRE 09/16/2024 30.35 (L)     FVC Ref 09/16/2024 3.75     FVC LLN 09/16/2024 2.80     FVC Pre Ref 09/16/2024 103.9     FEV05 REF 09/16/2024 2.30     FEV05 LLN 09/16/2024 1.17     PRE FEV05 REF 09/16/2024 129.9     FEV1 Ref 09/16/2024 2.88     FEV1 LLN 09/16/2024 2.10     FEV1 Pre Ref 09/16/2024 117.9     FEV1 FVC Ref 09/16/2024 77     FEV1 FVC LLN 09/16/2024 64     FEV1 FVC Pre Ref 09/16/2024 113.4     FEF 25 75 Ref 09/16/2024 3.03     FEF 25 75 LLN 09/16/2024 1.32     FEF 25 75 Pre Ref 09/16/2024 186.0     PEF Ref 09/16/2024 7.68      PEF LLN 09/16/2024 5.62     PEF Pre Ref 09/16/2024 120.8     TLCN2 Ref 09/16/2024 6.31     TLCN2 LLN 09/16/2024 5.16     TLC N2 ULN 09/16/2024 7.47     TLCN2 Pre Ref 09/16/2024 88.6     VCMAXN2 Ref 09/16/2024 3.75     VCMAXN2 LLN 09/16/2024 2.80     VCMAX N2 ULN 09/16/2024 4.72     VCMAXN2 Pre Ref 09/16/2024 103.9     QCB6FAE 09/16/2024 2.69     LLN IC N2 09/16/2024 -8048808.31     ULN IC N2 09/16/2024 41386269.69     PRE REF IC N2 09/16/2024 130.3     FRCN2 Ref 09/16/2024 3.44     FRCN2 LLN 09/16/2024 2.46     FRC N2 ULN 09/16/2024 4.43     FRCN2 Pre Ref 09/16/2024 60.8     ERVN2 Ref 09/16/2024 0.98     ERVN2 LLN 09/16/2024 -54074.02     ERV N2 ULN 09/16/2024 49047.98     ERVN2 Pre Ref 09/16/2024 40.1     RVN2 Ref 09/16/2024 2.46     RVN2 LLN 09/16/2024 1.79     RV N2 ULN 09/16/2024 3.14     RVN2 Pre Ref 09/16/2024 69.0     EQV9YWVT3 Ref 09/16/2024 40.48     CJW7FCDJ1 LLN 09/16/2024 31.50     RV N2 TLC N2 ULN 09/16/2024 49.46     AKS5OMLV0 Pre Ref 09/16/2024 75.0     DLCO Single Breath Ref 09/16/2024 24.21     DLCO Single Breath LLN 09/16/2024 17.28     DLCO SINGLEBREATH ULN 09/16/2024 31.13     DLCO Single Breath Pre R* 09/16/2024 20.4     DLCOc Single Breath Ref 09/16/2024 24.21     DLCOc Single Breath LLN 09/16/2024 17.28     DLCOC SINGLEBREATH ULN 09/16/2024 31.13     DLCOc Single Breath Pre * 09/16/2024 21.2     DLCOVA Ref 09/16/2024 3.83     DLCOVA LLN 09/16/2024 2.53     DLCOVA ULN 09/16/2024 5.14     DLCOVA Pre Ref 09/16/2024 29.5     DLCOc SBVA Ref 09/16/2024 3.83     DLCOc SBVA LLN 09/16/2024 2.53     DLCOCSBVA ULN 09/16/2024 5.14     DLCOc SBVA Pre Ref 09/16/2024 30.6     VA Single Breath Ref 09/16/2024 6.16     VA Single Breath LLN 09/16/2024 6.16     VA SINGLEBREATH ULN 09/16/2024 6.16     VA Single Breath Pre Ref 09/16/2024 70.9     IVC Single Breath Ref 09/16/2024 3.75     IVC Single Breath LLN 09/16/2024 2.80     IVC SINGLEBREATH ULN 09/16/2024 4.72     IVC Single Breath Pre Ref 09/16/2024 92.3      FVCZSCORE 09/16/2024 0.25     RTS3SGGJCT 09/16/2024 1.15     ITV6WGEMZCBRW 09/16/2024 1.44     LANY3KSURSG 09/16/2024 -1.03     DLCOSINGLEBREATHZSCORE 09/16/2024 -4.58     DLCOcSingleBreathZSCORE 09/16/2024 -4.53            9/16/2024    11:57 AM 6/6/2024    12:52 PM 1/29/2024     1:32 PM   Pulmonary Function Tests   FVC 3.9 liters 3.91 liters 3.89 liters   FEV1 3.4 liters 3.34 liters 3.25 liters   TLC (liters) 5.6 liters 5.66 liters 5 liters   DLCO (ml/mmHg sec) 4.94 ml/mmHg sec 5.64 ml/mmHg sec 6.02 ml/mmHg sec   FVC% 103.9 103.9 103   FEV1% 117.9 115.3 111.9   FEF 25-75 5.63 5.04 4.46   FEF 25-75% 186 217.6 191.1   TLC% 88.6 89.6 79.3   RV 1.7 1.75 1.12   RV% 69 71.6 45.8   DLCO% 20.4 23.1 24.7         9/16/2024     8:54 AM 8/5/2024     9:11 AM 6/6/2024    12:27 PM 3/18/2024    12:12 PM 1/29/2024    11:37 AM 11/8/2023     2:33 PM 12/5/2022     2:16 PM   6MW   6MWT Status not completed completed without stopping completed without stopping completed without stopping completed without stopping completed without stopping completed without stopping   Patient Reported Lightheadedness;Dyspnea Dyspnea Dyspnea;Lightheadedness Lightheadedness Dyspnea;Lightheadedness Dyspnea Calf pain;Dizziness;Dyspnea;Leg pain;Lightheadedness;Other (Comment)   Was O2 used? Yes Yes Yes Yes Yes Yes No   Delivery Method Cannula;Pull Tank;Continuous Flow Cannula;Demand Flow;Continuous Flow Cannula;Continuous Flow;Pull Tank Cannula;Pull Tank;Continuous Flow Cannula;Pull Tank;Continuous Flow Demand Flow    6MW Distance walked (feet) 600 feet 550 feet 1200 feet 1300 feet 1250 feet 1550 feet 1100 feet   Distance walked (meters) 182.88 meters 167.64 meters 365.76 meters 396.24 meters 381 meters 472.44 meters 335.28 meters   Did patient stop? Yes No  No No No No   Oxygen Saturation 99 % 97 % 100 % 98 % 99 % 95 % 91 %   Supplemental Oxygen 6 L/M Room Air 4 L/M 4 L/M 4 L/M Room Air Room Air   Heart Rate 80 bpm 80 bpm 76 bpm 87 bpm 85 bpm 75 bpm 91 bpm    Blood Pressure 96/68 106/80 109/72 119/79 123/81 127/78 134/94   Tanvi Dyspnea Rating  moderate nothing at all nothing at all nothing at all very light light nothing at all   Oxygen Saturation 89 % 94 % 84 % 89 % 87 % 93 % 93 %   Supplemental Oxygen 6 L/M 6 L/M 4 L/M 4 L/M 4 L/M 4 L/M Room Air   Heart Rate 118 bpm 111 bpm 98 bpm 111 bpm 128 bpm 102 bpm 128 bpm   Blood Pressure 102/62 125/71 119/81 117/67 135/86 130/75 139/83   Tanvi Dyspnea Rating  very,very heavy light heavy moderate heavy heavy moderate   Recovery Time (seconds) 90 seconds  78 seconds 70 seconds 92 seconds 240 seconds 240 seconds   Oxygen Saturation 97 % 99 % 95 % 97 % 98 % 97 % 98 %   Supplemental Oxygen 6 L/M Room Air Room Air 4 L/M 4 L/M Room Air Room Air   Heart Rate 94 bpm 77 bpm 87 bpm 97 bpm 92 bpm 83 bpm 102 bpm       Imaging:  Results for orders placed during the hospital encounter of 01/09/24    X-Ray Chest PA And Lateral    Narrative  EXAMINATION:  XR CHEST PA AND LATERAL    CLINICAL HISTORY:  Other forms of dyspnea    TECHNIQUE:  Chest 2 views    COMPARISON:  10/16/2023    FINDINGS:  Stable cardiomediastinal contours.  Unremarkable pulmonary vasculature.  Hypoinflation of the lungs.  Interstitial prominence in the pulmonary periphery intervally increased at the right mid to lower lung zone since the prior exam associated with linear opacities.  No acute osseous change.    Impression  Peripheral interstitial prominence appears to represent a chronic finding and suggests a component of chronic interstitial lung disease.  There has been interval increased focal peripheral opacity at the right mid to lower lung zone associated with linear opacities which may indicate progressed focal fibrotic change/scarring, atelectasis, or pneumonitis.      Electronically signed by: Hermilo Odell MD  Date:    01/09/2024  Time:    09:24          Cardiodiagnostics:  Results for orders placed during the hospital encounter of 11/14/23    2D echo with color  flow doppler and bubble contrast    Narrative  Transthoracic Echocardiogram Report    Patient Name  : DENISHA CRUZ  [facility]  [address] [place] [address]    Phone: [phone]    Interpreting Physician: UMANG MCKEON MD  Demographics:  Name:  DENISHA CRUZ   YOB: 1955  Age/Sex:  68, Male   Height: 5 ft 7 in  Weight:   160 pounds    BSA: 1.86 cc/m?  BMI:      25.1   Date of Study: 11/14/2023  Medical Record No:   [id number]   Location: OP  Referring Physician:   MARTHA CONNER   Technologist: Christy Kelly  Study:   Trans Thoracic Echocardiogram  Study Quality:   Good  Procedure  Type: Adult TTE (Date Study Ordered : 11/14/2023)  Components: 2D,Color Flow Doppler,Doppler,M-mode,TDI(Tissue Doppler  Imaging),Bubble  Referral diagnosis  ANN  Hemodynamics  Heart rate: 80 beats/min  Blood pressure:  112/77 mmHg  ECG:    Final Impressions  1. The study quality is good.  2. Global left ventricular systolic function is normal. The left  ventricular ejection fraction is 55-60%.  3. A bubble study was performed to rule out patent foramen ovale. The  bubble study was negative, ruling out patent foramen ovale.  4. Mild calcification of the mitral valve is noted. Mild (1+) mitral  regurgitation.  5. Trace aortic regurgitation. Trace tricuspid regurgitation. Trace  pulmonic regurgitation.  6. The pulmonary artery systolic pressure is 29 mmHg.    Intra-modality comparison (Echocardiogram)  This echocardiogram when compared with the latest echocardiogram-TTE  ([facility]) dated 12/6/2022 shows:  1. Ejection fraction essentially remained unchanged (55% previous  study, 68% current study).  2. Mitral valve area by pressure halftime has decreased from 4.9 cm?  to 3.7 cm?.    Findings    Left Atrium  The left atrium is normal in size. Left atrial diameter is 3.4 cms.    Right Atrium  The right atrium is normal in size. Right atrial diameter is 3.3 cms.    Left Ventricle  The left  ventricle is normal in size. Left ventricular diastolic  dimension is 4.3 cms. Left ventricular systolic dimension is 2.7 cms.  Left ventricular diastolic septal thickness is 1 cm. Left ventricular  diastolic postero basal free wall thickness is 1.2 cms. Global left  ventricular systolic function is normal. The left ventricular  fractional shortening is 37.2%. The left ventricular ejection fraction  is 68%. Left ventricular diastolic function is abnormal (stage I  impaired relaxation). Left ventricular outflow tract diameter is 2.1  cms. Left ventricular outflow tract VTI is 16.8 cms. Left ventricular  outflow tract mean velocity is 0.6 m/s. Left ventricular mean gradient  is 2 mmHg.    Right Ventricle  Right ventricular diastolic dimension is 3.8 cms. Right ventricular  systolic pressure is 29 mmHg.    Atrial Septum  A bubble study was performed to rule out patent foramen ovale. A four  chamber image was obtained and a bolus of agitated saline was injected  into an antecubital vein. Opacification of the right heart chambers  was visualized. No bubbles were visualized in the left heart chambers  at rest or with valsalva. The bubble study was negative, ruling out  patent foramen ovale.    Ventricular Septum  The ventricular septum is normal.    Pulmonary Artery  The pulmonary artery systolic pressure is 29 mmHg.    Pulmonary Vein  The pulmonary vein appears normal.    IVC  The inferior vena cava is normal.    Pulmonic Valve  Evidence of pulmonic regurgitation is noted. Trace pulmonic  regurgitation. The peak velocity is 0.8 m/s. The mean velocity is 0.6  m/s. The peak trans pulmonic gradient is 2 mmHg. The mean trans  pulmonic gradient is 2 mmHg.    Tricuspid Valve  Evidence of tricuspid regurgitation is present. Trace tricuspid  regurgitation. The peak tricuspid regurgitant velocity is 2.2 m/s. The  peak trans tricuspid gradient is 19 mmHg.    Mitral Valve  Mild calcification of the mitral valve is noted. Mitral  regurgitation  is noted. Mild (1+) mitral regurgitation. The pressure half time is 60  ms. The deceleration time is 243 ms. The E velocity is 0.3 m/s. The A  velocity is 0.7 m/s.    Aortic Valve  The aortic valve is tricuspid. Noted evidence of aortic regurgitation.  Trace aortic regurgitation. The trans-aortic peak velocity is 1.6 m/s.  The trans-aortic peak gradient is 10.9 mmHg. The trans-aortic mean  velocity is 1.1 m/s. The trans-aortic mean gradient is 6 mmHg. Aortic  valve VTI measures 29.3 cms. Mild calcification of the aortic valve is  noted with adequate cuspal excursion.  LVOT Diameter is 2.1 cms.    Aorta  Aortic root diameter is 3.3 cms. Ascending aorta diameter is 3.6 cms.  The aortic root appears normal.    Pericardium  The pericardium is normal in appearance.    Measurements  Left Atrium  Diameter   3.4cm(s)  Volume   28ml  Volume Index   15.1ml/m?    Right Atrium  Diameter   3.3cm(s)    Left Ventricle  End Diastolic Dimension   4.3cm(s)  End Systolic Dimension   2.7cm(s)  Posterior Basal Free Wall Thickness   1.2cm(s)  End Diastolic Septal Thickness   1cm(s)  Ejection Fraction   68%  Fractional Shortening   37.2    Right Ventricle  Diastolic Diameter   3.8cm(s)    Pulmonic Valve  Peak Pulmonic Velocity   0.8m/s  Mean Pulmonic Velocity   0.6m/s  Peak Trans Pulmonic Gradient   2mmHg  Mean Trans Pulmonic Gradient   2mmHg    Tricuspid Valve  Peak Tricuspid Regurgitant Velocity   2.2m/s  Peak Tricuspid Regurgitant Gradient   19mmHg  Pulmonary Artery Systolic Pressure  29mmHg    Mitral Valve  Pressure Half Time   60ms  Deceleration Time   243ms  A Velocity   0.7m/s  E Velocity   0.3m/s  Area By Pressure Half Time   3.7cm?    Aortic Valve  Trans Aortic Peak Velocity   1.6m/s  Trans Aortic Mean Velocity   1.1m/s  Trans Aortic Peak Gradient   10.9mmHg  Trans Aortic Mean Gradient   6mmHg  Aortic Valve VTI   29.3cm(s)  LVOT Diameter   2.1cm(s)        Electronically Authenticated by  UMANG MCKEON  MD  11/14/2023 , 14:58    Results for orders placed or performed during the hospital encounter of 08/25/24 (from the past 2160 hour(s))   CT Abdomen Pelvis With IV Contrast NO Oral Contrast    Narrative    EXAMINATION:  CT ABDOMEN PELVIS WITH IV CONTRAST    CLINICAL HISTORY:  LLQ abdominal pain;    CT/nuclear cardiac exams in previous 12 months: 6    TECHNIQUE:  Axial CT images were obtained and evaluated with coronal reformatted images.  Iterative reconstruction technique was used.    COMPARISON:  CT abdomen/pelvis 08/11/2024    FINDINGS:  There is fibrotic change of the visualized lungs.  No significant finding identified of the liver, pancreas, spleen or adrenal glands.  Kidneys enhance symmetrically and contain multiple cysts.  A few probable hemorrhagic/highly proteinaceous cysts are seen of the left kidney as well.  Gallbladder is unremarkable.  Distal esophagus is mildly distended with fluid.  There is colonic diverticulosis.  Circumferential wall thickening with adjacent fat stranding is seen throughout mainly the descending colon, suggesting colitis.  No evidence of an abscess or bowel obstruction.  Diffuse urinary bladder wall thickening may be secondary to under distention.  4.6 cm aneurysm of the infrarenal aorta is unchanged.      Impression    Findings suggestive of descending colitis.    Diffuse urinary bladder wall thickening, possibly secondary to under distention.  Cystitis not excluded.    4.6 cm abdominal aortic aneurysm, unchanged.    Bilateral renal cysts, including probable hemorrhagic/highly proteinaceous cysts of the left kidney.  A follow-up nonemergent renal ultrasound may be obtained for confirmation.    Additional observations as above.      Electronically signed by: Matt Odell MD  Date:    08/25/2024  Time:    15:00   Results for orders placed or performed during the hospital encounter of 08/11/24 (from the past 2160 hour(s))   CTA Chest Non-Coronary (PE Studies)    Narrative     EXAMINATION:  CTA CHEST NON CORONARY (PE STUDIES)    CLINICAL HISTORY:  Pulmonary embolism (PE) suspected, high prob;    TECHNIQUE:  Thin section post IV contrast images were obtained to evaluate pulmonary vasculature. Coronal MIP reformations were performed. Iterative reconstruction technique was used.    CT/cardiac nuclear exam/s in prior 12 months:  4.    COMPARISON:  CTA chest 03/01/2024.    FINDINGS:  No evidence of pulmonary thromboembolism.  Stable mildly enlarged mediastinal lymph nodes.  Stable 1.3 cm left lower lobe nodule (axial 68).  Stable 7 mm right lower lobe nodule (axial 67).  Increasing densities within the right upper lobe adjacent to a bulla and within the subpleural anterior left upper lobe, probably acute airspace disease.  Continued follow-up recommended.  No pleural or pericardial fluid.      Impression    1. No evidence of pulmonary thromboembolism.  2. Increasing densities within the right upper lobe adjacent or pole and within the subpleural anterior left lower lobe, probably acute airspace disease.  Continued follow-up recommended.  3. Stable bilateral lower lobe nodules.  4. Moderate fibrosis.      Electronically signed by: Amol Menard MD  Date:    08/12/2024  Time:    08:46      chest  Assessment:  1. ILD (interstitial lung disease)    2. Chronic respiratory failure with hypoxia    3. Systemic sclerosis with lung involvement    4. Pulmonary hypertension    5. Radiation proctitis/cystitis        Plan:      CPFE, with history of scleroderma. Mixed pattern on PFTs. CT chest with diffuse mosaicism, bibasilar predominant fibrosis, traction bronchiectasis and some peripheral honeycombing. OFEV and cellcept ( has chronic GI complains mostly from scleroderma and radiation proctitis rather than therpay). Continue current therapy.  Patient would like to follow up with Dr. Simon.    Follows with rheumatology for  scleroderma with +CREST. Continue MMF 1500 mg BID.    Continue supplemental  oxygen.  Needs 6L with exertion based on today's 6MWT.      Encourage trelegy     Continue follow up with PH clinic.      Has appointment with Colon Surgery today for proctitis pain.     Patient turned down for lung transplant at [facility] due to being too high of a risk from comorbidities.  Patient states that he would like to return to seeing Dr. Simon since he is not a lung transplant candidate.     No follow up needed at [facility] as patient wishes to return to seeing Dr. Simon in [place].      Addy Rogers NP  Advanced Lung Disease/Lung Transplant

## 2024-09-17 PROBLEM — K52.9 COLITIS: Status: ACTIVE | Noted: 2024-09-17

## 2024-09-17 PROBLEM — I27.20 PULMONARY HYPERTENSION: Status: ACTIVE | Noted: 2024-09-17

## 2024-09-19 ENCOUNTER — TELEPHONE (OUTPATIENT)
Dept: ENDOSCOPY | Facility: HOSPITAL | Age: 69
End: 2024-09-19
Payer: COMMERCIAL

## 2024-09-19 VITALS — BODY MASS INDEX: 22.82 KG/M2 | WEIGHT: 142 LBS | HEIGHT: 66 IN

## 2024-09-19 DIAGNOSIS — R93.3 ABNORMAL FINDING ON GI TRACT IMAGING: ICD-10-CM

## 2024-09-19 DIAGNOSIS — Z12.11 SCREEN FOR COLON CANCER: Primary | ICD-10-CM

## 2024-09-19 DIAGNOSIS — K52.9 COLITIS: Primary | ICD-10-CM

## 2024-09-19 RX ORDER — SODIUM, POTASSIUM,MAG SULFATES 17.5-3.13G
1 SOLUTION, RECONSTITUTED, ORAL ORAL DAILY
Qty: 1 KIT | Refills: 0 | Status: SHIPPED | OUTPATIENT
Start: 2024-09-19 | End: 2024-09-21

## 2024-09-19 NOTE — TELEPHONE ENCOUNTER
Spoke to [place] to schedule procedure(s) Colonoscopy       Physician to perform procedure(s) Dr. MCKENNA Kwan  Date of Procedure (s) 10/24/24  Arrival Time 8:30 AM  Time of Procedure(s) 9:30 AM   Location of Procedure(s) [address]   Type of Rx Prep sent to patient: Suprep  Instructions provided to patient via [facility]    Patient was informed on the following information and verbalized understanding. Screening questionnaire reviewed with patient and complete. If procedure requires anesthesia, a responsible adult needs to be present to accompany the patient home, patient cannot drive after receiving anesthesia. Appointment details are tentative, especially check-in time. Patient will receive a prep-op call 7 days prior to confirm check-in time for procedure. If applicable the patient should contact their pharmacy to verify Rx for procedure prep is ready for pick-up. Patient was advised to call the scheduling department at [phone] if pharmacy states no Rx is available. Patient was advised to call the endoscopy scheduling department if any questions or concerns arise.      [facility]oscopy Scheduling Department

## 2024-09-19 NOTE — TELEPHONE ENCOUNTER
"----- Message from Shagufta Hull sent at 9/17/2024  9:55 AM CDT -----    ----- Message -----  From: Shelton Aguirre MD  Sent: 9/16/2024   1:36 PM CDT  To: [facility] Endoscopist [facility] Patients    Procedure: Colonoscopy    Diagnosis: Abnormal finding on GI tract imaging - colitis    Procedure Timing: Within 4 weeks (Urgent)    #If within 4 weeks selected, please maylin as high priority#    #If greater than 12 weeks, please select "5-12 weeks" and delay sending until 3 months prior to requested date#     Location: Hospital Based ([facility] 2Endo,  endo, [facility])    Additional Scheduling Information: Severe pulmonary fibrosis, pulmonary HTN, on home O2    Prep Specifications:Standard prep    Is the patient taking a GLP-1 Agonist:no    Have you attached a patient to this message: yes  "

## 2024-09-23 ENCOUNTER — TELEPHONE (OUTPATIENT)
Dept: ENDOSCOPY | Facility: HOSPITAL | Age: 69
End: 2024-09-23
Payer: COMMERCIAL

## 2024-09-23 NOTE — TELEPHONE ENCOUNTER
Dear Dr. Camelia Simon ,    Patient has a scheduled procedure Colonoscopy on 10/24/24.  In order to ensure patient safety, is patient cleared from a Pulmonology   standpoint for this procedure?   Thank you for your prompt reply.    [facility] Endoscopy Scheduling

## 2024-09-24 ENCOUNTER — PATIENT MESSAGE (OUTPATIENT)
Dept: TRANSPLANT | Facility: CLINIC | Age: 69
End: 2024-09-24
Payer: COMMERCIAL

## 2024-09-24 NOTE — TELEPHONE ENCOUNTER
[facility]  Pulmonary  Pre-op Assessment    # Pulmonary Surgical Risk Assessment   - ARISCAT index is 27 (predicts 13.3 risk of post operative pulmonary complications, including occurrence of respiratory failure, respiratory infection, pleural effusion, atelctasis, pneumothorax, bronchospasm or aspiration pneumonitis)   - Procedure / Surgery: Colonoscopy    Preoperative Recommendations:   - Smoking cessation as early as possible; cessation for eight weeks or longer is likely of greater benefit   - Inhaled beta-agonists for patients with COPD or asthma who have wheezes or dyspnea perioperatively.   - Delay elective surgery if respiratory infection present   - Antibiotics only for patients with lower respiratory tract infection   - Preoperative inspiratory muscle training   - Preoperative education regarding lung expansion maneuvers   - Preoperative chest physical therapy     Intraoperative Recommendations:   - Choose alternative procedure lasting less than three to four hours when possible   - Surgery other than upper abdominal or thoracic when possible   - Regional anesthesia (nerve block), when this is an option, in very high-risk patients   - Avoid use of pancuronium as a muscle relaxant in high-risk patients   - Choosing laparoscopic rather than open abdominal surgery when possible may be beneficial.   - Epidural or spinal anesthesia may confer lower risk than general anesthesia, though this remains an area of debate.     Postoperative Recommendations:   - Deep breathing exercises or incentive spirometry in high risk patients   - Epidural or NSAID analgesia in place of parenteral opioids if possible        Electronically Signed by Camelia Simon MD  Pulmonology   [facility]   Phone: [phone]  Fax: [phone]

## 2024-10-17 ENCOUNTER — TELEPHONE (OUTPATIENT)
Dept: ENDOSCOPY | Facility: HOSPITAL | Age: 69
End: 2024-10-17
Payer: COMMERCIAL

## 2024-10-17 NOTE — TELEPHONE ENCOUNTER
Spoke to patient for pre-call to confirm scheduled Colonoscopy and patient verbalized understanding of the following:       Date & arrival time of procedure(s) verified 10/24/24, 8:30 AM.  Location of procedure(s) [address]  verified.  NPO status reinforced. Ok to continue clear liquids until 7:30 AM.   Patient denies use of blood thinners, GLP-1 medications, and weight loss medications.  Patient confirmed receipt of prep instructions and Rx prep.  Instructions provided to patient via [facility].  Patient confirmed ride home after procedure if procedure requires anesthesia.   Pre-call screening questionnaire reviewed and completed with patient.   Appointment details are tentative, including check-in time.  If the patient begins taking any blood thinning medications, injectable weight loss/diabetes medications (other than insulin), or Adipex (phentermine) patient was instructed to contact the endoscopy scheduling department as soon as possible.  Patient was advised to call the endoscopy scheduling department if any questions or concerns arise.     [facility]oscopy Scheduling Department

## 2024-10-18 ENCOUNTER — PATIENT MESSAGE (OUTPATIENT)
Dept: TRANSPLANT | Facility: CLINIC | Age: 69
End: 2024-10-18
Payer: COMMERCIAL

## 2024-10-19 ENCOUNTER — TELEPHONE (OUTPATIENT)
Dept: ENDOSCOPY | Facility: HOSPITAL | Age: 69
End: 2024-10-19
Payer: COMMERCIAL

## 2024-10-19 NOTE — TELEPHONE ENCOUNTER
----- Message from Shagufta sent at 10/18/2024  4:32 PM CDT -----  Regarding: FW: proc 10/24    ----- Message -----  From: Nancie Dangelo MA  Sent: 10/18/2024   2:01 PM CDT  To: [facility] Endoscopist Clinic Patients  Subject: proc 10/24                                         ----- Message -----  From: Mendy Pickard  Sent: 10/18/2024   1:57 PM CDT  To: [facility] Staff    .Type:  Patient Call Back    Who Called: PT WIFE DAVE       Does the patient know what this is regarding?: SHE CALLED TO SEE IF THE OFFICE CALLED PT PULMONARY PROVIDER TO GET CLEARANCE FOR THE UPCOMING COLONOSCOPY      Would the patient rather a call back YES     Best Call Back Number: [phone]          Additional Information: DR. DALILA THOMAS [phone]     Thank You

## 2024-10-22 ENCOUNTER — TELEPHONE (OUTPATIENT)
Dept: GASTROENTEROLOGY | Facility: CLINIC | Age: 69
End: 2024-10-22
Payer: COMMERCIAL

## 2024-10-22 NOTE — TELEPHONE ENCOUNTER
----- Message from Roxy sent at 10/22/2024  9:05 AM CDT -----  Regarding: Appt Access  Contact: pt [phone]  Patient calling to cancel colonoscopy. Pls call

## 2024-10-22 NOTE — TELEPHONE ENCOUNTER
MA returned call/spoke with patient. Let him know that I have sent a message to the schedulers in regards to cancelling his procedure and that someone will reach out to him. Patient verbalized understanding and had no other questions at this time.

## 2024-10-23 ENCOUNTER — TELEPHONE (OUTPATIENT)
Dept: ENDOSCOPY | Facility: HOSPITAL | Age: 69
End: 2024-10-23
Payer: COMMERCIAL

## 2024-10-23 ENCOUNTER — PATIENT MESSAGE (OUTPATIENT)
Dept: ENDOSCOPY | Facility: HOSPITAL | Age: 69
End: 2024-10-23
Payer: COMMERCIAL

## 2024-10-23 NOTE — TELEPHONE ENCOUNTER
----- Message from Shagufta sent at 10/23/2024  9:35 AM CDT -----  Regarding: FW: cxl upcoming proc 10/24    ----- Message -----  From: Nancie Dangelo MA  Sent: 10/22/2024   8:46 AM CDT  To: [facility] Endoscopist Clinic Patients  Subject: cxl upcoming proc 10/24                            ----- Message -----  From: Meka Jeter  Sent: 10/22/2024   8:44 AM CDT  To: [facility] Staff  Subject: call back                                        Type: Patient Call Back    Who called: pt     What is the request in detail: requesting cancellation of upcoming endoscopy, pt has contracted pneumonia and wants to reschedule once he is feeling better     Can the clinic reply by [facility]?no    Would the patient rather a call back or a response via [facility]? call    Best call back number: [phone]     Additional Information:

## 2024-10-23 NOTE — TELEPHONE ENCOUNTER
----- Message from Shagufta sent at 10/23/2024  9:35 AM CDT -----  Regarding: FW: cxl upcoming proc 10/24    ----- Message -----  From: Nancie Dangelo MA  Sent: 10/22/2024   8:46 AM CDT  To: [facility] Endoscopist Clinic Patients  Subject: cxl upcoming proc 10/24                            ----- Message -----  From: Meka Jeter  Sent: 10/22/2024   8:44 AM CDT  To: [facility] Staff  Subject: call back                                        Type: Patient Call Back    Who called: pt     What is the request in detail: requesting cancellation of upcoming endoscopy, pt has contracted pneumonia and wants to reschedule once he is feeling better     Can the clinic reply by [facility]?no    Would the patient rather a call back or a response via [facility]? call    Best call back number: [phone]     Additional Information:

## 2024-10-29 PROBLEM — J18.9 PNEUMONIA DUE TO INFECTIOUS ORGANISM: Status: ACTIVE | Noted: 2024-10-29

## 2024-10-29 PROBLEM — Z99.81 OXYGEN DEPENDENT: Status: ACTIVE | Noted: 2024-10-29

## 2024-10-30 PROBLEM — I50.810 RIGHT-SIDED HEART FAILURE: Status: ACTIVE | Noted: 2024-10-30

## 2024-11-18 ENCOUNTER — TELEPHONE (OUTPATIENT)
Dept: TRANSPLANT | Facility: CLINIC | Age: 69
End: 2024-11-18
Payer: COMMERCIAL

## 2024-11-18 ENCOUNTER — PATIENT MESSAGE (OUTPATIENT)
Dept: TRANSPLANT | Facility: CLINIC | Age: 69
End: 2024-11-18
Payer: COMMERCIAL

## 2024-11-18 NOTE — TELEPHONE ENCOUNTER
"NN clled and LVM for patient to call back. Per [facility] Davison: "He also has an enlarging AAA. He would be very high risk for surgery, but is seeing the cardiologist in a couple of weeks. Will wait until this visit to determine his next steps and if we should pursue RHC."     NN was calling to see if he has followed up   "

## 2024-11-19 ENCOUNTER — POST MORTEM DOCUMENTATION (OUTPATIENT)
Dept: TRANSPLANT | Facility: CLINIC | Age: 69
End: 2024-11-19
Payer: COMMERCIAL

## (undated) DEVICE — COVER PROBE US 5.5X58L NON LTX

## (undated) DEVICE — SHEATH INTRODUCER 7FR 11CM

## (undated) DEVICE — CATH SWAN GANZ STND 7FR

## (undated) DEVICE — TRAY CATH LAB OMC

## (undated) DEVICE — SET MICROPUNCTURE 5FR 501NT

## (undated) DEVICE — TRANSDUCER ADULT DISP